# Patient Record
Sex: FEMALE | Race: WHITE | NOT HISPANIC OR LATINO | Employment: OTHER | URBAN - METROPOLITAN AREA
[De-identification: names, ages, dates, MRNs, and addresses within clinical notes are randomized per-mention and may not be internally consistent; named-entity substitution may affect disease eponyms.]

---

## 2017-01-03 ENCOUNTER — TELEPHONE (OUTPATIENT)
Dept: FAMILY MEDICINE | Facility: CLINIC | Age: 62
End: 2017-01-03

## 2017-01-03 DIAGNOSIS — Z00.00 ANNUAL PHYSICAL EXAM: Primary | ICD-10-CM

## 2017-01-03 NOTE — TELEPHONE ENCOUNTER
----- Message from Omayra Zhou sent at 1/3/2017 11:39 AM CST -----  Contact: self  Patient called regarding scheduling lab work before her appt on 2/8/17 for f/u and med refill. No orders. Please contact [phone] ([phone])

## 2017-01-04 NOTE — TELEPHONE ENCOUNTER
Called pt, left message on machine to call back to schedule labs that have been placed upon pts request.

## 2017-01-25 ENCOUNTER — PATIENT OUTREACH (OUTPATIENT)
Dept: ADMINISTRATIVE | Facility: HOSPITAL | Age: 62
End: 2017-01-25

## 2017-01-25 NOTE — PROGRESS NOTES
PRE-VISIT CHART REVIEW    Appointment Scheduled on 2/8/17    Department stratifications & guidelines reviewed:yes    Target Chronic Diagnosis: HTN, obesity    Chronic Diagnosis Intervention Due: no    Goals Updated:No    Health Maintenance Due   Topic Date Due    TETANUS VACCINE  12/29/1972    Zoster Vaccine  12/29/2014    Influenza Vaccine  08/01/2016       Advanced Directives:   65 years of age or older?  No  Directive on file?  N\A                                      Pre-visit patient communication: 01/25/2017 MyChart/Letter    Studies or screenings scheduled pre-visit: no    Educate patient on HTN (127/80), obesity (59.07)

## 2017-01-25 NOTE — LETTER
January 31, 2017    Meka Ann  [address] [address]             [facility]  [address]  [facility] [address]  Phone: [phone] Dear Mrs. Ann:    We have tried to reach you by mychart unsuccessfully.    [facility] is committed to your overall health.  To help you get the most out of each of your visits, we will review your information to make sure you are up to date on all of your recommended tests and/or procedures.       Dr. Corry Katz has found that you may be due for flu, shingles, and tetanus.     If you have had any of the above done at another facility, please bring the records or information with you so that your record at [facility] will be complete.     If you are currently taking medication, please bring it with you to your appointment for review.     If you have any questions or concerns, please don't hesitate to call.    Thank you for letting us care for you,  Mandy Fisher LPN Clinical Care Coordinator  [facility] [place]  [phone]

## 2017-02-03 ENCOUNTER — LAB VISIT (OUTPATIENT)
Dept: LAB | Facility: HOSPITAL | Age: 62
End: 2017-02-03
Attending: FAMILY MEDICINE
Payer: COMMERCIAL

## 2017-02-03 DIAGNOSIS — Z00.00 ANNUAL PHYSICAL EXAM: ICD-10-CM

## 2017-02-03 LAB
ALBUMIN SERPL BCP-MCNC: 3.6 G/DL
ALP SERPL-CCNC: 51 U/L
ALT SERPL W/O P-5'-P-CCNC: 21 U/L
ANION GAP SERPL CALC-SCNC: 5 MMOL/L
AST SERPL-CCNC: 18 U/L
BASOPHILS # BLD AUTO: 0.03 K/UL
BASOPHILS NFR BLD: 0.5 %
BILIRUB SERPL-MCNC: 0.4 MG/DL
BUN SERPL-MCNC: 16 MG/DL
CALCIUM SERPL-MCNC: 9.3 MG/DL
CHLORIDE SERPL-SCNC: 106 MMOL/L
CHOLEST/HDLC SERPL: 3.6 {RATIO}
CO2 SERPL-SCNC: 28 MMOL/L
CREAT SERPL-MCNC: 0.9 MG/DL
DIFFERENTIAL METHOD: ABNORMAL
EOSINOPHIL # BLD AUTO: 0.2 K/UL
EOSINOPHIL NFR BLD: 2.6 %
ERYTHROCYTE [DISTWIDTH] IN BLOOD BY AUTOMATED COUNT: 13.6 %
EST. GFR  (AFRICAN AMERICAN): >60 ML/MIN/1.73 M^2
EST. GFR  (NON AFRICAN AMERICAN): >60 ML/MIN/1.73 M^2
GLUCOSE SERPL-MCNC: 103 MG/DL
HCT VFR BLD AUTO: 40.4 %
HDL/CHOLESTEROL RATIO: 28.1 %
HDLC SERPL-MCNC: 167 MG/DL
HDLC SERPL-MCNC: 47 MG/DL
HGB BLD-MCNC: 13.5 G/DL
LDLC SERPL CALC-MCNC: 102 MG/DL
LYMPHOCYTES # BLD AUTO: 2.5 K/UL
LYMPHOCYTES NFR BLD: 42.2 %
MCH RBC QN AUTO: 31.5 PG
MCHC RBC AUTO-ENTMCNC: 33.4 %
MCV RBC AUTO: 94 FL
MONOCYTES # BLD AUTO: 0.7 K/UL
MONOCYTES NFR BLD: 11.7 %
NEUTROPHILS # BLD AUTO: 2.5 K/UL
NEUTROPHILS NFR BLD: 42.8 %
NONHDLC SERPL-MCNC: 120 MG/DL
PLATELET # BLD AUTO: 284 K/UL
PMV BLD AUTO: 11.4 FL
POTASSIUM SERPL-SCNC: 4.3 MMOL/L
PROT SERPL-MCNC: 6.8 G/DL
RBC # BLD AUTO: 4.29 M/UL
SODIUM SERPL-SCNC: 139 MMOL/L
TRIGL SERPL-MCNC: 90 MG/DL
WBC # BLD AUTO: 5.83 K/UL

## 2017-02-03 PROCEDURE — 83036 HEMOGLOBIN GLYCOSYLATED A1C: CPT

## 2017-02-03 PROCEDURE — 36415 COLL VENOUS BLD VENIPUNCTURE: CPT | Mod: PO

## 2017-02-03 PROCEDURE — 85025 COMPLETE CBC W/AUTO DIFF WBC: CPT

## 2017-02-03 PROCEDURE — 80053 COMPREHEN METABOLIC PANEL: CPT

## 2017-02-03 PROCEDURE — 80061 LIPID PANEL: CPT

## 2017-02-05 ENCOUNTER — PATIENT MESSAGE (OUTPATIENT)
Dept: FAMILY MEDICINE | Facility: CLINIC | Age: 62
End: 2017-02-05

## 2017-02-05 LAB
ESTIMATED AVG GLUCOSE: 117 MG/DL
HBA1C MFR BLD HPLC: 5.7 %

## 2017-02-08 ENCOUNTER — OFFICE VISIT (OUTPATIENT)
Dept: FAMILY MEDICINE | Facility: CLINIC | Age: 62
End: 2017-02-08
Payer: COMMERCIAL

## 2017-02-08 VITALS
HEIGHT: 61 IN | HEART RATE: 70 BPM | DIASTOLIC BLOOD PRESSURE: 90 MMHG | TEMPERATURE: 99 F | SYSTOLIC BLOOD PRESSURE: 128 MMHG | RESPIRATION RATE: 16 BRPM | WEIGHT: 137.81 LBS | BODY MASS INDEX: 26.02 KG/M2

## 2017-02-08 DIAGNOSIS — I10 HYPERTENSION, ESSENTIAL: Primary | ICD-10-CM

## 2017-02-08 PROCEDURE — 3074F SYST BP LT 130 MM HG: CPT | Mod: S$GLB,,, | Performed by: FAMILY MEDICINE

## 2017-02-08 PROCEDURE — 99214 OFFICE O/P EST MOD 30 MIN: CPT | Mod: S$GLB,,, | Performed by: FAMILY MEDICINE

## 2017-02-08 PROCEDURE — 3080F DIAST BP >= 90 MM HG: CPT | Mod: S$GLB,,, | Performed by: FAMILY MEDICINE

## 2017-02-08 RX ORDER — VALSARTAN AND HYDROCHLOROTHIAZIDE 320; 25 MG/1; MG/1
1 TABLET, FILM COATED ORAL DAILY
Qty: 90 TABLET | Refills: 3 | Status: SHIPPED | OUTPATIENT
Start: 2017-02-08 | End: 2018-01-11 | Stop reason: SDUPTHER

## 2017-02-08 RX ORDER — OMEPRAZOLE 20 MG/1
20 CAPSULE, DELAYED RELEASE ORAL DAILY
Qty: 90 CAPSULE | Refills: 1 | Status: SHIPPED | OUTPATIENT
Start: 2017-02-08 | End: 2018-02-08

## 2017-02-08 RX ORDER — NORETHINDRONE ACETATE AND ETHINYL ESTRADIOL 1; 5 MG/1; UG/1
1 TABLET ORAL DAILY
Qty: 90 TABLET | Refills: 1 | Status: SHIPPED | OUTPATIENT
Start: 2017-02-08 | End: 2018-01-11 | Stop reason: SDUPTHER

## 2017-02-08 RX ORDER — VALACYCLOVIR HYDROCHLORIDE 500 MG/1
500 TABLET, FILM COATED ORAL DAILY
Qty: 90 TABLET | Refills: 3 | Status: SHIPPED | OUTPATIENT
Start: 2017-02-08 | End: 2018-02-01 | Stop reason: SDUPTHER

## 2017-02-08 NOTE — MR AVS SNAPSHOT
[facility]  [address]  [facility] [address]  Phone: [phone]  Fax: [phone]                  Meka Ann   2017 8:30 AM   Office Visit    Description:  Female : 1955   Provider:  Corry Katz MD   Department:  [facility]           Reason for Visit     Follow-up           Diagnoses this Visit        Comments    Hypertension, essential    -  Primary            To Do List           Future Appointments        Provider Department Dept Phone    2017 9:00 AM NURSE, [facility] [phone]      Goals (5 Years of Data)     None       These Medications        Disp Refills Start End    valsartan-hydrochlorothiazide (DIOVAN HCT) 320-25 mg per tablet 90 tablet 3 2017    Take 1 tablet by mouth once daily. - Oral    Pharmacy: [facility] - [address] AT [address] [facility] Ph #: [phone]       valacyclovir (VALTREX) 500 MG tablet 90 tablet 3 2017     Take 1 tablet (500 mg total) by mouth once daily. - Oral    Pharmacy: [facility] - [address] AT [address] [facility] Ph #: [phone]       norethindrone-ethinyl estradiol (FEMHRT ) 1-5 mg-mcg Tab 90 tablet 1 2017     Take 1 tablet by mouth once daily. - Oral    Pharmacy: [facility] - [address] AT [address] to [facility] Ph #: [phone]       omeprazole (PRILOSEC) 20 MG capsule 90 capsule 1 2017    Take 1 capsule (20 mg total) by mouth once daily. - Oral    [facility]: [facility] [address] [address] [address] [facility] [facility] [address] AT [facility] [address] Ph #: [phone]         [facility]     [facility] On Call Nurse Care Line -  Assistance  Registered nurses in the [facility] provide clinical advisement, health  "education, appointment booking, and other advisory services.  Call for this free service at [phone].             Medications                Verify that the below list of medications is an accurate representation of the medications you are currently taking.  If none reported, the list may be blank. If incorrect, please contact your healthcare provider. Carry this list with you in case of emergency.           Current Medications     calcium-vitamin D (OSCAL) 250 (625)-125 mg-unit per tablet Take 1 tablet by mouth once daily.    etodolac (LODINE) 500 MG tablet TAKE 1 TABLET BY MOUTH TWICE DAILY AS NEEDED    hydrocodone-acetaminophen (VICODIN) 5-300 mg Tab Take 5-300 mg by mouth 3 (three) times daily as needed.    magnesium oxide-Mg AA chelate (MAGNESIUM, AMINO ACID CHELATE,) 133 mg Tab Take 700 mg by mouth once daily.    multivitamin capsule Take 1 capsule by mouth once daily.    norethindrone-ethinyl estradiol (FEMHRT 1/5) 1-5 mg-mcg Tab Take 1 tablet by mouth once daily.    omeprazole (PRILOSEC) 20 MG capsule Take 1 capsule (20 mg total) by mouth once daily.    valacyclovir (VALTREX) 500 MG tablet Take 1 tablet (500 mg total) by mouth once daily.    valsartan-hydrochlorothiazide (DIOVAN HCT) 320-25 mg per tablet Take 1 tablet by mouth once daily.           Clinical Reference Information           Your Vitals Were     BP Pulse Temp Resp Height Weight    128/90 70 98.5 °F (36.9 °C) (Oral) 16 5' 1" (1.549 m) 62.5 kg (137 lb 12.6 oz)    BMI                26.03 kg/m2          Blood Pressure          Most Recent Value    BP  (!)  128/90      Allergies as of 2/8/2017     Septra [Sulfamethoxazole-trimethoprim]      Immunizations Administered on Date of Encounter - 2/8/2017     None      Language Assistance Services     ATTENTION: Language assistance services are available, free of charge. Please call [phone].      ATENCIÓN: Si habla narcisa, tiene a ramos disposición servicios gratuitos de asistencia lingüística. " Bobbi pelaez [phone].     MACKENZIE Ý: N?u b?n nói Ti?ng Vi?t, có các d?ch v? h? tr? ngôn ng? mi?n phí dành cho b?n. G?i s? [phone].         [facility] complies with applicable Federal civil rights laws and does not discriminate on the basis of race, color, national origin, age, disability, or sex.

## 2017-02-11 NOTE — PROGRESS NOTES
Subjective:       Patient ID: Meka Ann is a 61 y.o. female.    Chief Complaint: Follow-up    HPI   The patient is a 61-year-old who is here today for chronic follow-up.  Today we discussed the followin)  hypertension.  Her blood pressure is elevated today.  Her diastolic blood pressures have been consistently high here in the clinic.  She feels that her blood pressure is high in the clinic because of anxiety and white coat hypertension.  At her last visit, her blood pressure was high and so she came back to have it checked with the nurses and it was normal at the nurse visit.  She is currently taking Diovan /12.5 mg once a day  2)  family history of stomach cancer.  Her father and her paternal uncle both had stomach cancer.  We have discussed genetic testing but she has decided against this.  She does plan to follow up with her GI doctor every 4 years for EGD testing and she is due for her next EGD this summer.  We did talk extensively about this diagnosis and symptoms associated with this diagnosis.  She currently denies any trouble or pain with swallowing, early satiety, acid reflux, nausea, vomiting, diminished appetite or weight loss  3)  history of HSV.  She continues to take her Valtrex regularly.  We did discuss a trial without this medication but previously she had recurrent lesions when she stopped this medication so she is hesitant to do without this medication  4)  HRT.  She is doing well with her femhrt.  She occasionally will space out of her HRT.  We did discuss a trial without this which she will consider but she would like to have a refill on this for now  5)  DDD of the lumbar spine.  She continues to use her Lodine and her Vicodin very sparingly.  She still has Lodine from her last prescription in December and Vicodin from her last prescription in May      Review of Systems   Constitutional: Negative for appetite change, chills, diaphoresis, fatigue, fever and unexpected weight  change.   HENT: Negative for congestion, ear pain, postnasal drip, rhinorrhea, sinus pressure, sneezing, sore throat and trouble swallowing.    Eyes: Negative for pain, discharge and visual disturbance.   Respiratory: Negative for cough, chest tightness, shortness of breath and wheezing.    Cardiovascular: Negative for chest pain, palpitations and leg swelling.   Gastrointestinal: Negative for abdominal distention, abdominal pain, blood in stool, constipation, diarrhea, nausea and vomiting.   Skin: Negative for rash.       Objective:      Physical Exam   Constitutional: She is oriented to person, place, and time. She appears well-developed and well-nourished. No distress.   HENT:   Head: Normocephalic and atraumatic.   Right Ear: Hearing, tympanic membrane, external ear and ear canal normal.   Left Ear: Hearing, tympanic membrane, external ear and ear canal normal.   Nose: Nose normal.   Mouth/Throat: Oropharynx is clear and moist and mucous membranes are normal. No oral lesions. No oropharyngeal exudate, posterior oropharyngeal edema or posterior oropharyngeal erythema.   Eyes: Conjunctivae, EOM and lids are normal. Pupils are equal, round, and reactive to light. No scleral icterus.   Neck: Normal range of motion. Neck supple. Carotid bruit is not present. No thyroid mass and no thyromegaly present.   Cardiovascular: Normal rate, regular rhythm and normal heart sounds.   No extrasystoles are present. PMI is not displaced.  Exam reveals no gallop.    No murmur heard.  Pulmonary/Chest: Effort normal and breath sounds normal. No accessory muscle usage. No respiratory distress.   Clear to auscultation bilaterally.   Abdominal: Soft. Normal appearance and bowel sounds are normal. She exhibits no abdominal bruit. There is no hepatosplenomegaly. There is no tenderness. There is no rebound.   Lymphadenopathy:        Head (right side): No submental and no submandibular adenopathy present.        Head (left side): No  "submental and no submandibular adenopathy present.        Right cervical: No superficial cervical, no deep cervical and no posterior cervical adenopathy present.       Left cervical: No superficial cervical, no deep cervical and no posterior cervical adenopathy present.        Right: No supraclavicular adenopathy present.        Left: No supraclavicular adenopathy present.   Neurological: She is alert and oriented to person, place, and time.   Skin: Skin is warm, dry and intact.   Psychiatric: She has a normal mood and affect.     Blood pressure (!) 128/90, pulse 70, temperature 98.5 °F (36.9 °C), temperature source Oral, resp. rate 16, height 5' 1" (1.549 m), weight 62.5 kg (137 lb 12.6 oz).Body mass index is 26.03 kg/(m^2).        A/P:  1)  hypertension.  Uncertain control.  She will come back in 2 weeks to have the nurses check her blood pressure.  If her blood pressure is higher than 139/89 at that visit, we will add another blood pressure medicine to her Diovan HCT  2)  family history of stomach cancer.  Follow-up with GI for EGD testing later this year.  If she develops any concerning symptoms we discussed above, she will let me know  3)  history of HSV.  Currently asymptomatic.  The did refill her Valtrex.  I did encourage her to consider a trial without this medication  4)  HRT.  I did refill her femhrt.  She will consider trying to wean and stop this medication.  We did discuss the WHO and NAMS recommendations regarding HRT  5)  degenerative disc disease of lumbar spine.  Persistent.  She will continue to use her Lodine and Vicodin sparingly and will let me know when she needs refill of this medication  "

## 2017-02-24 ENCOUNTER — CLINICAL SUPPORT (OUTPATIENT)
Dept: FAMILY MEDICINE | Facility: CLINIC | Age: 62
End: 2017-02-24
Payer: COMMERCIAL

## 2017-02-24 ENCOUNTER — TELEPHONE (OUTPATIENT)
Dept: FAMILY MEDICINE | Facility: CLINIC | Age: 62
End: 2017-02-24

## 2017-02-24 VITALS — SYSTOLIC BLOOD PRESSURE: 124 MMHG | DIASTOLIC BLOOD PRESSURE: 74 MMHG

## 2017-02-24 DIAGNOSIS — I10 HYPERTENSION, ESSENTIAL: Primary | ICD-10-CM

## 2017-02-24 NOTE — PROGRESS NOTES
Pt here for BP check. Checked on left arm and was 124/74. Pt states she took her BP med this morning and thinks her BP was only high on last visit due to her having multiple lean cuisines that weak.

## 2017-06-20 RX ORDER — OMEPRAZOLE 20 MG/1
CAPSULE, DELAYED RELEASE ORAL
Qty: 90 CAPSULE | Refills: 2 | Status: SHIPPED | OUTPATIENT
Start: 2017-06-20 | End: 2017-08-18 | Stop reason: SDUPTHER

## 2017-08-11 ENCOUNTER — PATIENT OUTREACH (OUTPATIENT)
Dept: ADMINISTRATIVE | Facility: HOSPITAL | Age: 62
End: 2017-08-11

## 2017-08-11 NOTE — LETTER
August 15, 2017    Meka Ann  [address] [address]             [facility]  [address]  [facility] [address]  Phone: [phone] Dear Mrs. Ann:    We have tried to reach you by mychart unsuccessfully.    [facility] is committed to your overall health.  To help you get the most out of each of your visits, we will review your information to make sure you are up to date on all of your recommended tests and/or procedures.       Dr. Corry Katz has found that you may be due for osteoporosis screening and possibly tetanus and shingles immunizations.     If you have had any of the above done at another facility, please bring the records or information with you so that your record at [facility] will be complete.  If you would like to schedule any of these, please contact me.     If you are currently taking medication, please bring it with you to your appointment for review.     If you have any questions or concerns, please don't hesitate to call.    Thank you for letting us care for you,  Mandy Fisher LPN Clinical Care Coordinator  [facility] [place]  [phone]

## 2017-08-18 ENCOUNTER — TELEPHONE (OUTPATIENT)
Dept: FAMILY MEDICINE | Facility: CLINIC | Age: 62
End: 2017-08-18

## 2017-08-18 ENCOUNTER — OFFICE VISIT (OUTPATIENT)
Dept: FAMILY MEDICINE | Facility: CLINIC | Age: 62
End: 2017-08-18
Payer: COMMERCIAL

## 2017-08-18 VITALS
HEART RATE: 72 BPM | WEIGHT: 139.13 LBS | RESPIRATION RATE: 18 BRPM | TEMPERATURE: 98 F | HEIGHT: 61 IN | DIASTOLIC BLOOD PRESSURE: 80 MMHG | SYSTOLIC BLOOD PRESSURE: 128 MMHG | BODY MASS INDEX: 26.27 KG/M2

## 2017-08-18 DIAGNOSIS — Z12.31 OTHER SCREENING MAMMOGRAM: ICD-10-CM

## 2017-08-18 DIAGNOSIS — M85.80 OSTEOPENIA, UNSPECIFIED LOCATION: ICD-10-CM

## 2017-08-18 DIAGNOSIS — M79.89 LEG SWELLING: Primary | ICD-10-CM

## 2017-08-18 PROCEDURE — 3074F SYST BP LT 130 MM HG: CPT | Mod: S$GLB,,, | Performed by: FAMILY MEDICINE

## 2017-08-18 PROCEDURE — 99214 OFFICE O/P EST MOD 30 MIN: CPT | Mod: S$GLB,,, | Performed by: FAMILY MEDICINE

## 2017-08-18 PROCEDURE — 3008F BODY MASS INDEX DOCD: CPT | Mod: S$GLB,,, | Performed by: FAMILY MEDICINE

## 2017-08-18 PROCEDURE — 3079F DIAST BP 80-89 MM HG: CPT | Mod: S$GLB,,, | Performed by: FAMILY MEDICINE

## 2017-08-18 RX ORDER — HYDROCODONE BITARTRATE AND ACETAMINOPHEN 5; 300 MG/1; MG/1
1 TABLET ORAL 2 TIMES DAILY PRN
Qty: 30 EACH | Refills: 0 | Status: SHIPPED | OUTPATIENT
Start: 2017-08-18 | End: 2018-08-15 | Stop reason: SDUPTHER

## 2017-08-24 NOTE — PROGRESS NOTES
Subjective:       Patient ID: Meka Ann is a 61 y.o. female.    Chief Complaint: Leg Swelling and Rash    HPI   The patient is a 61-year-old who is here today concerned about leg swelling and a rash she developed recently while on a cruise.  She developed swelling in both of her lower leg accompanied with a red rash while she was on the cruise.  The swelling and rash seemed to develop the day after she had done a lot of walking.  She tried to prop her feet up for the swelling and use lotion for the rash and eventually the swelling and the rash sided.  She's not had problems with this before the cruise or since the cruise.    While she is here, she also wonders if she could do her chronic follow-up.  Overall, she has been doing well and had no issues.  She does request refill of her Vicodin as she only has a few pills left from last year's prescription.  She does occasionally have pain from her neck and back and Vicodin relieves the pain and allows her to function with her daily activities.  She denies any side effects with the Vicodin      Review of Systems   Constitutional: Negative for activity change, appetite change, chills, diaphoresis, fatigue, fever and unexpected weight change.   HENT: Negative for congestion, ear pain, hearing loss, postnasal drip, rhinorrhea, sinus pressure, sneezing, sore throat and trouble swallowing.    Eyes: Negative for pain, discharge and visual disturbance.   Respiratory: Negative for cough, chest tightness, shortness of breath and wheezing.    Cardiovascular: Negative for chest pain, palpitations and leg swelling.   Gastrointestinal: Negative for abdominal distention, abdominal pain, blood in stool, constipation, diarrhea, nausea and vomiting.   Endocrine: Negative for polydipsia and polyuria.   Genitourinary: Negative for difficulty urinating, dysuria, hematuria and menstrual problem.   Musculoskeletal: Positive for back pain and neck pain. Negative for arthralgias and joint  swelling.   Skin: Negative for rash.   Neurological: Negative for weakness and headaches.   Psychiatric/Behavioral: Negative for confusion and dysphoric mood.       Objective:      Physical Exam   Constitutional: She is oriented to person, place, and time. She appears well-developed and well-nourished. No distress.   HENT:   Head: Normocephalic and atraumatic.   Right Ear: Hearing, tympanic membrane, external ear and ear canal normal.   Left Ear: Hearing, tympanic membrane, external ear and ear canal normal.   Nose: Nose normal.   Mouth/Throat: Oropharynx is clear and moist and mucous membranes are normal. No oral lesions. No oropharyngeal exudate, posterior oropharyngeal edema or posterior oropharyngeal erythema.   Eyes: Conjunctivae, EOM and lids are normal. Pupils are equal, round, and reactive to light. No scleral icterus.   Neck: Normal range of motion. Neck supple. Carotid bruit is not present. No thyroid mass and no thyromegaly present.   Cardiovascular: Normal rate, regular rhythm and normal heart sounds.   No extrasystoles are present. PMI is not displaced.  Exam reveals no gallop.    No murmur heard.  Pulmonary/Chest: Effort normal and breath sounds normal. No accessory muscle usage. No respiratory distress.   Clear to auscultation bilaterally.   Abdominal: Soft. Normal appearance and bowel sounds are normal. She exhibits no abdominal bruit. There is no hepatosplenomegaly. There is no tenderness. There is no rebound.   Lymphadenopathy:        Head (right side): No submental and no submandibular adenopathy present.        Head (left side): No submental and no submandibular adenopathy present.        Right cervical: No superficial cervical, no deep cervical and no posterior cervical adenopathy present.       Left cervical: No superficial cervical, no deep cervical and no posterior cervical adenopathy present.        Right: No supraclavicular adenopathy present.        Left: No supraclavicular adenopathy  "present.   Neurological: She is alert and oriented to person, place, and time.   Skin: Skin is warm, dry and intact.   Psychiatric: She has a normal mood and affect.     Blood pressure 128/80, pulse 72, temperature 97.9 °F (36.6 °C), temperature source Oral, resp. rate 18, height 5' 1" (1.549 m), weight 63.1 kg (139 lb 1.8 oz).Body mass index is 26.28 kg/m².        A/P:  1)  bilateral lower extremity edema accompanied with a red rash.  Now resolved.  We will check a venous ultrasound to evaluate this further.  We will also check a CMP and TSH.  If she develops recurrent symptoms, she will let me know.  2)  hypertension.  Well-controlled.  Continue with Diovan  3)  chronic pain due to degenerative disc disease and arthritis.  Well-controlled with rare use of Vicodin which I did refill  4)  history of osteopenia.  Status unknown.  We'll check DEXA scan soon  5)  health maintenance issues.  We will schedule her mammogram.  She understands the WHO and NAMS recommendations regarding HRT and would like to continue with that for now    As long as she does well, I will see her back in 1 year or sooner if needed  "

## 2017-08-29 ENCOUNTER — HOSPITAL ENCOUNTER (OUTPATIENT)
Dept: RADIOLOGY | Facility: HOSPITAL | Age: 62
Discharge: HOME | End: 2017-08-29
Attending: FAMILY MEDICINE
Payer: COMMERCIAL

## 2017-08-29 DIAGNOSIS — M79.89 LEG SWELLING: ICD-10-CM

## 2017-08-29 PROCEDURE — 93970 EXTREMITY STUDY: CPT | Mod: TC,PO

## 2017-08-29 PROCEDURE — 93970 EXTREMITY STUDY: CPT | Mod: 26,,, | Performed by: RADIOLOGY

## 2017-08-31 ENCOUNTER — PATIENT MESSAGE (OUTPATIENT)
Dept: FAMILY MEDICINE | Facility: CLINIC | Age: 62
End: 2017-08-31

## 2017-09-09 ENCOUNTER — PATIENT MESSAGE (OUTPATIENT)
Dept: FAMILY MEDICINE | Facility: CLINIC | Age: 62
End: 2017-09-09

## 2017-09-11 ENCOUNTER — PATIENT MESSAGE (OUTPATIENT)
Dept: FAMILY MEDICINE | Facility: CLINIC | Age: 62
End: 2017-09-11

## 2017-10-18 NOTE — H&P
History & Physical - Short Stay  Gastroenterology      SUBJECTIVE:     Procedure: Gastroscopy    Chief Complaint/Indication for Procedure:  GERD.  FHx of stomach cancer (sister)    History of Present Illness:      PTA Medications   Medication Sig    calcium-vitamin D (OSCAL) 250 (625)-125 mg-unit per tablet Take 1 tablet by mouth once daily.    etodolac (LODINE) 500 MG tablet TAKE 1 TABLET BY MOUTH TWICE DAILY AS NEEDED    hydrocodone-acetaminophen (VICODIN) 5-300 mg Tab Take 1 tablet by mouth 2 (two) times daily as needed.    magnesium oxide-Mg AA chelate (MAGNESIUM, AMINO ACID CHELATE,) 133 mg Tab Take 700 mg by mouth once daily.    multivitamin capsule Take 1 capsule by mouth once daily.    norethindrone-ethinyl estradiol (FEMHRT 1/5) 1-5 mg-mcg Tab Take 1 tablet by mouth once daily.    omeprazole (PRILOSEC) 20 MG capsule Take 1 capsule (20 mg total) by mouth once daily.    valacyclovir (VALTREX) 500 MG tablet Take 1 tablet (500 mg total) by mouth once daily.    valsartan-hydrochlorothiazide (DIOVAN HCT) 320-25 mg per tablet Take 1 tablet by mouth once daily.       Review of patient's allergies indicates:   Allergen Reactions    Septra [sulfamethoxazole-trimethoprim] Hives        Past Medical History:   Diagnosis Date    DDD (degenerative disc disease), lumbar     s/p PT and ANN; last MRI 6/08    GERD (gastroesophageal reflux disease)     H/O esophagogastroduodenoscopy     7/13; next due 7/17    History of esophagitis     History of gastritis     EGD 7/13    History of herpes simplex infection     History of skin cancer     on knee    History of tobacco abuse     Hypertension     Hypertensive retinopathy     Osteopenia     in femur but nl in spine on dexa 7/14, 9/17    Prediabetes     S/P colonoscopy     7/13 next due 7/21     Past Surgical History:   Procedure Laterality Date    COLONOSCOPY      ESOPHAGOGASTRODUODENOSCOPY      TUBAL LIGATION       Family History   Problem Relation Age  "of Onset    Diabetes Mother     Cancer Mother      melanoma    Cancer Father      stomach    Stroke Sister 58    Cancer Sister      stomach cancer    Emphysema Maternal Grandfather     Cancer Maternal Aunt      breast cancer    Breast cancer Maternal Aunt     Cancer Paternal Uncle      stomach     Social History   Substance Use Topics    Smoking status: Former Smoker     Packs/day: 2.00     Years: 35.00     Quit date: 5/16/2007    Smokeless tobacco: Never Used    Alcohol use 3.6 oz/week     6 Glasses of wine per week         OBJECTIVE:     Vital Signs (Most Recent)  Temp: 98.1 °F (36.7 °C) (10/19/17 0756)  Pulse: 64 (10/19/17 0756)  Resp: 18 (10/19/17 0756)  BP: 137/82 (10/19/17 0756)  SpO2: 98 % (10/19/17 0756)    Physical Exam:   :  Ht 5' 1" (1.549 m)    Wt 63.1 kg (139 lb 1.8 oz)    BMI 26.28 kg/m²                       GENERAL:  Comfortable, in no acute distress.                                 HEENT EXAM:  Nonicteric.  No adenopathy.  Oropharynx is clear.               NECK:  Supple.                                                               LUNGS:  Clear.                                                               CARDIAC:  Regular rate and rhythm.  S1, S2.  No murmur.                      ABDOMEN:  Soft, positive bowel sounds, nontender.  No hepatosplenomegaly or masses.  No rebound or guarding.                                             EXTREMITIES:  No edema.     MENTAL STATUS:  Alert and oriented.    ASSESSMENT/PLAN:     Assessment: GERD.  FHx of stomach cancer (sister)    Plan: Gastroscopy    Anesthesia Plan:   MAC / General Anaesthesia    ASA Grade: ASA 2 - Patient with mild systemic disease with no functional limitations    MALLAMPATI SCORE: I (soft palate, uvula, fauces, and tonsillar pillars visible)    "

## 2017-10-19 ENCOUNTER — ANESTHESIA EVENT (OUTPATIENT)
Dept: ENDOSCOPY | Facility: HOSPITAL | Age: 62
End: 2017-10-19
Payer: COMMERCIAL

## 2017-10-19 ENCOUNTER — ANESTHESIA (OUTPATIENT)
Dept: ENDOSCOPY | Facility: HOSPITAL | Age: 62
End: 2017-10-19
Payer: COMMERCIAL

## 2017-10-19 ENCOUNTER — SURGERY (OUTPATIENT)
Age: 62
End: 2017-10-19

## 2017-10-19 ENCOUNTER — HOSPITAL ENCOUNTER (OUTPATIENT)
Facility: HOSPITAL | Age: 62
Discharge: HOME | End: 2017-10-19
Attending: INTERNAL MEDICINE | Admitting: INTERNAL MEDICINE
Payer: COMMERCIAL

## 2017-10-19 VITALS
WEIGHT: 135 LBS | HEIGHT: 61 IN | HEART RATE: 67 BPM | TEMPERATURE: 98 F | OXYGEN SATURATION: 98 % | BODY MASS INDEX: 25.49 KG/M2 | SYSTOLIC BLOOD PRESSURE: 136 MMHG | RESPIRATION RATE: 18 BRPM | DIASTOLIC BLOOD PRESSURE: 75 MMHG

## 2017-10-19 VITALS — RESPIRATION RATE: 15 BRPM

## 2017-10-19 DIAGNOSIS — K21.9 GERD (GASTROESOPHAGEAL REFLUX DISEASE): ICD-10-CM

## 2017-10-19 LAB — H PYLORI INDEX VALUE: NEGATIVE

## 2017-10-19 PROCEDURE — 43239 EGD BIOPSY SINGLE/MULTIPLE: CPT | Mod: PO | Performed by: INTERNAL MEDICINE

## 2017-10-19 PROCEDURE — 87449 NOS EACH ORGANISM AG IA: CPT | Mod: PO | Performed by: INTERNAL MEDICINE

## 2017-10-19 PROCEDURE — 88305 TISSUE EXAM BY PATHOLOGIST: CPT | Performed by: PATHOLOGY

## 2017-10-19 PROCEDURE — D9220A PRA ANESTHESIA: Mod: ANES,,, | Performed by: ANESTHESIOLOGY

## 2017-10-19 PROCEDURE — 88305 TISSUE EXAM BY PATHOLOGIST: CPT | Mod: 26,,, | Performed by: PATHOLOGY

## 2017-10-19 PROCEDURE — 88342 IMHCHEM/IMCYTCHM 1ST ANTB: CPT | Mod: 26,,, | Performed by: PATHOLOGY

## 2017-10-19 PROCEDURE — 27201012 HC FORCEPS, HOT/COLD, DISP: Mod: PO | Performed by: INTERNAL MEDICINE

## 2017-10-19 PROCEDURE — D9220A PRA ANESTHESIA: Mod: CRNA,,, | Performed by: NURSE ANESTHETIST, CERTIFIED REGISTERED

## 2017-10-19 PROCEDURE — 37000009 HC ANESTHESIA EA ADD 15 MINS: Mod: PO | Performed by: INTERNAL MEDICINE

## 2017-10-19 PROCEDURE — 43239 EGD BIOPSY SINGLE/MULTIPLE: CPT | Mod: ,,, | Performed by: INTERNAL MEDICINE

## 2017-10-19 PROCEDURE — 25000003 PHARM REV CODE 250: Mod: PO | Performed by: ANESTHESIOLOGY

## 2017-10-19 PROCEDURE — 25000003 PHARM REV CODE 250: Mod: PO | Performed by: INTERNAL MEDICINE

## 2017-10-19 PROCEDURE — 63600175 PHARM REV CODE 636 W HCPCS: Mod: PO | Performed by: NURSE ANESTHETIST, CERTIFIED REGISTERED

## 2017-10-19 PROCEDURE — 37000008 HC ANESTHESIA 1ST 15 MINUTES: Mod: PO | Performed by: INTERNAL MEDICINE

## 2017-10-19 RX ORDER — PROPOFOL 10 MG/ML
VIAL (ML) INTRAVENOUS
Status: DISCONTINUED | OUTPATIENT
Start: 2017-10-19 | End: 2017-10-19

## 2017-10-19 RX ORDER — SODIUM CHLORIDE, SODIUM LACTATE, POTASSIUM CHLORIDE, CALCIUM CHLORIDE 600; 310; 30; 20 MG/100ML; MG/100ML; MG/100ML; MG/100ML
INJECTION, SOLUTION INTRAVENOUS CONTINUOUS
Status: DISCONTINUED | OUTPATIENT
Start: 2017-10-19 | End: 2017-10-19 | Stop reason: HOSPADM

## 2017-10-19 RX ORDER — OMEPRAZOLE 40 MG/1
40 CAPSULE, DELAYED RELEASE ORAL
Qty: 30 CAPSULE | Refills: 5 | Status: SHIPPED | OUTPATIENT
Start: 2017-10-19 | End: 2018-04-18 | Stop reason: SDUPTHER

## 2017-10-19 RX ORDER — LIDOCAINE HYDROCHLORIDE 10 MG/ML
1 INJECTION, SOLUTION EPIDURAL; INFILTRATION; INTRACAUDAL; PERINEURAL ONCE
Status: COMPLETED | OUTPATIENT
Start: 2017-10-19 | End: 2017-10-19

## 2017-10-19 RX ORDER — LIDOCAINE HCL/PF 100 MG/5ML
SYRINGE (ML) INTRAVENOUS
Status: DISCONTINUED | OUTPATIENT
Start: 2017-10-19 | End: 2017-10-19

## 2017-10-19 RX ADMIN — LIDOCAINE HYDROCHLORIDE: 10 INJECTION, SOLUTION EPIDURAL; INFILTRATION; INTRACAUDAL; PERINEURAL at 08:10

## 2017-10-19 RX ADMIN — SODIUM CHLORIDE, SODIUM LACTATE, POTASSIUM CHLORIDE, AND CALCIUM CHLORIDE: .6; .31; .03; .02 INJECTION, SOLUTION INTRAVENOUS at 08:10

## 2017-10-19 RX ADMIN — PROPOFOL 30 MG: 10 INJECTION, EMULSION INTRAVENOUS at 08:10

## 2017-10-19 RX ADMIN — LIDOCAINE HYDROCHLORIDE 100 MG: 20 INJECTION, SOLUTION INTRAVENOUS at 08:10

## 2017-10-19 NOTE — ANESTHESIA PREPROCEDURE EVALUATION
10/19/2017  Meka Ann is a 61 y.o., female.    Anesthesia Evaluation    I have reviewed the Patient Summary Reports.    I have reviewed the Nursing Notes.   I have reviewed the Medications.     Review of Systems  Anesthesia Hx:  No problems with previous Anesthesia Denies Hx of Anesthetic complications    Social:  Non-Smoker    Cardiovascular:   Hypertension Denies MI.  Denies CAD.    Denies CABG/stent.   Denies Angina.    Pulmonary:   Denies COPD.  Denies Asthma.  Denies Recent URI.    Renal/:   Denies Chronic Renal Disease.     Hepatic/GI:   GERD, poorly controlled Denies Liver Disease.    Musculoskeletal:   Arthritis     Neurological:   Denies TIA. Denies CVA. Denies Seizures.    Endocrine:   Denies Diabetes. Denies Hypothyroidism.    Psych:   Denies Psychiatric History.          Physical Exam  General:  Well nourished    Airway/Jaw/Neck:  Airway Findings: Mouth Opening: Normal Tongue: Normal  General Airway Assessment: Adult, Good  Mallampati: II  Improves to II with phonation.  TM Distance: 4-6 cm      Dental:  Dental Findings: In tact   Chest/Lungs:  Chest/Lungs Findings: Clear to auscultation, Normal Respiratory Rate     Heart/Vascular:  Heart Findings: Rate: Normal  Rhythm: Regular Rhythm  Sounds: Normal  Heart murmur: negative       Mental Status:  Mental Status Findings:  Cooperative, Alert and Oriented         Anesthesia Plan  Type of Anesthesia, risks & benefits discussed:  Anesthesia Type:  general  Patient's Preference:   Intra-op Monitoring Plan:   Intra-op Monitoring Plan Comments:   Post Op Pain Control Plan:   Post Op Pain Control Plan Comments:   Induction:   IV  Beta Blocker:         Informed Consent: Patient understands risks and agrees with Anesthesia plan.  Questions answered. Anesthesia consent signed with patient.  ASA Score: 2     Day of Surgery Review of History & Physical: I  have interviewed and examined the patient. I have reviewed the patient's H&P dated:  There are no significant changes.          Ready For Surgery From Anesthesia Perspective.

## 2017-10-19 NOTE — DISCHARGE INSTRUCTIONS
Recovery After Procedural Sedation (Adult)  You have been given medicine by vein to make you sleep during your surgery. This may have included both a pain medicine and sleeping medicine. Most of the effects have worn off. But you may still have some drowsiness for the next 6 to 8 hours.  Home care  Follow these guidelines when you get home:  · For the next 8 hours, you should be watched by a responsible adult. This person should make sure your condition is not getting worse.  · Don't drink any alcohol for the next 24 hours.  · Don't drive, operate dangerous machinery, or make important business or personal decisions during the next 24 hours.  Note: Your healthcare provider may tell you not to take any medicine by mouth for pain or sleep in the next 4 hours. These medicines may react with the medicines you were given in the hospital. This could cause a much stronger response than usual.  Follow-up care  Follow up with your healthcare provider if you are not alert and back to your usual level of activity within 12 hours.  When to seek medical advice  Call your healthcare provider right away if any of these occur:  · Drowsiness gets worse  · Weakness or dizziness gets worse  · Repeated vomiting  · You can't be awakened   Date Last Reviewed: 10/18/2016  © 5542-9760 [employer]. [address] [place] [address], [place] [address]. All rights reserved. This information is not intended as a substitute for professional medical care. Always follow your healthcare professional's instructions.        Tips to Control Acid Reflux    To control acid reflux, youll need to make some basic diet and lifestyle changes. The simple steps outlined below may be all youll need to ease discomfort.  Watch what you eat  · Avoid fatty foods and spicy foods.  · Eat fewer acidic foods, such as citrus and tomato-based foods. These can increase symptoms.  · Limit drinking alcohol, caffeine, and fizzy beverages. All increase acid  reflux.  · Try limiting chocolate, peppermint, and spearmint. These can worsen acid reflux in some people.  Watch when you eat  · Avoid lying down for 3 hours after eating.  · Do not snack before going to bed.  Raise your head  Raising your head and upper body by 4 to 6 inches helps limit reflux when youre lying down. Put blocks under the head of your bed frame to raise it.  Other changes  · Lose weight, if you need to  · Dont exercise near bedtime  · Avoid tight-fitting clothes  · Limit aspirin and ibuprofen  · Stop smoking   Date Last Reviewed: 7/1/2016  © 5640-9935 [employer]. [address] [place] [address], [place] [address]. All rights reserved. This information is not intended as a substitute for professional medical care. Always follow your healthcare professional's instructions.

## 2017-10-19 NOTE — PLAN OF CARE
Vital signs stable. All questions answered. Denies recent fever or illness. Patient states ready for planned procedure.

## 2017-10-19 NOTE — TRANSFER OF CARE
"Anesthesia Transfer of Care Note    Patient: Meka Ann    Procedure(s) Performed: Procedure(s) (LRB):  ESOPHAGOGASTRODUODENOSCOPY (EGD) (N/A)    Patient location: PACU    Anesthesia Type: general    Transport from OR: Transported from OR on room air with adequate spontaneous ventilation    Post pain: adequate analgesia    Post assessment: no apparent anesthetic complications and tolerated procedure well    Post vital signs: stable    Level of consciousness: awake and alert    Nausea/Vomiting: no nausea/vomiting    Complications: none    Transfer of care protocol was followed      Last vitals:   Visit Vitals  /82 (BP Location: Right arm, Patient Position: Lying)   Pulse 64   Temp 36.7 °C (98.1 °F) (Skin)   Resp 18   Ht 5' 1" (1.549 m)   Wt 61.2 kg (135 lb)   SpO2 98%   Breastfeeding? No   BMI 25.51 kg/m²     "

## 2017-10-19 NOTE — BRIEF OP NOTE
Discharge Note  Short Stay      SUMMARY     Admit Date: 10/19/2017    Attending Physician: Monty Solis Jr., MD     Discharge Physician: Monty Solis Jr., MD    Discharge Date: 10/19/2017 9:05 AM    Final Diagnosis: Gastroesophageal reflux disease, esophagitis presence not specified [K21.9]  Family history of gastric cancer [Z80.0]    Minimal antritis.  Otherwise normal EGD.    Disposition: HOME OR SELF CARE    Patient Instructions:   Current Discharge Medication List      START taking these medications    Details   !! omeprazole (PRILOSEC) 40 MG capsule Take 1 capsule (40 mg total) by mouth before breakfast.  Qty: 30 capsule, Refills: 5       !! - Potential duplicate medications found. Please discuss with provider.      CONTINUE these medications which have NOT CHANGED    Details   calcium-vitamin D (OSCAL) 250 (625)-125 mg-unit per tablet Take 1 tablet by mouth once daily.      etodolac (LODINE) 500 MG tablet TAKE 1 TABLET BY MOUTH TWICE DAILY AS NEEDED  Qty: 60 tablet, Refills: 0      hydrocodone-acetaminophen (VICODIN) 5-300 mg Tab Take 1 tablet by mouth 2 (two) times daily as needed.  Qty: 30 each, Refills: 0      magnesium oxide-Mg AA chelate (MAGNESIUM, AMINO ACID CHELATE,) 133 mg Tab Take 700 mg by mouth once daily.      multivitamin capsule Take 1 capsule by mouth once daily.      norethindrone-ethinyl estradiol (FEMHRT 1/5) 1-5 mg-mcg Tab Take 1 tablet by mouth once daily.  Qty: 90 tablet, Refills: 1      !! omeprazole (PRILOSEC) 20 MG capsule Take 1 capsule (20 mg total) by mouth once daily.  Qty: 90 capsule, Refills: 1      valacyclovir (VALTREX) 500 MG tablet Take 1 tablet (500 mg total) by mouth once daily.  Qty: 90 tablet, Refills: 3      valsartan-hydrochlorothiazide (DIOVAN HCT) 320-25 mg per tablet Take 1 tablet by mouth once daily.  Qty: 90 tablet, Refills: 3       !! - Potential duplicate medications found. Please discuss with provider.          Discharge Procedure Orders (must include  Diet, Follow-up, Activity)    Follow Up:  Follow up with PCP as per your routine.  Please follow an anti reflux diet.  Activity as tolerated.    No driving day of procedure.

## 2017-10-19 NOTE — ANESTHESIA POSTPROCEDURE EVALUATION
"Anesthesia Post Evaluation    Patient: Meka Ann    Procedure(s) Performed: Procedure(s) (LRB):  ESOPHAGOGASTRODUODENOSCOPY (EGD) (N/A)    Final Anesthesia Type: general  Patient location during evaluation: PACU  Patient participation: Yes- Able to Participate  Level of consciousness: awake and alert and oriented  Post-procedure vital signs: reviewed and stable  Pain management: adequate  Airway patency: patent  PONV status at discharge: No PONV  Anesthetic complications: no      Cardiovascular status: blood pressure returned to baseline  Respiratory status: unassisted, spontaneous ventilation and room air  Hydration status: euvolemic  Follow-up not needed.        Visit Vitals  /67 (BP Location: Left arm, Patient Position: Lying)   Pulse 80   Temp 36.5 °C (97.7 °F) (Skin)   Resp 20   Ht 5' 1" (1.549 m)   Wt 61.2 kg (135 lb)   SpO2 96%   Breastfeeding? No   BMI 25.51 kg/m²       Pain/Linda Score: Pain Assessment Performed: Yes (10/19/2017  8:46 AM)  Presence of Pain: non-verbal indicators absent (10/19/2017  8:46 AM)  Linda Score: 7 (10/19/2017  8:46 AM)      "

## 2018-01-11 RX ORDER — VALSARTAN AND HYDROCHLOROTHIAZIDE 320; 25 MG/1; MG/1
TABLET, FILM COATED ORAL
Qty: 90 TABLET | Refills: 1 | Status: SHIPPED | OUTPATIENT
Start: 2018-01-11 | End: 2018-07-03 | Stop reason: SDUPTHER

## 2018-01-11 RX ORDER — NORETHINDRONE ACETATE AND ETHINYL ESTRADIOL .005; 1 MG/1; MG/1
TABLET, FILM COATED ORAL
Qty: 90 TABLET | Refills: 1 | Status: SHIPPED | OUTPATIENT
Start: 2018-01-11 | End: 2018-08-15 | Stop reason: SDUPTHER

## 2018-02-01 RX ORDER — VALACYCLOVIR HYDROCHLORIDE 500 MG/1
TABLET, FILM COATED ORAL
Qty: 90 TABLET | Refills: 3 | Status: SHIPPED | OUTPATIENT
Start: 2018-02-01 | End: 2018-08-15 | Stop reason: SDUPTHER

## 2018-04-18 RX ORDER — OMEPRAZOLE 40 MG/1
CAPSULE, DELAYED RELEASE ORAL
Qty: 90 CAPSULE | Refills: 3 | Status: SHIPPED | OUTPATIENT
Start: 2018-04-18 | End: 2018-08-15 | Stop reason: SDUPTHER

## 2018-06-29 ENCOUNTER — TELEPHONE (OUTPATIENT)
Dept: FAMILY MEDICINE | Facility: CLINIC | Age: 63
End: 2018-06-29

## 2018-06-29 NOTE — TELEPHONE ENCOUNTER
----- Message from Maribel Felder sent at 6/29/2018  8:34 AM CDT -----  Contact: Pt  Name of Who is Calling: Meka      What is the request in detail: Pt is calling requesting to have orders put in the system      Can the clinic reply by [facility]: no      What Number to Call Back if not in [facility]: .[phone] (home)

## 2018-07-02 ENCOUNTER — TELEPHONE (OUTPATIENT)
Dept: FAMILY MEDICINE | Facility: CLINIC | Age: 63
End: 2018-07-02

## 2018-07-02 DIAGNOSIS — R73.03 PREDIABETES: Primary | ICD-10-CM

## 2018-07-03 RX ORDER — VALSARTAN AND HYDROCHLOROTHIAZIDE 320; 25 MG/1; MG/1
TABLET, FILM COATED ORAL
Qty: 90 TABLET | Refills: 0 | Status: SHIPPED | OUTPATIENT
Start: 2018-07-03 | End: 2018-08-15

## 2018-07-20 ENCOUNTER — LAB VISIT (OUTPATIENT)
Dept: LAB | Facility: HOSPITAL | Age: 63
End: 2018-07-20
Attending: FAMILY MEDICINE
Payer: COMMERCIAL

## 2018-07-20 DIAGNOSIS — E11.9 DIABETES MELLITUS WITHOUT COMPLICATION: Primary | ICD-10-CM

## 2018-07-20 DIAGNOSIS — E11.9 DIABETES MELLITUS WITHOUT COMPLICATION: ICD-10-CM

## 2018-07-20 DIAGNOSIS — R73.03 PREDIABETES: ICD-10-CM

## 2018-07-20 LAB
ALBUMIN SERPL BCP-MCNC: 3.8 G/DL
ALP SERPL-CCNC: 49 U/L
ALT SERPL W/O P-5'-P-CCNC: 19 U/L
ANION GAP SERPL CALC-SCNC: 10 MMOL/L
AST SERPL-CCNC: 18 U/L
BASOPHILS # BLD AUTO: 0.07 K/UL
BASOPHILS NFR BLD: 1.3 %
BILIRUB SERPL-MCNC: 0.3 MG/DL
BUN SERPL-MCNC: 17 MG/DL
CALCIUM SERPL-MCNC: 9.4 MG/DL
CHLORIDE SERPL-SCNC: 106 MMOL/L
CHOLEST SERPL-MCNC: 168 MG/DL
CHOLEST/HDLC SERPL: 3.6 {RATIO}
CO2 SERPL-SCNC: 25 MMOL/L
CREAT SERPL-MCNC: 0.8 MG/DL
DIFFERENTIAL METHOD: ABNORMAL
EOSINOPHIL # BLD AUTO: 0.1 K/UL
EOSINOPHIL NFR BLD: 2.6 %
ERYTHROCYTE [DISTWIDTH] IN BLOOD BY AUTOMATED COUNT: 13 %
EST. GFR  (AFRICAN AMERICAN): >60 ML/MIN/1.73 M^2
EST. GFR  (NON AFRICAN AMERICAN): >60 ML/MIN/1.73 M^2
ESTIMATED AVG GLUCOSE: 105 MG/DL
GLUCOSE SERPL-MCNC: 104 MG/DL
HBA1C MFR BLD HPLC: 5.3 %
HCT VFR BLD AUTO: 40.3 %
HDLC SERPL-MCNC: 47 MG/DL
HDLC SERPL: 28 %
HGB BLD-MCNC: 13.2 G/DL
IMM GRANULOCYTES # BLD AUTO: 0.01 K/UL
IMM GRANULOCYTES NFR BLD AUTO: 0.2 %
LDLC SERPL CALC-MCNC: 106 MG/DL
LYMPHOCYTES # BLD AUTO: 2.5 K/UL
LYMPHOCYTES NFR BLD: 45 %
MCH RBC QN AUTO: 31.3 PG
MCHC RBC AUTO-ENTMCNC: 32.8 G/DL
MCV RBC AUTO: 96 FL
MONOCYTES # BLD AUTO: 0.7 K/UL
MONOCYTES NFR BLD: 12 %
NEUTROPHILS # BLD AUTO: 2.1 K/UL
NEUTROPHILS NFR BLD: 38.9 %
NONHDLC SERPL-MCNC: 121 MG/DL
NRBC BLD-RTO: 0 /100 WBC
PLATELET # BLD AUTO: 266 K/UL
PMV BLD AUTO: 11.6 FL
POTASSIUM SERPL-SCNC: 4.4 MMOL/L
PROT SERPL-MCNC: 6.9 G/DL
RBC # BLD AUTO: 4.22 M/UL
SODIUM SERPL-SCNC: 141 MMOL/L
TRIGL SERPL-MCNC: 75 MG/DL
TSH SERPL DL<=0.005 MIU/L-ACNC: 0.73 UIU/ML
WBC # BLD AUTO: 5.49 K/UL

## 2018-07-20 PROCEDURE — 85025 COMPLETE CBC W/AUTO DIFF WBC: CPT

## 2018-07-20 PROCEDURE — 80061 LIPID PANEL: CPT

## 2018-07-20 PROCEDURE — 36415 COLL VENOUS BLD VENIPUNCTURE: CPT | Mod: PO

## 2018-07-20 PROCEDURE — 80053 COMPREHEN METABOLIC PANEL: CPT

## 2018-07-20 PROCEDURE — 84443 ASSAY THYROID STIM HORMONE: CPT

## 2018-07-20 PROCEDURE — 83036 HEMOGLOBIN GLYCOSYLATED A1C: CPT

## 2018-07-23 ENCOUNTER — PATIENT MESSAGE (OUTPATIENT)
Dept: FAMILY MEDICINE | Facility: CLINIC | Age: 63
End: 2018-07-23

## 2018-08-15 ENCOUNTER — OFFICE VISIT (OUTPATIENT)
Dept: FAMILY MEDICINE | Facility: CLINIC | Age: 63
End: 2018-08-15
Payer: COMMERCIAL

## 2018-08-15 ENCOUNTER — TELEPHONE (OUTPATIENT)
Dept: FAMILY MEDICINE | Facility: CLINIC | Age: 63
End: 2018-08-15

## 2018-08-15 VITALS
SYSTOLIC BLOOD PRESSURE: 124 MMHG | OXYGEN SATURATION: 98 % | HEIGHT: 61 IN | WEIGHT: 136 LBS | DIASTOLIC BLOOD PRESSURE: 84 MMHG | HEART RATE: 71 BPM | BODY MASS INDEX: 25.68 KG/M2 | TEMPERATURE: 98 F | RESPIRATION RATE: 16 BRPM

## 2018-08-15 DIAGNOSIS — Z00.00 ANNUAL PHYSICAL EXAM: Primary | ICD-10-CM

## 2018-08-15 PROCEDURE — 99396 PREV VISIT EST AGE 40-64: CPT | Mod: S$GLB,,, | Performed by: FAMILY MEDICINE

## 2018-08-15 PROCEDURE — 3074F SYST BP LT 130 MM HG: CPT | Mod: CPTII,S$GLB,, | Performed by: FAMILY MEDICINE

## 2018-08-15 PROCEDURE — 3079F DIAST BP 80-89 MM HG: CPT | Mod: CPTII,S$GLB,, | Performed by: FAMILY MEDICINE

## 2018-08-15 RX ORDER — LOSARTAN POTASSIUM AND HYDROCHLOROTHIAZIDE 25; 100 MG/1; MG/1
1 TABLET ORAL DAILY
Qty: 90 TABLET | Refills: 1 | Status: SHIPPED | OUTPATIENT
Start: 2018-08-15 | End: 2018-10-18 | Stop reason: SDUPTHER

## 2018-08-15 RX ORDER — NORETHINDRONE ACETATE AND ETHINYL ESTRADIOL 1; 5 MG/1; UG/1
1 TABLET ORAL DAILY
Qty: 90 TABLET | Refills: 0 | Status: SHIPPED | OUTPATIENT
Start: 2018-08-15 | End: 2018-10-18 | Stop reason: SDUPTHER

## 2018-08-15 RX ORDER — VALACYCLOVIR HYDROCHLORIDE 500 MG/1
500 TABLET, FILM COATED ORAL DAILY
Qty: 90 TABLET | Refills: 3 | Status: SHIPPED | OUTPATIENT
Start: 2018-08-15 | End: 2019-10-09 | Stop reason: SDUPTHER

## 2018-08-15 RX ORDER — HYDROCODONE BITARTRATE AND ACETAMINOPHEN 5; 300 MG/1; MG/1
1 TABLET ORAL DAILY PRN
Qty: 30 EACH | Refills: 0 | Status: SHIPPED | OUTPATIENT
Start: 2018-08-15 | End: 2018-08-20 | Stop reason: SDUPTHER

## 2018-08-15 RX ORDER — OMEPRAZOLE 40 MG/1
CAPSULE, DELAYED RELEASE ORAL
Qty: 90 CAPSULE | Refills: 3 | Status: SHIPPED | OUTPATIENT
Start: 2018-08-15 | End: 2019-10-09 | Stop reason: SDUPTHER

## 2018-08-15 RX ORDER — ETODOLAC 500 MG/1
500 TABLET, FILM COATED ORAL 2 TIMES DAILY PRN
Qty: 60 TABLET | Refills: 2 | Status: SHIPPED | OUTPATIENT
Start: 2018-08-15 | End: 2019-03-12 | Stop reason: SDUPTHER

## 2018-08-15 NOTE — TELEPHONE ENCOUNTER
Received fax from [facility]:      A PA is needed for the Hydrocodone/Acetaminophen 5-300. Take 1 tablet by mouth every day as needed..    Insurance phone# [phone]  Pt ID # [id number]    Please advise

## 2018-08-17 NOTE — PROGRESS NOTES
Subjective:       Patient ID: Meka Ann is a 62 y.o. female.    Chief Complaint: Annual Exam    HPI   The patient is a 62-year-old who is here today for her annual visit.  Overall, she is doing well.  Today we discussed the followin) health maintenance issues.  She recently had labs which we reviewed.  She is up-to-date with her Pap smear, mammogram, bone scan and colonoscopy.  She is due for immunizations but declined those today.  She is doing well with her HRT and she would like to continue with this  2) hypertension.  She is currently taking Diovan HCT and knows there is a recall on this medicine.   3) COLE with family history of stomach cancer.  She does need a refill on her Prilosec.  We did discuss the risks of long-term PPI use.  Given her family history, she would like to continue with Prilosec despite the risk associated with this medicine  4) chronic pain. She is bothered episodically by arthritis pain in her neck and low back.  Currently, she is having a flare in her neck pain.  She would like to have a refill on her Vicodin and her Lodine.  Once a year, she gets a refill on her Vicodin and 30 tablets usually last her 1 year.  She uses the lodine a little bit more frequently than she uses the Vicodin but does not use this for more than a week at a time  5) HSV.  She does request a refill of her Valtrex.  She never misses this pill and would like to continue with it to prevent recurrent outbreaks       Review of Systems   Constitutional: Negative for activity change, appetite change, chills, diaphoresis, fatigue, fever and unexpected weight change.   HENT: Negative for congestion, dental problem, ear pain, hearing loss, postnasal drip, rhinorrhea, sneezing, sore throat and trouble swallowing.    Eyes: Negative for photophobia, pain, discharge and visual disturbance.   Respiratory: Negative for cough, chest tightness, shortness of breath and wheezing.    Cardiovascular: Negative for chest pain,  palpitations and leg swelling.   Gastrointestinal: Negative for abdominal distention, abdominal pain, blood in stool, constipation, diarrhea, nausea and vomiting.   Endocrine: Negative for cold intolerance, heat intolerance, polydipsia and polyuria.   Genitourinary: Negative for difficulty urinating, dysuria, flank pain, frequency, genital sores, hematuria, menstrual problem and vaginal discharge.   Musculoskeletal: Positive for back pain and neck pain. Negative for arthralgias, joint swelling and myalgias.   Skin: Negative for rash.   Neurological: Negative for dizziness, syncope, weakness, light-headedness and headaches.   Hematological: Negative for adenopathy. Does not bruise/bleed easily.   Psychiatric/Behavioral: Negative for confusion, dysphoric mood, self-injury, sleep disturbance and suicidal ideas. The patient is not nervous/anxious.        Objective:      Physical Exam   Constitutional: She is oriented to person, place, and time. She appears well-developed and well-nourished. No distress.   HENT:   Head: Normocephalic and atraumatic.   Right Ear: Hearing, tympanic membrane, external ear and ear canal normal.   Left Ear: Hearing, tympanic membrane, external ear and ear canal normal.   Nose: Nose normal.   Mouth/Throat: Oropharynx is clear and moist and mucous membranes are normal. No oral lesions. No oropharyngeal exudate, posterior oropharyngeal edema or posterior oropharyngeal erythema.   Eyes: Conjunctivae, EOM and lids are normal. Pupils are equal, round, and reactive to light. No scleral icterus.   Neck: Normal range of motion. Neck supple. Carotid bruit is not present. No thyroid mass and no thyromegaly present.   Cardiovascular: Normal rate, regular rhythm and normal heart sounds.  No extrasystoles are present. PMI is not displaced. Exam reveals no gallop.   No murmur heard.  Pulmonary/Chest: Effort normal and breath sounds normal. No accessory muscle usage. No respiratory distress.   Clear to  "auscultation bilaterally.   Abdominal: Soft. Normal appearance and bowel sounds are normal. She exhibits no abdominal bruit. There is no hepatosplenomegaly. There is no tenderness. There is no rebound.   Lymphadenopathy:        Head (right side): No submental and no submandibular adenopathy present.        Head (left side): No submental and no submandibular adenopathy present.        Right cervical: No superficial cervical, no deep cervical and no posterior cervical adenopathy present.       Left cervical: No superficial cervical, no deep cervical and no posterior cervical adenopathy present.        Right: No supraclavicular adenopathy present.        Left: No supraclavicular adenopathy present.   Neurological: She is alert and oriented to person, place, and time. She has normal strength. No cranial nerve deficit or sensory deficit.   Skin: Skin is warm, dry and intact.   Psychiatric: She has a normal mood and affect. Her speech is normal and behavior is normal. Thought content normal. Cognition and memory are normal.     Blood pressure 124/84, pulse 71, temperature 98.1 °F (36.7 °C), resp. rate 16, height 5' 1" (1.549 m), weight 61.7 kg (136 lb), SpO2 98 %.Body mass index is 25.7 kg/m².          A/P:  1) annual exam.  Health maintenance issues and anticipatory guidance issues were discussed.  If she changes her mind regarding her immunizations, she will let me know.  We will schedule her mammogram next month.  For now we will continue with HRT and she is aware of the WHO and NAMS recommendations regarding HRT  2) hypertension.  Well controlled.  Given the recall the Diovan HCT, we will stop this and move to Hyzaar 100/25 mg once a day.  She will return in 2 weeks to have the nurses check her blood pressure to make sure she is doing  well with this dose of medicine   3) GERD with strong family history of stomach cancer.  Asymptomatic.  Continue with Prilosec.  Follow up for repeat EGD as planned in October 2020  4) " chronic pain. I did refill her Vicodin and her lodeine.  If she wishes to consider physical therapy when she has flares, she will let me know  5) HSV.  Asymptomatic.  Continue with Valtrex which was refilled

## 2018-08-20 ENCOUNTER — TELEPHONE (OUTPATIENT)
Dept: FAMILY MEDICINE | Facility: CLINIC | Age: 63
End: 2018-08-20

## 2018-08-20 RX ORDER — HYDROCODONE BITARTRATE AND ACETAMINOPHEN 5; 300 MG/1; MG/1
1 TABLET ORAL 4 TIMES DAILY PRN
Qty: 28 EACH | Refills: 0 | Status: SHIPPED | OUTPATIENT
Start: 2018-08-20 | End: 2019-10-09 | Stop reason: SDUPTHER

## 2018-08-20 NOTE — TELEPHONE ENCOUNTER
Pls notify pt:    Our PA for vicodin was denied - as a result of new restrictions regarding narcotics.  Ins only cover 7 days for pain meds so I'll rewrite the norco for 7 days to give us 28 pills which will be close to your yearly supply

## 2018-09-02 ENCOUNTER — PATIENT MESSAGE (OUTPATIENT)
Dept: ADMINISTRATIVE | Facility: OTHER | Age: 63
End: 2018-09-02

## 2018-09-05 ENCOUNTER — CLINICAL SUPPORT (OUTPATIENT)
Dept: FAMILY MEDICINE | Facility: CLINIC | Age: 63
End: 2018-09-05
Payer: COMMERCIAL

## 2018-09-05 VITALS — HEART RATE: 79 BPM | SYSTOLIC BLOOD PRESSURE: 134 MMHG | OXYGEN SATURATION: 97 % | DIASTOLIC BLOOD PRESSURE: 86 MMHG

## 2018-09-05 NOTE — PROGRESS NOTES
Pt here for BP check.   Meka Ann 62 y.o. female is here today for Blood Pressure check.   History of HTN yes.    Review of patient's allergies indicates:   Allergen Reactions    Septra [sulfamethoxazole-trimethoprim] Hives     Creatinine   Date Value Ref Range Status   07/20/2018 0.8 0.5 - 1.4 mg/dL Final     Sodium   Date Value Ref Range Status   07/20/2018 141 136 - 145 mmol/L Final     Potassium   Date Value Ref Range Status   07/20/2018 4.4 3.5 - 5.1 mmol/L Final   ]  Patient verifies taking blood pressure medications on a regular basis at the same time of the day.     Current Outpatient Medications:     calcium-vitamin D (OSCAL) 250 (625)-125 mg-unit per tablet, Take 1 tablet by mouth once daily., Disp: , Rfl:     etodolac (LODINE) 500 MG tablet, Take 1 tablet (500 mg total) by mouth 2 (two) times daily as needed., Disp: 60 tablet, Rfl: 2    HYDROcodone-acetaminophen (VICODIN) 5-300 mg Tab, Take 1 tablet by mouth 4 (four) times daily as needed., Disp: 28 each, Rfl: 0    losartan-hydrochlorothiazide 100-25 mg (HYZAAR) 100-25 mg per tablet, Take 1 tablet by mouth once daily., Disp: 90 tablet, Rfl: 1    magnesium oxide-Mg AA chelate (MAGNESIUM, AMINO ACID CHELATE,) 133 mg Tab, Take 700 mg by mouth once daily., Disp: , Rfl:     multivitamin capsule, Take 1 capsule by mouth once daily., Disp: , Rfl:     norethindrone-ethinyl estradiol (FYAVOLV) 1-5 mg-mcg Tab, Take 1 tablet by mouth once daily., Disp: 90 tablet, Rfl: 0    omeprazole (PRILOSEC) 40 MG capsule, TAKE 1 CAPSULE(40 MG) BY MOUTH BEFORE BREAKFAST, Disp: 90 capsule, Rfl: 3    valACYclovir (VALTREX) 500 MG tablet, Take 1 tablet (500 mg total) by mouth once daily., Disp: 90 tablet, Rfl: 3  Does patient have record of home blood pressure readings no. .   Last dose of blood pressure medication was taken at 7:30 am today   Patient is asymptomatic. .      ,   .    Blood pressure reading after 15 minutes was 132 86  , Pulse 79.  Repeat BP after 5  min 134/ 86  Dr. Katz  notified.

## 2018-09-10 ENCOUNTER — TELEPHONE (OUTPATIENT)
Dept: FAMILY MEDICINE | Facility: CLINIC | Age: 63
End: 2018-09-10

## 2018-09-10 NOTE — TELEPHONE ENCOUNTER
----- Message from Bettina Prince sent at 9/10/2018  1:00 PM CDT -----  Contact: [phone]  Patient is requesting a call back from the nurse concerning blood pressure medication.  Please call the patient upon request at phone number [phone].

## 2018-10-03 ENCOUNTER — TELEPHONE (OUTPATIENT)
Dept: FAMILY MEDICINE | Facility: CLINIC | Age: 63
End: 2018-10-03

## 2018-10-03 NOTE — TELEPHONE ENCOUNTER
----- Message from Shena Aguilar sent at 10/3/2018 10:34 AM CDT -----  Contact: Patient  Patient needs to schedule a nurse visit to have her blood pressure checked.  Call Back#[phone]  Thanks

## 2018-10-16 ENCOUNTER — PATIENT MESSAGE (OUTPATIENT)
Dept: FAMILY MEDICINE | Facility: CLINIC | Age: 63
End: 2018-10-16

## 2018-10-18 ENCOUNTER — CLINICAL SUPPORT (OUTPATIENT)
Dept: FAMILY MEDICINE | Facility: CLINIC | Age: 63
End: 2018-10-18
Payer: COMMERCIAL

## 2018-10-18 VITALS — SYSTOLIC BLOOD PRESSURE: 116 MMHG | DIASTOLIC BLOOD PRESSURE: 84 MMHG | HEART RATE: 78 BPM

## 2018-10-18 NOTE — PROGRESS NOTES
Pt here for BP check.    Meka Ann 62 y.o. female is here today for Blood Pressure check.   History of HTN yes.    Review of patient's allergies indicates:   Allergen Reactions    Septra [sulfamethoxazole-trimethoprim] Hives     Creatinine   Date Value Ref Range Status   07/20/2018 0.8 0.5 - 1.4 mg/dL Final     Sodium   Date Value Ref Range Status   07/20/2018 141 136 - 145 mmol/L Final     Potassium   Date Value Ref Range Status   07/20/2018 4.4 3.5 - 5.1 mmol/L Final   ]  Patient verifies taking blood pressure medications on a regular basis at the same time of the day.     Current Outpatient Medications:     calcium-vitamin D (OSCAL) 250 (625)-125 mg-unit per tablet, Take 1 tablet by mouth once daily., Disp: , Rfl:     etodolac (LODINE) 500 MG tablet, Take 1 tablet (500 mg total) by mouth 2 (two) times daily as needed., Disp: 60 tablet, Rfl: 2    HYDROcodone-acetaminophen (VICODIN) 5-300 mg Tab, Take 1 tablet by mouth 4 (four) times daily as needed., Disp: 28 each, Rfl: 0    losartan-hydrochlorothiazide 100-25 mg (HYZAAR) 100-25 mg per tablet, Take 1 tablet by mouth once daily., Disp: 90 tablet, Rfl: 1    magnesium oxide-Mg AA chelate (MAGNESIUM, AMINO ACID CHELATE,) 133 mg Tab, Take 700 mg by mouth once daily., Disp: , Rfl:     multivitamin capsule, Take 1 capsule by mouth once daily., Disp: , Rfl:     norethindrone-ethinyl estradiol (FYAVOLV) 1-5 mg-mcg Tab, Take 1 tablet by mouth once daily., Disp: 90 tablet, Rfl: 0    omeprazole (PRILOSEC) 40 MG capsule, TAKE 1 CAPSULE(40 MG) BY MOUTH BEFORE BREAKFAST, Disp: 90 capsule, Rfl: 3    valACYclovir (VALTREX) 500 MG tablet, Take 1 tablet (500 mg total) by mouth once daily., Disp: 90 tablet, Rfl: 3  Does patient have record of home blood pressure readings no.  Last dose of blood pressure medication was taken at 7:30 am today   Patient is asymptomatic. .      ,   .    Blood pressure reading after 15 minutes was 116/84 , Pulse 78    Dr. Sterling bernardo

## 2018-10-20 RX ORDER — NORETHINDRONE ACETATE AND ETHINYL ESTRADIOL 1; 5 MG/1; UG/1
1 TABLET ORAL DAILY
Qty: 90 TABLET | Refills: 3 | Status: SHIPPED | OUTPATIENT
Start: 2018-10-20 | End: 2019-10-09

## 2018-10-20 RX ORDER — LOSARTAN POTASSIUM AND HYDROCHLOROTHIAZIDE 25; 100 MG/1; MG/1
1 TABLET ORAL DAILY
Qty: 90 TABLET | Refills: 1 | Status: SHIPPED | OUTPATIENT
Start: 2018-10-20 | End: 2019-04-19 | Stop reason: SDUPTHER

## 2019-01-10 RX ORDER — VALSARTAN AND HYDROCHLOROTHIAZIDE 320; 25 MG/1; MG/1
TABLET, FILM COATED ORAL
Qty: 90 TABLET | Refills: 0 | OUTPATIENT
Start: 2019-01-10

## 2019-01-10 NOTE — PROGRESS NOTES
Refill Authorization Note     is requesting a refill authorization.    Brief assessment and rationale for refill: DENY: alternate therapy given due to recall  Amount/Quantity of medication ordered: 90d        Refills Authorized: No              Medication Therapy Plan: Previously switched to losartan-hctz 2/2 to recall of valsartan, pt was given 6 month supply 10/20/2019; DENY  Name and strength of medication: valsartan-hydrochlorothiazide (DIOVAN-HCT) 320-25 mg per tablet  How patient will take medication: t1t po qd  Medication reconciliation completed: No        Comments:

## 2019-03-14 RX ORDER — ETODOLAC 500 MG/1
TABLET, FILM COATED ORAL
Qty: 60 TABLET | Refills: 0 | Status: SHIPPED | OUTPATIENT
Start: 2019-03-14 | End: 2019-10-09 | Stop reason: SDUPTHER

## 2019-03-14 NOTE — PROGRESS NOTES
Refill Authorization Note     is requesting a refill authorization.    Brief assessment and rationale for refill: ROUTE: op  Name and strength of medication: etodolac (LODINE) 500 MG tablet            Medication reconciliation completed: No                         Comments:   APPOINTMENTS (past 12 m or future 3m authorizing provider)  LAST VISIT DATE  Corry Katz MD 8/15/2018         NEXT VISIT DATE  Corry Katz MD Visit date not found

## 2019-04-20 RX ORDER — LOSARTAN POTASSIUM AND HYDROCHLOROTHIAZIDE 25; 100 MG/1; MG/1
TABLET ORAL
Qty: 90 TABLET | Refills: 0 | Status: SHIPPED | OUTPATIENT
Start: 2019-04-20 | End: 2019-10-09 | Stop reason: SDUPTHER

## 2019-08-28 ENCOUNTER — TELEPHONE (OUTPATIENT)
Dept: FAMILY MEDICINE | Facility: CLINIC | Age: 64
End: 2019-08-28

## 2019-08-28 NOTE — TELEPHONE ENCOUNTER
----- Message from Rosalinda Ozuna sent at 8/28/2019 10:35 AM CDT -----  Contact: patient  Type:  Sooner Apoointment Request    Caller is requesting a sooner appointment.  Caller declined first available appointment listed below.  Caller will not accept being placed on the waitlist and is requesting a message be sent to doctor.    Name of Caller:  patient  When is the first available appointment?  10/24  Symptoms:  annual  Best Call Back Number:  [phone]  Additional Information:  Requesting  Call back to confirm appointment

## 2019-08-29 ENCOUNTER — PATIENT OUTREACH (OUTPATIENT)
Dept: ADMINISTRATIVE | Facility: HOSPITAL | Age: 64
End: 2019-08-29

## 2019-10-09 ENCOUNTER — TELEPHONE (OUTPATIENT)
Dept: FAMILY MEDICINE | Facility: CLINIC | Age: 64
End: 2019-10-09

## 2019-10-09 ENCOUNTER — OFFICE VISIT (OUTPATIENT)
Dept: FAMILY MEDICINE | Facility: CLINIC | Age: 64
End: 2019-10-09
Payer: COMMERCIAL

## 2019-10-09 VITALS
TEMPERATURE: 99 F | SYSTOLIC BLOOD PRESSURE: 128 MMHG | OXYGEN SATURATION: 98 % | BODY MASS INDEX: 26.28 KG/M2 | RESPIRATION RATE: 18 BRPM | HEART RATE: 78 BPM | DIASTOLIC BLOOD PRESSURE: 76 MMHG | HEIGHT: 61 IN | WEIGHT: 139.19 LBS

## 2019-10-09 DIAGNOSIS — I10 HYPERTENSION, ESSENTIAL: ICD-10-CM

## 2019-10-09 DIAGNOSIS — Z00.00 ANNUAL PHYSICAL EXAM: Primary | ICD-10-CM

## 2019-10-09 DIAGNOSIS — Z12.39 BREAST CANCER SCREENING: ICD-10-CM

## 2019-10-09 LAB
BASOPHILS # BLD AUTO: 0.05 K/UL (ref 0–0.2)
BASOPHILS NFR BLD: 0.7 % (ref 0–1.9)
DIFFERENTIAL METHOD: ABNORMAL
EOSINOPHIL # BLD AUTO: 0.1 K/UL (ref 0–0.5)
EOSINOPHIL NFR BLD: 1.7 % (ref 0–8)
ERYTHROCYTE [DISTWIDTH] IN BLOOD BY AUTOMATED COUNT: 13.9 % (ref 11.5–14.5)
ESTIMATED AVG GLUCOSE: 103 MG/DL (ref 68–131)
HBA1C MFR BLD HPLC: 5.2 % (ref 4–5.6)
HCT VFR BLD AUTO: 45.1 % (ref 37–48.5)
HGB BLD-MCNC: 14.1 G/DL (ref 12–16)
IMM GRANULOCYTES # BLD AUTO: 0.02 K/UL (ref 0–0.04)
IMM GRANULOCYTES NFR BLD AUTO: 0.3 % (ref 0–0.5)
LYMPHOCYTES # BLD AUTO: 2.6 K/UL (ref 1–4.8)
LYMPHOCYTES NFR BLD: 36.8 % (ref 18–48)
MCH RBC QN AUTO: 30.5 PG (ref 27–31)
MCHC RBC AUTO-ENTMCNC: 31.3 G/DL (ref 32–36)
MCV RBC AUTO: 98 FL (ref 82–98)
MONOCYTES # BLD AUTO: 0.6 K/UL (ref 0.3–1)
MONOCYTES NFR BLD: 8.8 % (ref 4–15)
NEUTROPHILS # BLD AUTO: 3.6 K/UL (ref 1.8–7.7)
NEUTROPHILS NFR BLD: 51.7 % (ref 38–73)
NRBC BLD-RTO: 0 /100 WBC
PLATELET # BLD AUTO: 279 K/UL (ref 150–350)
PMV BLD AUTO: 12.2 FL (ref 9.2–12.9)
RBC # BLD AUTO: 4.62 M/UL (ref 4–5.4)
WBC # BLD AUTO: 7.03 K/UL (ref 3.9–12.7)

## 2019-10-09 PROCEDURE — 87624 HPV HI-RISK TYP POOLED RSLT: CPT

## 2019-10-09 PROCEDURE — 99396 PREV VISIT EST AGE 40-64: CPT | Mod: S$GLB,,, | Performed by: FAMILY MEDICINE

## 2019-10-09 PROCEDURE — 84443 ASSAY THYROID STIM HORMONE: CPT

## 2019-10-09 PROCEDURE — 3074F PR MOST RECENT SYSTOLIC BLOOD PRESSURE < 130 MM HG: ICD-10-PCS | Mod: CPTII,S$GLB,, | Performed by: FAMILY MEDICINE

## 2019-10-09 PROCEDURE — 88175 CYTOPATH C/V AUTO FLUID REDO: CPT

## 2019-10-09 PROCEDURE — 3078F PR MOST RECENT DIASTOLIC BLOOD PRESSURE < 80 MM HG: ICD-10-PCS | Mod: CPTII,S$GLB,, | Performed by: FAMILY MEDICINE

## 2019-10-09 PROCEDURE — 83036 HEMOGLOBIN GLYCOSYLATED A1C: CPT

## 2019-10-09 PROCEDURE — 3074F SYST BP LT 130 MM HG: CPT | Mod: CPTII,S$GLB,, | Performed by: FAMILY MEDICINE

## 2019-10-09 PROCEDURE — 36415 COLL VENOUS BLD VENIPUNCTURE: CPT | Mod: S$GLB,,, | Performed by: FAMILY MEDICINE

## 2019-10-09 PROCEDURE — 3078F DIAST BP <80 MM HG: CPT | Mod: CPTII,S$GLB,, | Performed by: FAMILY MEDICINE

## 2019-10-09 PROCEDURE — 85025 COMPLETE CBC W/AUTO DIFF WBC: CPT

## 2019-10-09 PROCEDURE — 80053 COMPREHEN METABOLIC PANEL: CPT

## 2019-10-09 PROCEDURE — 80061 LIPID PANEL: CPT

## 2019-10-09 PROCEDURE — 36415 PR COLLECTION VENOUS BLOOD,VENIPUNCTURE: ICD-10-PCS | Mod: S$GLB,,, | Performed by: FAMILY MEDICINE

## 2019-10-09 PROCEDURE — 99396 PR PREVENTIVE VISIT,EST,40-64: ICD-10-PCS | Mod: S$GLB,,, | Performed by: FAMILY MEDICINE

## 2019-10-09 RX ORDER — HYDROCODONE BITARTRATE AND ACETAMINOPHEN 5; 300 MG/1; MG/1
1 TABLET ORAL 4 TIMES DAILY PRN
Qty: 28 EACH | Refills: 0 | Status: SHIPPED | OUTPATIENT
Start: 2019-10-09 | End: 2020-10-16 | Stop reason: SDUPTHER

## 2019-10-09 RX ORDER — HYDROCODONE BITARTRATE AND ACETAMINOPHEN 5; 300 MG/1; MG/1
1 TABLET ORAL 4 TIMES DAILY PRN
Qty: 28 EACH | Refills: 0 | Status: CANCELLED | OUTPATIENT
Start: 2019-10-09

## 2019-10-09 RX ORDER — VALACYCLOVIR HYDROCHLORIDE 500 MG/1
500 TABLET, FILM COATED ORAL DAILY
Qty: 90 TABLET | Refills: 3 | Status: SHIPPED | OUTPATIENT
Start: 2019-10-09 | End: 2020-10-16 | Stop reason: SDUPTHER

## 2019-10-09 RX ORDER — NORETHINDRONE ACETATE AND ETHINYL ESTRADIOL 1; 5 MG/1; UG/1
TABLET ORAL
COMMUNITY
End: 2019-10-09 | Stop reason: SDUPTHER

## 2019-10-09 RX ORDER — LOSARTAN POTASSIUM AND HYDROCHLOROTHIAZIDE 25; 100 MG/1; MG/1
1 TABLET ORAL DAILY
Qty: 90 TABLET | Refills: 1 | Status: SHIPPED | OUTPATIENT
Start: 2019-10-09 | End: 2020-01-06

## 2019-10-09 RX ORDER — NORETHINDRONE ACETATE AND ETHINYL ESTRADIOL 1; 5 MG/1; UG/1
1 TABLET ORAL DAILY
Qty: 90 TABLET | Refills: 0 | Status: SHIPPED | OUTPATIENT
Start: 2019-10-09 | End: 2020-05-12

## 2019-10-09 RX ORDER — OMEPRAZOLE 40 MG/1
CAPSULE, DELAYED RELEASE ORAL
Qty: 90 CAPSULE | Refills: 3 | Status: SHIPPED | OUTPATIENT
Start: 2019-10-09 | End: 2020-10-16 | Stop reason: SDUPTHER

## 2019-10-09 RX ORDER — ETODOLAC 500 MG/1
TABLET, FILM COATED ORAL
Qty: 60 TABLET | Refills: 0 | Status: SHIPPED | OUTPATIENT
Start: 2019-10-09 | End: 2019-10-10 | Stop reason: SDUPTHER

## 2019-10-09 NOTE — TELEPHONE ENCOUNTER
Received fax from pharmacy stating that generic losartan/hct tab 100-25 is on backorder.  Please advise.

## 2019-10-10 LAB
ALBUMIN SERPL BCP-MCNC: 4.2 G/DL (ref 3.5–5.2)
ALP SERPL-CCNC: 52 U/L (ref 55–135)
ALT SERPL W/O P-5'-P-CCNC: 24 U/L (ref 10–44)
ANION GAP SERPL CALC-SCNC: 11 MMOL/L (ref 8–16)
AST SERPL-CCNC: 20 U/L (ref 10–40)
BILIRUB SERPL-MCNC: 0.5 MG/DL (ref 0.1–1)
BUN SERPL-MCNC: 13 MG/DL (ref 8–23)
CALCIUM SERPL-MCNC: 9.8 MG/DL (ref 8.7–10.5)
CHLORIDE SERPL-SCNC: 105 MMOL/L (ref 95–110)
CHOLEST SERPL-MCNC: 190 MG/DL (ref 120–199)
CHOLEST/HDLC SERPL: 3.4 {RATIO} (ref 2–5)
CO2 SERPL-SCNC: 23 MMOL/L (ref 23–29)
CREAT SERPL-MCNC: 0.8 MG/DL (ref 0.5–1.4)
EST. GFR  (AFRICAN AMERICAN): >60 ML/MIN/1.73 M^2
EST. GFR  (NON AFRICAN AMERICAN): >60 ML/MIN/1.73 M^2
GLUCOSE SERPL-MCNC: 98 MG/DL (ref 70–110)
HDLC SERPL-MCNC: 56 MG/DL (ref 40–75)
HDLC SERPL: 29.5 % (ref 20–50)
LDLC SERPL CALC-MCNC: 109.6 MG/DL (ref 63–159)
NONHDLC SERPL-MCNC: 134 MG/DL
POTASSIUM SERPL-SCNC: 4.1 MMOL/L (ref 3.5–5.1)
PROT SERPL-MCNC: 7.3 G/DL (ref 6–8.4)
SODIUM SERPL-SCNC: 139 MMOL/L (ref 136–145)
TRIGL SERPL-MCNC: 122 MG/DL (ref 30–150)
TSH SERPL DL<=0.005 MIU/L-ACNC: 0.67 UIU/ML (ref 0.4–4)

## 2019-10-10 RX ORDER — LOSARTAN POTASSIUM 100 MG/1
100 TABLET ORAL DAILY
Qty: 90 TABLET | Refills: 1 | Status: SHIPPED | OUTPATIENT
Start: 2019-10-10 | End: 2020-01-21 | Stop reason: SDUPTHER

## 2019-10-10 RX ORDER — ETODOLAC 500 MG/1
TABLET, FILM COATED ORAL
Qty: 180 TABLET | Refills: 0 | Status: SHIPPED | OUTPATIENT
Start: 2019-10-10 | End: 2020-10-16 | Stop reason: SDUPTHER

## 2019-10-10 RX ORDER — HYDROCHLOROTHIAZIDE 25 MG/1
25 TABLET ORAL DAILY
Qty: 90 TABLET | Refills: 1 | Status: SHIPPED | OUTPATIENT
Start: 2019-10-10 | End: 2020-01-21 | Stop reason: SDUPTHER

## 2019-10-10 NOTE — TELEPHONE ENCOUNTER
Per Lynette with [facility], one bottle of 30 tablets is available and they are unable to order more at this time.

## 2019-10-11 ENCOUNTER — PATIENT MESSAGE (OUTPATIENT)
Dept: FAMILY MEDICINE | Facility: CLINIC | Age: 64
End: 2019-10-11

## 2019-10-11 LAB
HPV HR 12 DNA CVX QL NAA+PROBE: NEGATIVE
HPV16 AG SPEC QL: NEGATIVE
HPV18 DNA SPEC QL NAA+PROBE: NEGATIVE

## 2019-10-17 NOTE — PROGRESS NOTES
Subjective:       Patient ID: Meka Ann is a 63 y.o. female.    Chief Complaint: Annual Exam    HPI   The patient is a 63-year-old who is here today for her annual exam.  Overall, she is doing well.  She is doing cardio and weights 3 to 4 times a week.  She is trying to eat healthy diet.  She does continue with HRT which is helpful for her menopausal symptoms.  She is due for Pap smear and would like to get that done today.  She has no history of abnormal Pap smears and denies any postmenopausal bleeding.  She is due for a mammogram and would like to get that scheduled.    Today we discussed the followin)  Hypertension.  Today blood pressure is good 128/76.  She is currently taking Hyzaar 100 mg once a day which she is tolerating well  2) history of HSV.  She has not had any recent outbreaks.  She does continue with the Valtrex for preventative measures  3) arthritis.  She is bothered by arthritis pain.  Most of her symptoms of the arthritis are in her shoulders and sometimes in her back.  She does use lodine and Vicodin very sparingly with 1 prescription lasting a year     Review of Systems   Constitutional: Negative for activity change, appetite change, chills, diaphoresis, fatigue, fever and unexpected weight change.   HENT: Negative for congestion, dental problem, ear pain, hearing loss, postnasal drip, rhinorrhea, sneezing, sore throat and trouble swallowing.    Eyes: Negative for photophobia, pain, discharge and visual disturbance.   Respiratory: Negative for cough, chest tightness, shortness of breath and wheezing.    Cardiovascular: Negative for chest pain, palpitations and leg swelling.   Gastrointestinal: Negative for abdominal distention, abdominal pain, blood in stool, constipation, diarrhea, nausea and vomiting.   Endocrine: Negative for cold intolerance, heat intolerance, polydipsia and polyuria.   Genitourinary: Negative for difficulty urinating, dysuria, flank pain, frequency, genital  sores, hematuria, menstrual problem and vaginal discharge.   Musculoskeletal: Positive for arthralgias and neck pain. Negative for joint swelling and myalgias.   Skin: Negative for rash.   Neurological: Negative for dizziness, syncope, weakness, light-headedness and headaches.   Hematological: Negative for adenopathy. Does not bruise/bleed easily.   Psychiatric/Behavioral: Negative for confusion, dysphoric mood, self-injury, sleep disturbance and suicidal ideas. The patient is not nervous/anxious.        Objective:      Physical Exam   Constitutional: She is oriented to person, place, and time. She appears well-developed and well-nourished.   HENT:   Head: Normocephalic and atraumatic.   Right Ear: Hearing, tympanic membrane, external ear and ear canal normal.   Left Ear: Hearing, tympanic membrane, external ear and ear canal normal.   Nose: Nose normal.   Mouth/Throat: Oropharynx is clear and moist and mucous membranes are normal.   Eyes: Pupils are equal, round, and reactive to light. Conjunctivae, EOM and lids are normal.   Neck: Normal range of motion. Neck supple. Carotid bruit is not present. No thyroid mass and no thyromegaly present.   Cardiovascular: Normal rate, regular rhythm and normal heart sounds.   No murmur heard.  Pulses:       Dorsalis pedis pulses are 2+ on the right side, and 2+ on the left side.        Posterior tibial pulses are 2+ on the right side, and 2+ on the left side.   Pulmonary/Chest: Effort normal and breath sounds normal.   Clear to auscultation bilaterally.     Abdominal: Soft. Normal appearance and bowel sounds are normal. She exhibits no abdominal bruit. There is no hepatosplenomegaly. There is no tenderness.   Genitourinary: Pelvic exam was performed with patient supine. There is no rash or lesion on the right labia. There is no rash or lesion on the left labia.   Genitourinary Comments: External genitalia appear normal.  Speculum is inserted.  Vaginal mucosa is normal.  Cervix  "is visualized and appears normal.  Pap is taken.  Bimanual exam reveals a normal sized uterus and ovaries which are non tender.   Feet:   Right Foot:   Skin Integrity: Positive for warmth and dry skin.   Lymphadenopathy:        Head (right side): No submental, no submandibular, no preauricular, no posterior auricular and no occipital adenopathy present.        Head (left side): No submental, no submandibular, no preauricular and no occipital adenopathy present.     She has no cervical adenopathy.     She has no axillary adenopathy.        Right: No supraclavicular adenopathy present.        Left: No supraclavicular adenopathy present.   Neurological: She is alert and oriented to person, place, and time. She has normal strength. No cranial nerve deficit or sensory deficit.   Skin: Skin is warm, dry and intact. No cyanosis. Nails show no clubbing.   Psychiatric: She has a normal mood and affect. Her speech is normal and behavior is normal. Thought content normal. Cognition and memory are normal.   Breast:  The breasts appear symmetric.  There is no nipple discharge or nipple inversion noted.  There are no masses palpable throughout either breast.  There is no supraclavicular or axillary lymphadenopathy.      Blood pressure 128/76, pulse 78, temperature 99 °F (37.2 °C), temperature source Oral, resp. rate 18, height 5' 1" (1.549 m), weight 63.1 kg (139 lb 3.2 oz), SpO2 98 %.Body mass index is 26.3 kg/m².          A/P:  1) annual exam.  Health maintenance issues and anticipatory guidance issues were discussed.  Immunizations are up-to-date.  We will check fasting labs.  We will schedule her mammogram.  We will contact her with results of her Pap smear and HPV and if these are normal she will be done with cervical cancer screening  given her age  2) hypertension.  Well controlled.  Continue with Hyzaar  3)   History of HSV .  Asymptomatic.  Continue with Valtrex  4) arthritis.  Intermittent.  I did refill her Lodine and " Vicodin for p.r.n. use  5) GERD with strong family history of stomach cancer.  Continue with Prilosec.  Her next EGD is due in October of 2020        As long as she does well, I will see her back in 1 year sooner if needed

## 2019-10-18 ENCOUNTER — PATIENT MESSAGE (OUTPATIENT)
Dept: FAMILY MEDICINE | Facility: CLINIC | Age: 64
End: 2019-10-18

## 2019-11-27 ENCOUNTER — PATIENT MESSAGE (OUTPATIENT)
Dept: FAMILY MEDICINE | Facility: CLINIC | Age: 64
End: 2019-11-27

## 2020-01-06 RX ORDER — ETODOLAC 500 MG/1
TABLET, FILM COATED ORAL
Qty: 180 TABLET | Refills: 0 | OUTPATIENT
Start: 2020-01-06

## 2020-01-06 RX ORDER — LOSARTAN POTASSIUM AND HYDROCHLOROTHIAZIDE 25; 100 MG/1; MG/1
TABLET ORAL
Qty: 90 TABLET | Refills: 1 | Status: SHIPPED | OUTPATIENT
Start: 2020-01-06 | End: 2020-10-16 | Stop reason: SDUPTHER

## 2020-01-21 ENCOUNTER — OFFICE VISIT (OUTPATIENT)
Dept: DERMATOLOGY | Facility: CLINIC | Age: 65
End: 2020-01-21
Payer: COMMERCIAL

## 2020-01-21 VITALS — WEIGHT: 139.13 LBS | HEIGHT: 61 IN | BODY MASS INDEX: 26.27 KG/M2

## 2020-01-21 DIAGNOSIS — Z80.8 FAMILY HISTORY OF MALIGNANT MELANOMA: ICD-10-CM

## 2020-01-21 DIAGNOSIS — L57.0 ACTINIC KERATOSES: Primary | ICD-10-CM

## 2020-01-21 DIAGNOSIS — L81.4 SOLAR LENTIGO: ICD-10-CM

## 2020-01-21 DIAGNOSIS — L82.1 SEBORRHEIC KERATOSES: ICD-10-CM

## 2020-01-21 DIAGNOSIS — D22.9 MULTIPLE BENIGN NEVI: ICD-10-CM

## 2020-01-21 PROCEDURE — 1101F PT FALLS ASSESS-DOCD LE1/YR: CPT | Mod: CPTII,S$GLB,, | Performed by: DERMATOLOGY

## 2020-01-21 PROCEDURE — 1101F PR PT FALLS ASSESS DOC 0-1 FALLS W/OUT INJ PAST YR: ICD-10-PCS | Mod: CPTII,S$GLB,, | Performed by: DERMATOLOGY

## 2020-01-21 PROCEDURE — 99203 PR OFFICE/OUTPT VISIT, NEW, LEVL III, 30-44 MIN: ICD-10-PCS | Mod: 25,S$GLB,, | Performed by: DERMATOLOGY

## 2020-01-21 PROCEDURE — 99999 PR PBB SHADOW E&M-EST. PATIENT-LVL III: ICD-10-PCS | Mod: PBBFAC,,, | Performed by: DERMATOLOGY

## 2020-01-21 PROCEDURE — 3008F BODY MASS INDEX DOCD: CPT | Mod: CPTII,S$GLB,, | Performed by: DERMATOLOGY

## 2020-01-21 PROCEDURE — 17003 DESTRUCT PREMALG LES 2-14: CPT | Mod: S$GLB,,, | Performed by: DERMATOLOGY

## 2020-01-21 PROCEDURE — 99203 OFFICE O/P NEW LOW 30 MIN: CPT | Mod: 25,S$GLB,, | Performed by: DERMATOLOGY

## 2020-01-21 PROCEDURE — 99999 PR PBB SHADOW E&M-EST. PATIENT-LVL III: CPT | Mod: PBBFAC,,, | Performed by: DERMATOLOGY

## 2020-01-21 PROCEDURE — 17000 DESTRUCT PREMALG LESION: CPT | Mod: S$GLB,,, | Performed by: DERMATOLOGY

## 2020-01-21 PROCEDURE — 17000 PR DESTRUCTION(LASER SURGERY,CRYOSURGERY,CHEMOSURGERY),PREMALIGNANT LESIONS,FIRST LESION: ICD-10-PCS | Mod: S$GLB,,, | Performed by: DERMATOLOGY

## 2020-01-21 PROCEDURE — 3008F PR BODY MASS INDEX (BMI) DOCUMENTED: ICD-10-PCS | Mod: CPTII,S$GLB,, | Performed by: DERMATOLOGY

## 2020-01-21 PROCEDURE — 17003 DESTRUCTION, PREMALIGNANT LESIONS; SECOND THROUGH 14 LESIONS: ICD-10-PCS | Mod: S$GLB,,, | Performed by: DERMATOLOGY

## 2020-01-21 NOTE — PATIENT INSTRUCTIONS

## 2020-01-21 NOTE — PROGRESS NOTES
Subjective:       Patient ID:  Meka Ann is a 64 y.o. female who presents for   Chief Complaint   Patient presents with    Spot     R shoulder, x 2 m, no tx, not smooth     New Patient    Phx of SCC on R knee about 15 yrs ago  Previously under the care of Dr Jacey Yañez  Intense sun exposure in youth    +Fhx of MM; mother,  corona    Patient is here today for a spot on her R shoulder that she has had for a couple of months.  She states that it is just there and is not smooth.  Patient is not treating the area.    Patient also desires a TBSE.      Review of Systems   Constitutional: Negative for fever, chills and fatigue.   Skin: Positive for daily sunscreen use and activity-related sunscreen use. Negative for wears hat.   Hematologic/Lymphatic: Does not bruise/bleed easily.        Objective:    Physical Exam   Constitutional: She appears well-developed and well-nourished. No distress.   Neurological: She is alert and oriented to person, place, and time. She is not disoriented.   Psychiatric: She has a normal mood and affect.   Skin:   Areas Examined (abnormalities noted in diagram):   Scalp / Hair Palpated and Inspected  Head / Face Inspection Performed  Neck Inspection Performed  Chest / Axilla Inspection Performed  Abdomen Inspection Performed  Genitals / Buttocks / Groin Inspection Performed  Back Inspection Performed  RUE Inspected  LUE Inspection Performed  RLE Inspected  LLE Inspection Performed  Nails and Digits Inspection Performed                           Diagram Legend     Erythematous scaling macule/papule c/w actinic keratosis       Vascular papule c/w angioma      Pigmented verrucoid papule/plaque c/w seborrheic keratosis      Yellow umbilicated papule c/w sebaceous hyperplasia      Irregularly shaped tan macule c/w lentigo     1-2 mm smooth white papules consistent with Milia      Movable subcutaneous cyst with punctum c/w epidermal inclusion cyst      Subcutaneous movable cyst c/w pilar  cyst      Firm pink to brown papule c/w dermatofibroma      Pedunculated fleshy papule(s) c/w skin tag(s)      Evenly pigmented macule c/w junctional nevus     Mildly variegated pigmented, slightly irregular-bordered macule c/w mildly atypical nevus      Flesh colored to evenly pigmented papule c/w intradermal nevus       Pink pearly papule/plaque c/w basal cell carcinoma      Erythematous hyperkeratotic cursted plaque c/w SCC      Surgical scar with no sign of skin cancer recurrence      Open and closed comedones      Inflammatory papules and pustules      Verrucoid papule consistent consistent with wart     Erythematous eczematous patches and plaques     Dystrophic onycholytic nail with subungual debris c/w onychomycosis     Umbilicated papule    Erythematous-base heme-crusted tan verrucoid plaque consistent with inflamed seborrheic keratosis     Erythematous Silvery Scaling Plaque c/w Psoriasis     See annotation      Assessment / Plan:        Actinic keratoses  Cryosurgery Procedure Note    Verbal consent from the patient is obtained and the patient is aware of the precancerous quality and need for treatment of these lesions. Liquid nitrogen cryosurgery is applied to the 2 actinic keratoses, as detailed in the physical exam, to produce a freeze injury. The patient is aware that blisters may form and is instructed on wound care with gentle cleansing and use of vaseline ointment to keep moist until healed. The patient is supplied a handout on cryosurgery and is instructed to call if lesions do not completely resolve.    Multiple benign nevi   Monitor for new mole or moles that are becoming bigger, darker, irritated, or developing irregular borders. Brochure provided.    Solar lentigo  This is a benign hyperpigmented sun induced lesion. Daily sun protection will reduce the number of new lesions. Treatment of these benign lesions are considered cosmetic.    Seborrheic keratoses  These are benign inherited growths  without a malignant potential. Reassurance given to patient. No treatment is necessary.     Family history of malignant melanoma  Mother,  metas  Total body skin examination performed today including at least 12 points as noted in physical examination. No lesions suspicious for malignancy noted.    Patient instructed in importance in daily sun protection of at least spf 30. Mineral sunscreen ingredients preferred (Zinc +/- Titanium).   Recommend Elta MD for daily use on face and neck.  Patient encouraged to wear hat for all outdoor exposure.   Also discussed sun avoidance and use of protective clothing.           Follow up in about 1 year (around 2021).

## 2020-05-05 ENCOUNTER — PATIENT MESSAGE (OUTPATIENT)
Dept: ADMINISTRATIVE | Facility: HOSPITAL | Age: 65
End: 2020-05-05

## 2020-05-12 RX ORDER — NORETHINDRONE ACETATE AND ETHINYL ESTRADIOL .005; 1 MG/1; MG/1
TABLET, FILM COATED ORAL
Qty: 84 TABLET | Refills: 1 | Status: SHIPPED | OUTPATIENT
Start: 2020-05-12 | End: 2020-10-16 | Stop reason: SDUPTHER

## 2020-09-09 ENCOUNTER — TELEPHONE (OUTPATIENT)
Dept: FAMILY MEDICINE | Facility: CLINIC | Age: 65
End: 2020-09-09

## 2020-09-09 DIAGNOSIS — E78.5 HYPERLIPIDEMIA, UNSPECIFIED HYPERLIPIDEMIA TYPE: Primary | ICD-10-CM

## 2020-09-09 NOTE — TELEPHONE ENCOUNTER
----- Message from Zeynep Lama MA sent at 9/9/2020 11:18 AM CDT -----  Regarding: lab orders  Requesting lab orders before appt  Appt 10/9  Best Call Back # [phone]

## 2020-10-02 ENCOUNTER — LAB VISIT (OUTPATIENT)
Dept: LAB | Facility: HOSPITAL | Age: 65
End: 2020-10-02
Attending: FAMILY MEDICINE
Payer: COMMERCIAL

## 2020-10-02 DIAGNOSIS — E78.5 HYPERLIPIDEMIA, UNSPECIFIED HYPERLIPIDEMIA TYPE: ICD-10-CM

## 2020-10-02 LAB
ALBUMIN SERPL BCP-MCNC: 4 G/DL (ref 3.5–5.2)
ALP SERPL-CCNC: 57 U/L (ref 55–135)
ALT SERPL W/O P-5'-P-CCNC: 17 U/L (ref 10–44)
ANION GAP SERPL CALC-SCNC: 11 MMOL/L (ref 8–16)
AST SERPL-CCNC: 17 U/L (ref 10–40)
BASOPHILS # BLD AUTO: 0.05 K/UL (ref 0–0.2)
BASOPHILS NFR BLD: 0.8 % (ref 0–1.9)
BILIRUB SERPL-MCNC: 0.5 MG/DL (ref 0.1–1)
BUN SERPL-MCNC: 18 MG/DL (ref 8–23)
CALCIUM SERPL-MCNC: 9.1 MG/DL (ref 8.7–10.5)
CHLORIDE SERPL-SCNC: 107 MMOL/L (ref 95–110)
CHOLEST SERPL-MCNC: 171 MG/DL (ref 120–199)
CHOLEST/HDLC SERPL: 3.4 {RATIO} (ref 2–5)
CO2 SERPL-SCNC: 24 MMOL/L (ref 23–29)
CREAT SERPL-MCNC: 0.8 MG/DL (ref 0.5–1.4)
DIFFERENTIAL METHOD: NORMAL
EOSINOPHIL # BLD AUTO: 0.2 K/UL (ref 0–0.5)
EOSINOPHIL NFR BLD: 2.9 % (ref 0–8)
ERYTHROCYTE [DISTWIDTH] IN BLOOD BY AUTOMATED COUNT: 13.2 % (ref 11.5–14.5)
EST. GFR  (AFRICAN AMERICAN): >60 ML/MIN/1.73 M^2
EST. GFR  (NON AFRICAN AMERICAN): >60 ML/MIN/1.73 M^2
ESTIMATED AVG GLUCOSE: 111 MG/DL (ref 68–131)
GLUCOSE SERPL-MCNC: 83 MG/DL (ref 70–110)
HBA1C MFR BLD HPLC: 5.5 % (ref 4–5.6)
HCT VFR BLD AUTO: 41.5 % (ref 37–48.5)
HDLC SERPL-MCNC: 50 MG/DL (ref 40–75)
HDLC SERPL: 29.2 % (ref 20–50)
HGB BLD-MCNC: 13.5 G/DL (ref 12–16)
IMM GRANULOCYTES # BLD AUTO: 0.01 K/UL (ref 0–0.04)
IMM GRANULOCYTES NFR BLD AUTO: 0.2 % (ref 0–0.5)
LDLC SERPL CALC-MCNC: 105.2 MG/DL (ref 63–159)
LYMPHOCYTES # BLD AUTO: 2.4 K/UL (ref 1–4.8)
LYMPHOCYTES NFR BLD: 38.2 % (ref 18–48)
MCH RBC QN AUTO: 30.8 PG (ref 27–31)
MCHC RBC AUTO-ENTMCNC: 32.5 G/DL (ref 32–36)
MCV RBC AUTO: 95 FL (ref 82–98)
MONOCYTES # BLD AUTO: 0.7 K/UL (ref 0.3–1)
MONOCYTES NFR BLD: 10.4 % (ref 4–15)
NEUTROPHILS # BLD AUTO: 3 K/UL (ref 1.8–7.7)
NEUTROPHILS NFR BLD: 47.5 % (ref 38–73)
NONHDLC SERPL-MCNC: 121 MG/DL
NRBC BLD-RTO: 0 /100 WBC
PLATELET # BLD AUTO: 275 K/UL (ref 150–350)
PMV BLD AUTO: 10.8 FL (ref 9.2–12.9)
POTASSIUM SERPL-SCNC: 4.1 MMOL/L (ref 3.5–5.1)
PROT SERPL-MCNC: 7.1 G/DL (ref 6–8.4)
RBC # BLD AUTO: 4.39 M/UL (ref 4–5.4)
SODIUM SERPL-SCNC: 142 MMOL/L (ref 136–145)
TRIGL SERPL-MCNC: 79 MG/DL (ref 30–150)
TSH SERPL DL<=0.005 MIU/L-ACNC: 0.74 UIU/ML (ref 0.4–4)
WBC # BLD AUTO: 6.25 K/UL (ref 3.9–12.7)

## 2020-10-02 PROCEDURE — 80053 COMPREHEN METABOLIC PANEL: CPT

## 2020-10-02 PROCEDURE — 83036 HEMOGLOBIN GLYCOSYLATED A1C: CPT

## 2020-10-02 PROCEDURE — 80061 LIPID PANEL: CPT

## 2020-10-02 PROCEDURE — 85025 COMPLETE CBC W/AUTO DIFF WBC: CPT

## 2020-10-02 PROCEDURE — 84443 ASSAY THYROID STIM HORMONE: CPT

## 2020-10-02 PROCEDURE — 36415 COLL VENOUS BLD VENIPUNCTURE: CPT | Mod: PO

## 2020-10-04 ENCOUNTER — PATIENT MESSAGE (OUTPATIENT)
Dept: FAMILY MEDICINE | Facility: CLINIC | Age: 65
End: 2020-10-04

## 2020-10-16 ENCOUNTER — OFFICE VISIT (OUTPATIENT)
Dept: FAMILY MEDICINE | Facility: CLINIC | Age: 65
End: 2020-10-16
Payer: COMMERCIAL

## 2020-10-16 VITALS
WEIGHT: 141.19 LBS | HEIGHT: 61 IN | SYSTOLIC BLOOD PRESSURE: 136 MMHG | RESPIRATION RATE: 18 BRPM | TEMPERATURE: 98 F | DIASTOLIC BLOOD PRESSURE: 88 MMHG | BODY MASS INDEX: 26.66 KG/M2 | HEART RATE: 82 BPM

## 2020-10-16 DIAGNOSIS — I10 HYPERTENSION, ESSENTIAL: ICD-10-CM

## 2020-10-16 DIAGNOSIS — Z00.00 ANNUAL PHYSICAL EXAM: Primary | ICD-10-CM

## 2020-10-16 DIAGNOSIS — Z12.2 ENCOUNTER FOR SCREENING FOR MALIGNANT NEOPLASM OF RESPIRATORY ORGANS: ICD-10-CM

## 2020-10-16 PROCEDURE — 3075F SYST BP GE 130 - 139MM HG: CPT | Mod: CPTII,S$GLB,, | Performed by: FAMILY MEDICINE

## 2020-10-16 PROCEDURE — 99397 PR PREVENTIVE VISIT,EST,65 & OVER: ICD-10-PCS | Mod: S$GLB,25,, | Performed by: FAMILY MEDICINE

## 2020-10-16 PROCEDURE — 99397 PER PM REEVAL EST PAT 65+ YR: CPT | Mod: S$GLB,25,, | Performed by: FAMILY MEDICINE

## 2020-10-16 PROCEDURE — 90471 IMMUNIZATION ADMIN: CPT | Mod: S$GLB,,, | Performed by: FAMILY MEDICINE

## 2020-10-16 PROCEDURE — 90471 PNEUMOCOCCAL POLYSACCHARIDE VACCINE 23-VALENT =>2YO SQ IM: ICD-10-PCS | Mod: S$GLB,,, | Performed by: FAMILY MEDICINE

## 2020-10-16 PROCEDURE — 3079F PR MOST RECENT DIASTOLIC BLOOD PRESSURE 80-89 MM HG: ICD-10-PCS | Mod: CPTII,S$GLB,, | Performed by: FAMILY MEDICINE

## 2020-10-16 PROCEDURE — 3075F PR MOST RECENT SYSTOLIC BLOOD PRESS GE 130-139MM HG: ICD-10-PCS | Mod: CPTII,S$GLB,, | Performed by: FAMILY MEDICINE

## 2020-10-16 PROCEDURE — 3079F DIAST BP 80-89 MM HG: CPT | Mod: CPTII,S$GLB,, | Performed by: FAMILY MEDICINE

## 2020-10-16 PROCEDURE — 90732 PNEUMOCOCCAL POLYSACCHARIDE VACCINE 23-VALENT =>2YO SQ IM: ICD-10-PCS | Mod: S$GLB,,, | Performed by: FAMILY MEDICINE

## 2020-10-16 PROCEDURE — 90732 PPSV23 VACC 2 YRS+ SUBQ/IM: CPT | Mod: S$GLB,,, | Performed by: FAMILY MEDICINE

## 2020-10-16 RX ORDER — OMEPRAZOLE 40 MG/1
CAPSULE, DELAYED RELEASE ORAL
Qty: 90 CAPSULE | Refills: 3 | Status: SHIPPED | OUTPATIENT
Start: 2020-10-16 | End: 2021-08-27 | Stop reason: SDUPTHER

## 2020-10-16 RX ORDER — CLOTRIMAZOLE AND BETAMETHASONE DIPROPIONATE 10; .64 MG/G; MG/G
CREAM TOPICAL 2 TIMES DAILY
Qty: 45 G | Refills: 1 | Status: SHIPPED | OUTPATIENT
Start: 2020-10-16 | End: 2021-01-07

## 2020-10-16 RX ORDER — HYDROCODONE BITARTRATE AND ACETAMINOPHEN 5; 300 MG/1; MG/1
1 TABLET ORAL EVERY 6 HOURS PRN
Qty: 28 EACH | Refills: 0 | Status: SHIPPED | OUTPATIENT
Start: 2020-10-16 | End: 2020-10-22

## 2020-10-16 RX ORDER — NORETHINDRONE ACETATE AND ETHINYL ESTRADIOL 1; 5 MG/1; UG/1
1 TABLET ORAL DAILY
Qty: 84 TABLET | Refills: 1 | Status: SHIPPED | OUTPATIENT
Start: 2020-10-16 | End: 2021-09-12

## 2020-10-16 RX ORDER — ETODOLAC 500 MG/1
TABLET, FILM COATED ORAL
Qty: 180 TABLET | Refills: 0 | Status: SHIPPED | OUTPATIENT
Start: 2020-10-16 | End: 2022-12-09 | Stop reason: SDUPTHER

## 2020-10-16 RX ORDER — LOSARTAN POTASSIUM AND HYDROCHLOROTHIAZIDE 25; 100 MG/1; MG/1
1 TABLET ORAL DAILY
Qty: 90 TABLET | Refills: 2 | Status: SHIPPED | OUTPATIENT
Start: 2020-10-16 | End: 2021-07-29

## 2020-10-16 RX ORDER — VALACYCLOVIR HYDROCHLORIDE 500 MG/1
500 TABLET, FILM COATED ORAL DAILY
Qty: 90 TABLET | Refills: 3 | Status: SHIPPED | OUTPATIENT
Start: 2020-10-16 | End: 2022-01-04 | Stop reason: SDUPTHER

## 2020-10-18 ENCOUNTER — PATIENT MESSAGE (OUTPATIENT)
Dept: FAMILY MEDICINE | Facility: CLINIC | Age: 65
End: 2020-10-18

## 2020-10-18 NOTE — PROGRESS NOTES
Subjective:       Patient ID: Meka Ann is a 64 y.o. female.    Chief Complaint: Annual Exam (Physical with breast exam, medication follow up )    HPI   The patient is a 64-year-old who is here today for her annual exam.  Overall, she is doing well although her  recently had a bout of cardiomyopathy this summer and is recovering from that.  She is trying to consume healthy diet.  She recently had labs which reviewed today and which all looked good.  She is coming due for her mammogram and DEXA scan and would be willing to do these.  Given her smoking history, she does qualify for low-dose lung CT which she would be willing to do.  She is hesitant to consider vaccinations but ultimately did agree to have her Pneumovax today and will come back later for her flu vaccine.    Today we discussed the followin)  Hypertension.  She is taking her Hyzaar.  Today, her blood pressure was initially 140/80 and then on recheck was 136/88.  She does have a lot of stress at home as she has a lot of visitors that have been here on an extended trip.  She believes her blood pressure is usually better than it is today usually in the 130s  2)  low back pain with history of degenerative disc disease.  She does need a refill of her hydrocodone and lodine.  She uses these very sparingly.  Usually 30 hydrocodone well last an entire year  3) history of HSV.  She does need refill of her Valtrex.  4) family history of stomach cancer with personal history of GERD.    She is taking her Prilosec consistently.  She is due for an EGD.  She did receive a letter asking her to call to schedule this and she does plan to do so.  5) HRT.  She does need a refill of her hormones.  This does work well for her and she feels that she needs to continue with it.      Review of Systems   Constitutional: Negative for appetite change, chills, diaphoresis, fatigue, fever and unexpected weight change.   HENT: Negative for congestion, dental problem,  ear pain, hearing loss, postnasal drip, rhinorrhea, sneezing, sore throat and trouble swallowing.    Eyes: Negative for photophobia, pain, discharge and visual disturbance.   Respiratory: Negative for cough, chest tightness, shortness of breath and wheezing.    Cardiovascular: Negative for chest pain, palpitations and leg swelling.   Gastrointestinal: Negative for abdominal distention, abdominal pain, blood in stool, constipation, diarrhea, nausea and vomiting.   Endocrine: Negative for cold intolerance, heat intolerance, polydipsia and polyuria.   Genitourinary: Negative for dysuria, flank pain, frequency, genital sores, hematuria, menstrual problem and vaginal discharge.   Musculoskeletal: Positive for back pain. Negative for arthralgias, joint swelling and myalgias.   Skin: Negative for rash.   Neurological: Negative for dizziness, syncope, light-headedness and headaches.   Hematological: Negative for adenopathy. Does not bruise/bleed easily.   Psychiatric/Behavioral: Negative for dysphoric mood, self-injury, sleep disturbance and suicidal ideas. The patient is not nervous/anxious.        Objective:      Physical Exam  Constitutional:       General: She is not in acute distress.     Appearance: Normal appearance. She is well-developed.   HENT:      Head: Normocephalic and atraumatic.      Right Ear: Hearing, tympanic membrane, ear canal and external ear normal.      Left Ear: Hearing, tympanic membrane, ear canal and external ear normal.      Nose: Nose normal.      Mouth/Throat:      Mouth: No oral lesions.      Pharynx: No oropharyngeal exudate or posterior oropharyngeal erythema.   Eyes:      General: Lids are normal. No scleral icterus.     Extraocular Movements: Extraocular movements intact.      Conjunctiva/sclera: Conjunctivae normal.      Pupils: Pupils are equal, round, and reactive to light.   Neck:      Musculoskeletal: Normal range of motion and neck supple.      Thyroid: No thyroid mass or  "thyromegaly.      Vascular: No carotid bruit.   Cardiovascular:      Rate and Rhythm: Normal rate and regular rhythm.  No extrasystoles are present.     Chest Wall: PMI is not displaced.      Heart sounds: Normal heart sounds. No murmur. No gallop.    Pulmonary:      Effort: Pulmonary effort is normal. No accessory muscle usage or respiratory distress.      Breath sounds: Normal breath sounds.   Chest:      Comments: Breast:  The breasts appear symmetric.  There is no nipple discharge or nipple inversion noted.  There are no masses palpable throughout either breast.  There is no supraclavicular or axillary lymphadenopathy.    Abdominal:      General: Bowel sounds are normal. There is no abdominal bruit.      Palpations: Abdomen is soft.      Tenderness: There is no abdominal tenderness. There is no rebound.   Lymphadenopathy:      Head:      Right side of head: No submental or submandibular adenopathy.      Left side of head: No submental or submandibular adenopathy.      Cervical:      Right cervical: No superficial, deep or posterior cervical adenopathy.     Left cervical: No superficial, deep or posterior cervical adenopathy.      Upper Body:      Right upper body: No supraclavicular adenopathy.      Left upper body: No supraclavicular adenopathy.   Skin:     General: Skin is warm and dry.   Neurological:      Mental Status: She is alert and oriented to person, place, and time.      Cranial Nerves: No cranial nerve deficit.      Sensory: No sensory deficit.   Psychiatric:         Speech: Speech normal.         Behavior: Behavior normal.         Thought Content: Thought content normal.       Blood pressure 136/88, pulse 82, temperature 98.2 °F (36.8 °C), temperature source Temporal, resp. rate 18, height 5' 1" (1.549 m), weight 64 kg (141 lb 3.3 oz).Body mass index is 26.68 kg/m².          A/P:  1) annual exam.  Health maintenance issues and anticipatory guidance issues were discussed.  She was given her Pneumovax " today.  She will return for her flu shot soon.  We will schedule her mammogram, DEXA scan at low-dose lung CT  2) osteopenia.  Status unknown.  We are repeating a DEXA scan as above  3) hypertension.  Fairly well controlled.  Given her history of hypertensive retinopathy, we may decide to be more aggressive with her blood pressure control.  For now she will continue with her Hyzaar but we may consider additional agents in the future   4)  Chronic low back pain with history of degenerative disc disease.  Intermittent.  I did refill her Norco and lodeine  5) history of HSV.  Well controlled.  Continue with Valtrex   6)  Family history of stomach cancer and personal history of GERD.  Continue with Prilosec.  She will schedule her EGD   7)  HRT.  She understands the risks and benefits of HRT.  She will continue with yearly mammograms.  I did refill her HRT today

## 2020-10-19 NOTE — TELEPHONE ENCOUNTER
Fax received from [employer].  Patient's plan does not cover the Hydrocodone 5-300.  Plan does cover Norco 5/325.  Please advise

## 2020-10-21 ENCOUNTER — TELEPHONE (OUTPATIENT)
Dept: FAMILY MEDICINE | Facility: CLINIC | Age: 65
End: 2020-10-21

## 2020-10-21 RX ORDER — HYDROCODONE BITARTRATE AND ACETAMINOPHEN 5; 325 MG/1; MG/1
1 TABLET ORAL EVERY 6 HOURS PRN
Qty: 28 TABLET | Refills: 0 | Status: SHIPPED | OUTPATIENT
Start: 2020-10-21 | End: 2021-04-06

## 2020-10-21 RX ORDER — AMLODIPINE BESYLATE 5 MG/1
5 TABLET ORAL DAILY
Qty: 30 TABLET | Refills: 2 | Status: SHIPPED | OUTPATIENT
Start: 2020-10-21 | End: 2020-10-28 | Stop reason: SDUPTHER

## 2020-10-21 NOTE — TELEPHONE ENCOUNTER
----- Message from Hayley Aguirre sent at 10/21/2020 12:22 PM CDT -----  Contact: self  Type:  Patient Returning Call    Who Called:  Patient   Who Left Message for Patient:  Xenia  Does the patient know what this is regarding?:    Best Call Back Number:  [phone]  Additional Information:  contact regarding blood pressure check visit please contact to advise if this is a nurse visit. Patient requesting a morning visit

## 2020-10-22 DIAGNOSIS — M51.36 DDD (DEGENERATIVE DISC DISEASE), LUMBAR: Primary | ICD-10-CM

## 2020-10-22 RX ORDER — HYDROCODONE BITARTRATE AND ACETAMINOPHEN 5; 325 MG/1; MG/1
1 TABLET ORAL EVERY 6 HOURS PRN
Qty: 28 TABLET | Refills: 0 | Status: SHIPPED | OUTPATIENT
Start: 2020-10-22 | End: 2022-12-09 | Stop reason: SDUPTHER

## 2020-10-22 NOTE — TELEPHONE ENCOUNTER
Fax received from pharmacy advising that an initial 7 day supply of the Hydrocodone before it can be filled by mail order.  Instructions request that a 7 day supply be sent to local pharmacy.  Advises that the mail order prescription will be on hold for 24 days.  Please advise. Pharmacy was changed to her local pharmacy.

## 2020-10-23 ENCOUNTER — HOSPITAL ENCOUNTER (OUTPATIENT)
Dept: RADIOLOGY | Facility: HOSPITAL | Age: 65
Discharge: HOME | End: 2020-10-23
Attending: FAMILY MEDICINE
Payer: COMMERCIAL

## 2020-10-23 DIAGNOSIS — Z00.00 ANNUAL PHYSICAL EXAM: ICD-10-CM

## 2020-10-23 DIAGNOSIS — Z12.2 ENCOUNTER FOR SCREENING FOR MALIGNANT NEOPLASM OF RESPIRATORY ORGANS: ICD-10-CM

## 2020-10-23 PROCEDURE — 77080 DXA BONE DENSITY AXIAL: CPT | Mod: TC,PO

## 2020-10-23 PROCEDURE — G0297 CT CHEST LUNG SCREENING LOW DOSE: ICD-10-PCS | Mod: 26,,, | Performed by: RADIOLOGY

## 2020-10-23 PROCEDURE — 77080 DXA BONE DENSITY AXIAL: CPT | Mod: 26,,, | Performed by: RADIOLOGY

## 2020-10-23 PROCEDURE — G0297 LDCT FOR LUNG CA SCREEN: HCPCS | Mod: TC,PO

## 2020-10-23 PROCEDURE — G0297 LDCT FOR LUNG CA SCREEN: HCPCS | Mod: 26,,, | Performed by: RADIOLOGY

## 2020-10-23 PROCEDURE — 77080 DEXA BONE DENSITY SPINE HIP: ICD-10-PCS | Mod: 26,,, | Performed by: RADIOLOGY

## 2020-10-24 ENCOUNTER — PATIENT MESSAGE (OUTPATIENT)
Dept: FAMILY MEDICINE | Facility: CLINIC | Age: 65
End: 2020-10-24

## 2020-10-24 DIAGNOSIS — R91.8 LUNG NODULES: Primary | ICD-10-CM

## 2020-10-28 RX ORDER — AMLODIPINE BESYLATE 5 MG/1
5 TABLET ORAL DAILY
Qty: 30 TABLET | Refills: 1 | Status: SHIPPED | OUTPATIENT
Start: 2020-10-28 | End: 2020-11-11 | Stop reason: SDUPTHER

## 2020-10-28 NOTE — TELEPHONE ENCOUNTER
----- Message from Hayley Pradhan sent at 10/28/2020 10:11 AM CDT -----  Type: Needs Medication reordered    Who Called:  Patient  Best Call Back Number: [phone]  Additional Information:  Patient needs new blood pressure medication reordered/stated [facility] will not order/requesting it be ordered at [facility] at [address] instead/please reorder and call patient back to advise.

## 2020-11-11 ENCOUNTER — TELEPHONE (OUTPATIENT)
Dept: FAMILY MEDICINE | Facility: CLINIC | Age: 65
End: 2020-11-11

## 2020-11-11 ENCOUNTER — CLINICAL SUPPORT (OUTPATIENT)
Dept: FAMILY MEDICINE | Facility: CLINIC | Age: 65
End: 2020-11-11
Payer: COMMERCIAL

## 2020-11-11 VITALS
SYSTOLIC BLOOD PRESSURE: 124 MMHG | HEART RATE: 84 BPM | DIASTOLIC BLOOD PRESSURE: 78 MMHG | RESPIRATION RATE: 16 BRPM | OXYGEN SATURATION: 99 %

## 2020-11-11 RX ORDER — AMLODIPINE BESYLATE 5 MG/1
5 TABLET ORAL DAILY
Qty: 90 TABLET | Refills: 2 | Status: SHIPPED | OUTPATIENT
Start: 2020-11-11 | End: 2020-12-22

## 2020-11-11 NOTE — TELEPHONE ENCOUNTER
----- Message from Marci Menezes sent at 11/11/2020  1:19 PM CST -----  Contact: SELF [phone]  Patient would like to speak with you about a personal matter. Please advise

## 2020-11-11 NOTE — PROGRESS NOTES
Pt here for BP check.   Meka Ann 64 y.o. female is here today for Blood Pressure check.   History of HTN yes.    Review of patient's allergies indicates:   Allergen Reactions    Septra [sulfamethoxazole-trimethoprim] Hives     Creatinine   Date Value Ref Range Status   10/02/2020 0.8 0.5 - 1.4 mg/dL Final     Sodium   Date Value Ref Range Status   10/02/2020 142 136 - 145 mmol/L Final     Potassium   Date Value Ref Range Status   10/02/2020 4.1 3.5 - 5.1 mmol/L Final   ]  Patient verifies taking blood pressure medications on a regular basis at the same time of the day.     Current Outpatient Medications:     amLODIPine (NORVASC) 5 MG tablet, Take 1 tablet (5 mg total) by mouth once daily., Disp: 30 tablet, Rfl: 1    calcium-vitamin D (OSCAL) 250 (625)-125 mg-unit per tablet, Take 1 tablet by mouth once daily., Disp: , Rfl:     clotrimazole-betamethasone 1-0.05% (LOTRISONE) cream, Apply topically 2 (two) times daily., Disp: 45 g, Rfl: 1    etodolac (LODINE) 500 MG tablet, TAKE 1 TABLET(500 MG) BY MOUTH TWICE DAILY AS NEEDED, Disp: 180 tablet, Rfl: 0    HYDROcodone-acetaminophen (NORCO) 5-325 mg per tablet, Take 1 tablet by mouth every 6 (six) hours as needed for Pain., Disp: 28 tablet, Rfl: 0    HYDROcodone-acetaminophen (NORCO) 5-325 mg per tablet, Take 1 tablet by mouth every 6 (six) hours as needed for Pain., Disp: 28 tablet, Rfl: 0    losartan-hydrochlorothiazide 100-25 mg (HYZAAR) 100-25 mg per tablet, Take 1 tablet by mouth once daily., Disp: 90 tablet, Rfl: 2    magnesium oxide-Mg AA chelate (MAGNESIUM, AMINO ACID CHELATE,) 133 mg Tab, Take 700 mg by mouth once daily., Disp: , Rfl:     multivitamin capsule, Take 1 capsule by mouth once daily., Disp: , Rfl:     norethindrone-ethinyl estradiol (FYAVOLV) 1-5 mg-mcg Tab, Take 1 tablet by mouth once daily., Disp: 84 tablet, Rfl: 1    omeprazole (PRILOSEC) 40 MG capsule, TAKE 1 CAPSULE(40 MG) BY MOUTH BEFORE BREAKFAST, Disp: 90 capsule, Rfl: 3     valACYclovir (VALTREX) 500 MG tablet, Take 1 tablet (500 mg total) by mouth once daily., Disp: 90 tablet, Rfl: 3  Does patient have record of home blood pressure readings no.   Last dose of blood pressure medication was taken at 7:00 am today   Patient is asymptomatic.         ,   .    Blood pressure reading after 15 minutes was 124/ 78 , Pulse 84  Dr. Katz  notified.

## 2020-11-11 NOTE — TELEPHONE ENCOUNTER
Pt here for BP check today . /78, Pulse 84  Pt taking Losartan / /25 mg 1 tablet in the am . Pt taking Norvasc 5 mg  at night. Can pt take both meds in the morning ? Pls advise . Pt would like 90 day supply of Norvasc sent to [facility] p[facility].--lp

## 2020-11-17 ENCOUNTER — TELEPHONE (OUTPATIENT)
Dept: GASTROENTEROLOGY | Facility: CLINIC | Age: 65
End: 2020-11-17

## 2020-11-17 NOTE — TELEPHONE ENCOUNTER
----- Message from Agnieszka Campbell sent at 11/17/2020  2:44 PM CST -----  Regarding: Procedure scheduling  Contact: Patient  Type: Needs Medical Advice    Who Called:  Patient    Best Call Back Number: [phone]    Additional Information:   Patient calling to schedule EGD . States she does this preventatively due to stomach cancer in her family. Would like to schedule procedure, not an in office appt.  Last done October 2017

## 2020-12-13 ENCOUNTER — PATIENT MESSAGE (OUTPATIENT)
Dept: FAMILY MEDICINE | Facility: CLINIC | Age: 65
End: 2020-12-13

## 2021-01-19 DIAGNOSIS — M51.36 DEGENERATIVE DISC DISEASE, LUMBAR: Primary | ICD-10-CM

## 2021-01-23 RX ORDER — ETODOLAC 500 MG/1
TABLET, FILM COATED ORAL
Qty: 180 TABLET | Refills: 0 | OUTPATIENT
Start: 2021-01-23

## 2021-01-25 ENCOUNTER — PATIENT MESSAGE (OUTPATIENT)
Dept: FAMILY MEDICINE | Facility: CLINIC | Age: 66
End: 2021-01-25

## 2021-03-07 ENCOUNTER — PATIENT OUTREACH (OUTPATIENT)
Dept: ADMINISTRATIVE | Facility: OTHER | Age: 66
End: 2021-03-07

## 2021-03-09 ENCOUNTER — OFFICE VISIT (OUTPATIENT)
Dept: DERMATOLOGY | Facility: CLINIC | Age: 66
End: 2021-03-09
Payer: COMMERCIAL

## 2021-03-09 DIAGNOSIS — Z85.828 HISTORY OF NONMELANOMA SKIN CANCER: ICD-10-CM

## 2021-03-09 DIAGNOSIS — L90.5 SCAR: ICD-10-CM

## 2021-03-09 DIAGNOSIS — D22.9 MULTIPLE BENIGN NEVI: ICD-10-CM

## 2021-03-09 DIAGNOSIS — D48.5 NEOPLASM OF UNCERTAIN BEHAVIOR OF SKIN: Primary | ICD-10-CM

## 2021-03-09 DIAGNOSIS — L82.1 SEBORRHEIC KERATOSES: ICD-10-CM

## 2021-03-09 DIAGNOSIS — Q82.8 POROKERATOSIS: ICD-10-CM

## 2021-03-09 DIAGNOSIS — L81.4 SOLAR LENTIGO: ICD-10-CM

## 2021-03-09 PROCEDURE — 11103 TANGNTL BX SKIN EA SEP/ADDL: CPT | Mod: S$GLB,,, | Performed by: DERMATOLOGY

## 2021-03-09 PROCEDURE — 99213 PR OFFICE/OUTPT VISIT, EST, LEVL III, 20-29 MIN: ICD-10-PCS | Mod: 25,S$GLB,, | Performed by: DERMATOLOGY

## 2021-03-09 PROCEDURE — 99999 PR PBB SHADOW E&M-EST. PATIENT-LVL III: CPT | Mod: PBBFAC,,, | Performed by: DERMATOLOGY

## 2021-03-09 PROCEDURE — 1159F MED LIST DOCD IN RCRD: CPT | Mod: S$GLB,,, | Performed by: DERMATOLOGY

## 2021-03-09 PROCEDURE — 99213 OFFICE O/P EST LOW 20 MIN: CPT | Mod: 25,S$GLB,, | Performed by: DERMATOLOGY

## 2021-03-09 PROCEDURE — 99999 PR PBB SHADOW E&M-EST. PATIENT-LVL III: ICD-10-PCS | Mod: PBBFAC,,, | Performed by: DERMATOLOGY

## 2021-03-09 PROCEDURE — 11103 PR TANGENTIAL BIOPSY, SKIN, EA ADDTL LESION: ICD-10-PCS | Mod: S$GLB,,, | Performed by: DERMATOLOGY

## 2021-03-09 PROCEDURE — 88305 TISSUE EXAM BY PATHOLOGIST: ICD-10-PCS | Mod: 26,,, | Performed by: DERMATOLOGY

## 2021-03-09 PROCEDURE — 1159F PR MEDICATION LIST DOCUMENTED IN MEDICAL RECORD: ICD-10-PCS | Mod: S$GLB,,, | Performed by: DERMATOLOGY

## 2021-03-09 PROCEDURE — 88342 IMHCHEM/IMCYTCHM 1ST ANTB: CPT | Mod: 59 | Performed by: DERMATOLOGY

## 2021-03-09 PROCEDURE — 11102 TANGNTL BX SKIN SINGLE LES: CPT | Mod: S$GLB,,, | Performed by: DERMATOLOGY

## 2021-03-09 PROCEDURE — 1126F AMNT PAIN NOTED NONE PRSNT: CPT | Mod: S$GLB,,, | Performed by: DERMATOLOGY

## 2021-03-09 PROCEDURE — 88342 IMHCHEM/IMCYTCHM 1ST ANTB: CPT | Mod: 26,,, | Performed by: DERMATOLOGY

## 2021-03-09 PROCEDURE — 1126F PR PAIN SEVERITY QUANTIFIED, NO PAIN PRESENT: ICD-10-PCS | Mod: S$GLB,,, | Performed by: DERMATOLOGY

## 2021-03-09 PROCEDURE — 88305 TISSUE EXAM BY PATHOLOGIST: CPT | Performed by: DERMATOLOGY

## 2021-03-09 PROCEDURE — 88305 TISSUE EXAM BY PATHOLOGIST: CPT | Mod: 26,,, | Performed by: DERMATOLOGY

## 2021-03-09 PROCEDURE — 88342 CHG IMMUNOCYTOCHEMISTRY: ICD-10-PCS | Mod: 26,,, | Performed by: DERMATOLOGY

## 2021-03-09 PROCEDURE — 11102 PR TANGENTIAL BIOPSY, SKIN, SINGLE LESION: ICD-10-PCS | Mod: S$GLB,,, | Performed by: DERMATOLOGY

## 2021-03-18 LAB
FINAL PATHOLOGIC DIAGNOSIS: NORMAL
GROSS: NORMAL
MICROSCOPIC EXAM: NORMAL

## 2021-03-22 ENCOUNTER — TELEPHONE (OUTPATIENT)
Dept: DERMATOLOGY | Facility: CLINIC | Age: 66
End: 2021-03-22

## 2021-04-06 ENCOUNTER — PROCEDURE VISIT (OUTPATIENT)
Dept: DERMATOLOGY | Facility: CLINIC | Age: 66
End: 2021-04-06
Payer: COMMERCIAL

## 2021-04-06 ENCOUNTER — TELEPHONE (OUTPATIENT)
Dept: DERMATOLOGY | Facility: CLINIC | Age: 66
End: 2021-04-06

## 2021-04-06 VITALS — WEIGHT: 141 LBS | BODY MASS INDEX: 26.62 KG/M2 | HEIGHT: 61 IN

## 2021-04-06 DIAGNOSIS — C44.529 SQUAMOUS CELL CARCINOMA OF SKIN OF CHEST: Primary | ICD-10-CM

## 2021-04-06 PROCEDURE — 99213 PR OFFICE/OUTPT VISIT, EST, LEVL III, 20-29 MIN: ICD-10-PCS | Mod: S$GLB,,, | Performed by: DERMATOLOGY

## 2021-04-06 PROCEDURE — 99213 OFFICE O/P EST LOW 20 MIN: CPT | Mod: S$GLB,,, | Performed by: DERMATOLOGY

## 2021-04-06 RX ORDER — FLUOROURACIL 50 MG/G
CREAM TOPICAL
Qty: 40 G | Refills: 0 | Status: SHIPPED | OUTPATIENT
Start: 2021-04-06 | End: 2021-12-27

## 2021-04-16 ENCOUNTER — TELEPHONE (OUTPATIENT)
Dept: DERMATOLOGY | Facility: CLINIC | Age: 66
End: 2021-04-16

## 2021-06-25 ENCOUNTER — TELEPHONE (OUTPATIENT)
Dept: FAMILY MEDICINE | Facility: CLINIC | Age: 66
End: 2021-06-25

## 2021-06-28 ENCOUNTER — PATIENT MESSAGE (OUTPATIENT)
Dept: FAMILY MEDICINE | Facility: CLINIC | Age: 66
End: 2021-06-28

## 2021-08-27 ENCOUNTER — OFFICE VISIT (OUTPATIENT)
Dept: FAMILY MEDICINE | Facility: CLINIC | Age: 66
End: 2021-08-27
Payer: COMMERCIAL

## 2021-08-27 VITALS
OXYGEN SATURATION: 99 % | HEART RATE: 82 BPM | DIASTOLIC BLOOD PRESSURE: 90 MMHG | SYSTOLIC BLOOD PRESSURE: 130 MMHG | BODY MASS INDEX: 26.3 KG/M2 | RESPIRATION RATE: 16 BRPM | WEIGHT: 139.31 LBS | HEIGHT: 61 IN | TEMPERATURE: 98 F

## 2021-08-27 DIAGNOSIS — Z12.39 ENCOUNTER FOR SCREENING FOR MALIGNANT NEOPLASM OF BREAST, UNSPECIFIED SCREENING MODALITY: ICD-10-CM

## 2021-08-27 DIAGNOSIS — I10 HYPERTENSION, UNSPECIFIED TYPE: ICD-10-CM

## 2021-08-27 DIAGNOSIS — R91.1 SOLITARY PULMONARY NODULE: ICD-10-CM

## 2021-08-27 DIAGNOSIS — Z00.00 ANNUAL PHYSICAL EXAM: Primary | ICD-10-CM

## 2021-08-27 PROCEDURE — 1159F MED LIST DOCD IN RCRD: CPT | Mod: CPTII,S$GLB,, | Performed by: FAMILY MEDICINE

## 2021-08-27 PROCEDURE — 1101F PR PT FALLS ASSESS DOC 0-1 FALLS W/OUT INJ PAST YR: ICD-10-PCS | Mod: CPTII,S$GLB,, | Performed by: FAMILY MEDICINE

## 2021-08-27 PROCEDURE — 3080F DIAST BP >= 90 MM HG: CPT | Mod: CPTII,S$GLB,, | Performed by: FAMILY MEDICINE

## 2021-08-27 PROCEDURE — 1101F PT FALLS ASSESS-DOCD LE1/YR: CPT | Mod: CPTII,S$GLB,, | Performed by: FAMILY MEDICINE

## 2021-08-27 PROCEDURE — 99397 PR PREVENTIVE VISIT,EST,65 & OVER: ICD-10-PCS | Mod: 25,S$GLB,, | Performed by: FAMILY MEDICINE

## 2021-08-27 PROCEDURE — 90715 TDAP VACCINE 7 YRS/> IM: CPT | Mod: S$GLB,,, | Performed by: FAMILY MEDICINE

## 2021-08-27 PROCEDURE — 1159F PR MEDICATION LIST DOCUMENTED IN MEDICAL RECORD: ICD-10-PCS | Mod: CPTII,S$GLB,, | Performed by: FAMILY MEDICINE

## 2021-08-27 PROCEDURE — 3008F BODY MASS INDEX DOCD: CPT | Mod: CPTII,S$GLB,, | Performed by: FAMILY MEDICINE

## 2021-08-27 PROCEDURE — 3008F PR BODY MASS INDEX (BMI) DOCUMENTED: ICD-10-PCS | Mod: CPTII,S$GLB,, | Performed by: FAMILY MEDICINE

## 2021-08-27 PROCEDURE — 3075F PR MOST RECENT SYSTOLIC BLOOD PRESS GE 130-139MM HG: ICD-10-PCS | Mod: CPTII,S$GLB,, | Performed by: FAMILY MEDICINE

## 2021-08-27 PROCEDURE — 1160F RVW MEDS BY RX/DR IN RCRD: CPT | Mod: CPTII,S$GLB,, | Performed by: FAMILY MEDICINE

## 2021-08-27 PROCEDURE — 1160F PR REVIEW ALL MEDS BY PRESCRIBER/CLIN PHARMACIST DOCUMENTED: ICD-10-PCS | Mod: CPTII,S$GLB,, | Performed by: FAMILY MEDICINE

## 2021-08-27 PROCEDURE — 3288F FALL RISK ASSESSMENT DOCD: CPT | Mod: CPTII,S$GLB,, | Performed by: FAMILY MEDICINE

## 2021-08-27 PROCEDURE — 1126F PR PAIN SEVERITY QUANTIFIED, NO PAIN PRESENT: ICD-10-PCS | Mod: CPTII,S$GLB,, | Performed by: FAMILY MEDICINE

## 2021-08-27 PROCEDURE — 3080F PR MOST RECENT DIASTOLIC BLOOD PRESSURE >= 90 MM HG: ICD-10-PCS | Mod: CPTII,S$GLB,, | Performed by: FAMILY MEDICINE

## 2021-08-27 PROCEDURE — 90471 IMMUNIZATION ADMIN: CPT | Mod: S$GLB,,, | Performed by: FAMILY MEDICINE

## 2021-08-27 PROCEDURE — 90471 TDAP VACCINE GREATER THAN OR EQUAL TO 7YO IM: ICD-10-PCS | Mod: S$GLB,,, | Performed by: FAMILY MEDICINE

## 2021-08-27 PROCEDURE — 3288F PR FALLS RISK ASSESSMENT DOCUMENTED: ICD-10-PCS | Mod: CPTII,S$GLB,, | Performed by: FAMILY MEDICINE

## 2021-08-27 PROCEDURE — 90715 TDAP VACCINE GREATER THAN OR EQUAL TO 7YO IM: ICD-10-PCS | Mod: S$GLB,,, | Performed by: FAMILY MEDICINE

## 2021-08-27 PROCEDURE — 3075F SYST BP GE 130 - 139MM HG: CPT | Mod: CPTII,S$GLB,, | Performed by: FAMILY MEDICINE

## 2021-08-27 PROCEDURE — 1126F AMNT PAIN NOTED NONE PRSNT: CPT | Mod: CPTII,S$GLB,, | Performed by: FAMILY MEDICINE

## 2021-08-27 PROCEDURE — 99397 PER PM REEVAL EST PAT 65+ YR: CPT | Mod: 25,S$GLB,, | Performed by: FAMILY MEDICINE

## 2021-08-27 RX ORDER — AMLODIPINE BESYLATE 5 MG/1
5 TABLET ORAL DAILY
Qty: 90 TABLET | Refills: 1 | Status: CANCELLED | OUTPATIENT
Start: 2021-08-27

## 2021-08-27 RX ORDER — OMEPRAZOLE 40 MG/1
CAPSULE, DELAYED RELEASE ORAL
Qty: 90 CAPSULE | Refills: 3 | Status: SHIPPED | OUTPATIENT
Start: 2021-08-27 | End: 2022-10-03 | Stop reason: SDUPTHER

## 2021-08-27 RX ORDER — AMLODIPINE BESYLATE 10 MG/1
10 TABLET ORAL DAILY
Qty: 90 TABLET | Refills: 1 | Status: SHIPPED | OUTPATIENT
Start: 2021-08-27 | End: 2021-12-27 | Stop reason: SDUPTHER

## 2021-09-02 ENCOUNTER — TELEPHONE (OUTPATIENT)
Dept: GASTROENTEROLOGY | Facility: CLINIC | Age: 66
End: 2021-09-02

## 2021-09-03 ENCOUNTER — HOSPITAL ENCOUNTER (OUTPATIENT)
Dept: RADIOLOGY | Facility: HOSPITAL | Age: 66
Discharge: HOME | End: 2021-09-03
Attending: FAMILY MEDICINE
Payer: COMMERCIAL

## 2021-09-03 DIAGNOSIS — R91.1 SOLITARY PULMONARY NODULE: ICD-10-CM

## 2021-09-03 PROCEDURE — 71250 CT THORAX DX C-: CPT | Mod: 26,,, | Performed by: RADIOLOGY

## 2021-09-03 PROCEDURE — 71250 CT CHEST WITHOUT CONTRAST: ICD-10-PCS | Mod: 26,,, | Performed by: RADIOLOGY

## 2021-09-03 PROCEDURE — 71250 CT THORAX DX C-: CPT | Mod: TC,PO

## 2021-09-07 ENCOUNTER — TELEPHONE (OUTPATIENT)
Dept: FAMILY MEDICINE | Facility: CLINIC | Age: 66
End: 2021-09-07

## 2021-09-08 ENCOUNTER — TELEPHONE (OUTPATIENT)
Dept: GASTROENTEROLOGY | Facility: CLINIC | Age: 66
End: 2021-09-08

## 2021-09-08 DIAGNOSIS — Z01.818 PRE-OP TESTING: Primary | ICD-10-CM

## 2021-09-11 ENCOUNTER — PATIENT MESSAGE (OUTPATIENT)
Dept: FAMILY MEDICINE | Facility: CLINIC | Age: 66
End: 2021-09-11

## 2021-09-15 ENCOUNTER — CLINICAL SUPPORT (OUTPATIENT)
Dept: FAMILY MEDICINE | Facility: CLINIC | Age: 66
End: 2021-09-15

## 2021-09-15 ENCOUNTER — TELEPHONE (OUTPATIENT)
Dept: FAMILY MEDICINE | Facility: CLINIC | Age: 66
End: 2021-09-15

## 2021-09-15 VITALS — HEART RATE: 86 BPM | OXYGEN SATURATION: 99 % | SYSTOLIC BLOOD PRESSURE: 122 MMHG | DIASTOLIC BLOOD PRESSURE: 82 MMHG

## 2021-09-15 DIAGNOSIS — I10 HYPERTENSION, ESSENTIAL: Primary | ICD-10-CM

## 2021-09-16 ENCOUNTER — TELEPHONE (OUTPATIENT)
Dept: GASTROENTEROLOGY | Facility: CLINIC | Age: 66
End: 2021-09-16

## 2021-09-16 ENCOUNTER — PATIENT MESSAGE (OUTPATIENT)
Dept: GASTROENTEROLOGY | Facility: CLINIC | Age: 66
End: 2021-09-16

## 2021-09-17 RX ORDER — ROSUVASTATIN CALCIUM 10 MG/1
10 TABLET, COATED ORAL DAILY
Qty: 90 TABLET | Refills: 0 | Status: SHIPPED | OUTPATIENT
Start: 2021-09-17 | End: 2021-11-29

## 2021-09-23 ENCOUNTER — PATIENT MESSAGE (OUTPATIENT)
Dept: FAMILY MEDICINE | Facility: CLINIC | Age: 66
End: 2021-09-23

## 2021-11-03 ENCOUNTER — PATIENT MESSAGE (OUTPATIENT)
Dept: GASTROENTEROLOGY | Facility: CLINIC | Age: 66
End: 2021-11-03
Payer: COMMERCIAL

## 2021-11-03 ENCOUNTER — TELEPHONE (OUTPATIENT)
Dept: GASTROENTEROLOGY | Facility: CLINIC | Age: 66
End: 2021-11-03
Payer: COMMERCIAL

## 2021-11-04 ENCOUNTER — TELEPHONE (OUTPATIENT)
Dept: GASTROENTEROLOGY | Facility: CLINIC | Age: 66
End: 2021-11-04
Payer: COMMERCIAL

## 2021-12-10 ENCOUNTER — LAB VISIT (OUTPATIENT)
Dept: LAB | Facility: HOSPITAL | Age: 66
End: 2021-12-10
Attending: FAMILY MEDICINE
Payer: COMMERCIAL

## 2021-12-10 DIAGNOSIS — I10 HYPERTENSION, ESSENTIAL: ICD-10-CM

## 2021-12-10 LAB
ALBUMIN SERPL BCP-MCNC: 4 G/DL (ref 3.5–5.2)
ALP SERPL-CCNC: 59 U/L (ref 55–135)
ALT SERPL W/O P-5'-P-CCNC: 17 U/L (ref 10–44)
ANION GAP SERPL CALC-SCNC: 12 MMOL/L (ref 8–16)
AST SERPL-CCNC: 18 U/L (ref 10–40)
BASOPHILS # BLD AUTO: 0.05 K/UL (ref 0–0.2)
BASOPHILS NFR BLD: 1 % (ref 0–1.9)
BILIRUB SERPL-MCNC: 0.6 MG/DL (ref 0.1–1)
BUN SERPL-MCNC: 11 MG/DL (ref 8–23)
CALCIUM SERPL-MCNC: 9.4 MG/DL (ref 8.7–10.5)
CHLORIDE SERPL-SCNC: 106 MMOL/L (ref 95–110)
CHOLEST SERPL-MCNC: 129 MG/DL (ref 120–199)
CHOLEST/HDLC SERPL: 2.4 {RATIO} (ref 2–5)
CO2 SERPL-SCNC: 22 MMOL/L (ref 23–29)
CREAT SERPL-MCNC: 0.8 MG/DL (ref 0.5–1.4)
DIFFERENTIAL METHOD: ABNORMAL
EOSINOPHIL # BLD AUTO: 0.1 K/UL (ref 0–0.5)
EOSINOPHIL NFR BLD: 2 % (ref 0–8)
ERYTHROCYTE [DISTWIDTH] IN BLOOD BY AUTOMATED COUNT: 13.9 % (ref 11.5–14.5)
EST. GFR  (AFRICAN AMERICAN): >60 ML/MIN/1.73 M^2
EST. GFR  (NON AFRICAN AMERICAN): >60 ML/MIN/1.73 M^2
ESTIMATED AVG GLUCOSE: 105 MG/DL (ref 68–131)
GLUCOSE SERPL-MCNC: 108 MG/DL (ref 70–110)
HBA1C MFR BLD: 5.3 % (ref 4–5.6)
HCT VFR BLD AUTO: 40.9 % (ref 37–48.5)
HDLC SERPL-MCNC: 53 MG/DL (ref 40–75)
HDLC SERPL: 41.1 % (ref 20–50)
HGB BLD-MCNC: 13.4 G/DL (ref 12–16)
IMM GRANULOCYTES # BLD AUTO: 0.01 K/UL (ref 0–0.04)
IMM GRANULOCYTES NFR BLD AUTO: 0.2 % (ref 0–0.5)
LDLC SERPL CALC-MCNC: 60.8 MG/DL (ref 63–159)
LYMPHOCYTES # BLD AUTO: 2.1 K/UL (ref 1–4.8)
LYMPHOCYTES NFR BLD: 41 % (ref 18–48)
MCH RBC QN AUTO: 31.2 PG (ref 27–31)
MCHC RBC AUTO-ENTMCNC: 32.8 G/DL (ref 32–36)
MCV RBC AUTO: 95 FL (ref 82–98)
MONOCYTES # BLD AUTO: 0.5 K/UL (ref 0.3–1)
MONOCYTES NFR BLD: 10.2 % (ref 4–15)
NEUTROPHILS # BLD AUTO: 2.3 K/UL (ref 1.8–7.7)
NEUTROPHILS NFR BLD: 45.6 % (ref 38–73)
NONHDLC SERPL-MCNC: 76 MG/DL
NRBC BLD-RTO: 0 /100 WBC
PLATELET # BLD AUTO: 279 K/UL (ref 150–450)
PMV BLD AUTO: 11.4 FL (ref 9.2–12.9)
POTASSIUM SERPL-SCNC: 3.9 MMOL/L (ref 3.5–5.1)
PROT SERPL-MCNC: 7 G/DL (ref 6–8.4)
RBC # BLD AUTO: 4.3 M/UL (ref 4–5.4)
SODIUM SERPL-SCNC: 140 MMOL/L (ref 136–145)
TRIGL SERPL-MCNC: 76 MG/DL (ref 30–150)
TSH SERPL DL<=0.005 MIU/L-ACNC: 0.83 UIU/ML (ref 0.4–4)
WBC # BLD AUTO: 5.02 K/UL (ref 3.9–12.7)

## 2021-12-10 PROCEDURE — 80053 COMPREHEN METABOLIC PANEL: CPT | Performed by: FAMILY MEDICINE

## 2021-12-10 PROCEDURE — 85025 COMPLETE CBC W/AUTO DIFF WBC: CPT | Performed by: FAMILY MEDICINE

## 2021-12-10 PROCEDURE — 36415 COLL VENOUS BLD VENIPUNCTURE: CPT | Mod: PO | Performed by: FAMILY MEDICINE

## 2021-12-10 PROCEDURE — 83036 HEMOGLOBIN GLYCOSYLATED A1C: CPT | Performed by: FAMILY MEDICINE

## 2021-12-10 PROCEDURE — 84443 ASSAY THYROID STIM HORMONE: CPT | Performed by: FAMILY MEDICINE

## 2021-12-10 PROCEDURE — 80061 LIPID PANEL: CPT | Performed by: FAMILY MEDICINE

## 2021-12-11 ENCOUNTER — PATIENT MESSAGE (OUTPATIENT)
Dept: FAMILY MEDICINE | Facility: CLINIC | Age: 66
End: 2021-12-11
Payer: COMMERCIAL

## 2021-12-27 ENCOUNTER — OFFICE VISIT (OUTPATIENT)
Dept: FAMILY MEDICINE | Facility: CLINIC | Age: 66
End: 2021-12-27
Payer: COMMERCIAL

## 2021-12-27 VITALS
HEART RATE: 81 BPM | DIASTOLIC BLOOD PRESSURE: 86 MMHG | OXYGEN SATURATION: 100 % | TEMPERATURE: 98 F | RESPIRATION RATE: 20 BRPM | SYSTOLIC BLOOD PRESSURE: 134 MMHG | WEIGHT: 143.5 LBS | HEIGHT: 61 IN | BODY MASS INDEX: 27.09 KG/M2

## 2021-12-27 DIAGNOSIS — E78.5 HYPERLIPIDEMIA, UNSPECIFIED HYPERLIPIDEMIA TYPE: ICD-10-CM

## 2021-12-27 DIAGNOSIS — I10 HYPERTENSION, ESSENTIAL: Primary | ICD-10-CM

## 2021-12-27 PROCEDURE — 90471 IMMUNIZATION ADMIN: CPT | Mod: S$GLB,,, | Performed by: FAMILY MEDICINE

## 2021-12-27 PROCEDURE — 3288F PR FALLS RISK ASSESSMENT DOCUMENTED: ICD-10-PCS | Mod: CPTII,S$GLB,, | Performed by: FAMILY MEDICINE

## 2021-12-27 PROCEDURE — 3075F PR MOST RECENT SYSTOLIC BLOOD PRESS GE 130-139MM HG: ICD-10-PCS | Mod: CPTII,S$GLB,, | Performed by: FAMILY MEDICINE

## 2021-12-27 PROCEDURE — 1126F AMNT PAIN NOTED NONE PRSNT: CPT | Mod: CPTII,S$GLB,, | Performed by: FAMILY MEDICINE

## 2021-12-27 PROCEDURE — 1160F PR REVIEW ALL MEDS BY PRESCRIBER/CLIN PHARMACIST DOCUMENTED: ICD-10-PCS | Mod: CPTII,S$GLB,, | Performed by: FAMILY MEDICINE

## 2021-12-27 PROCEDURE — 99213 OFFICE O/P EST LOW 20 MIN: CPT | Mod: 25,S$GLB,, | Performed by: FAMILY MEDICINE

## 2021-12-27 PROCEDURE — 1160F RVW MEDS BY RX/DR IN RCRD: CPT | Mod: CPTII,S$GLB,, | Performed by: FAMILY MEDICINE

## 2021-12-27 PROCEDURE — 90694 VACC AIIV4 NO PRSRV 0.5ML IM: CPT | Mod: S$GLB,,, | Performed by: FAMILY MEDICINE

## 2021-12-27 PROCEDURE — 3075F SYST BP GE 130 - 139MM HG: CPT | Mod: CPTII,S$GLB,, | Performed by: FAMILY MEDICINE

## 2021-12-27 PROCEDURE — 3008F PR BODY MASS INDEX (BMI) DOCUMENTED: ICD-10-PCS | Mod: CPTII,S$GLB,, | Performed by: FAMILY MEDICINE

## 2021-12-27 PROCEDURE — 3044F PR MOST RECENT HEMOGLOBIN A1C LEVEL <7.0%: ICD-10-PCS | Mod: CPTII,S$GLB,, | Performed by: FAMILY MEDICINE

## 2021-12-27 PROCEDURE — 3079F DIAST BP 80-89 MM HG: CPT | Mod: CPTII,S$GLB,, | Performed by: FAMILY MEDICINE

## 2021-12-27 PROCEDURE — 1101F PT FALLS ASSESS-DOCD LE1/YR: CPT | Mod: CPTII,S$GLB,, | Performed by: FAMILY MEDICINE

## 2021-12-27 PROCEDURE — 90471 FLU VACCINE - QUADRIVALENT - ADJUVANTED: ICD-10-PCS | Mod: S$GLB,,, | Performed by: FAMILY MEDICINE

## 2021-12-27 PROCEDURE — 99213 PR OFFICE/OUTPT VISIT, EST, LEVL III, 20-29 MIN: ICD-10-PCS | Mod: 25,S$GLB,, | Performed by: FAMILY MEDICINE

## 2021-12-27 PROCEDURE — 1159F MED LIST DOCD IN RCRD: CPT | Mod: CPTII,S$GLB,, | Performed by: FAMILY MEDICINE

## 2021-12-27 PROCEDURE — 1159F PR MEDICATION LIST DOCUMENTED IN MEDICAL RECORD: ICD-10-PCS | Mod: CPTII,S$GLB,, | Performed by: FAMILY MEDICINE

## 2021-12-27 PROCEDURE — 1101F PR PT FALLS ASSESS DOC 0-1 FALLS W/OUT INJ PAST YR: ICD-10-PCS | Mod: CPTII,S$GLB,, | Performed by: FAMILY MEDICINE

## 2021-12-27 PROCEDURE — 90694 FLU VACCINE - QUADRIVALENT - ADJUVANTED: ICD-10-PCS | Mod: S$GLB,,, | Performed by: FAMILY MEDICINE

## 2021-12-27 PROCEDURE — 3008F BODY MASS INDEX DOCD: CPT | Mod: CPTII,S$GLB,, | Performed by: FAMILY MEDICINE

## 2021-12-27 PROCEDURE — 3044F HG A1C LEVEL LT 7.0%: CPT | Mod: CPTII,S$GLB,, | Performed by: FAMILY MEDICINE

## 2021-12-27 PROCEDURE — 3079F PR MOST RECENT DIASTOLIC BLOOD PRESSURE 80-89 MM HG: ICD-10-PCS | Mod: CPTII,S$GLB,, | Performed by: FAMILY MEDICINE

## 2021-12-27 PROCEDURE — 1126F PR PAIN SEVERITY QUANTIFIED, NO PAIN PRESENT: ICD-10-PCS | Mod: CPTII,S$GLB,, | Performed by: FAMILY MEDICINE

## 2021-12-27 PROCEDURE — 3288F FALL RISK ASSESSMENT DOCD: CPT | Mod: CPTII,S$GLB,, | Performed by: FAMILY MEDICINE

## 2021-12-27 RX ORDER — LOSARTAN POTASSIUM AND HYDROCHLOROTHIAZIDE 25; 100 MG/1; MG/1
1 TABLET ORAL DAILY
Qty: 90 TABLET | Refills: 3 | Status: SHIPPED | OUTPATIENT
Start: 2021-12-27 | End: 2022-12-09 | Stop reason: SDUPTHER

## 2021-12-27 RX ORDER — AMLODIPINE BESYLATE 10 MG/1
10 TABLET ORAL DAILY
Qty: 90 TABLET | Refills: 1 | Status: SHIPPED | OUTPATIENT
Start: 2021-12-27 | End: 2022-10-03 | Stop reason: SDUPTHER

## 2021-12-27 RX ORDER — ROSUVASTATIN CALCIUM 10 MG/1
10 TABLET, COATED ORAL DAILY
Qty: 90 TABLET | Refills: 3 | Status: SHIPPED | OUTPATIENT
Start: 2021-12-27 | End: 2023-01-09

## 2022-01-04 RX ORDER — VALACYCLOVIR HYDROCHLORIDE 500 MG/1
500 TABLET, FILM COATED ORAL DAILY
Qty: 90 TABLET | Refills: 3 | Status: SHIPPED | OUTPATIENT
Start: 2022-01-04 | End: 2022-01-04 | Stop reason: SDUPTHER

## 2022-01-04 RX ORDER — VALACYCLOVIR HYDROCHLORIDE 500 MG/1
500 TABLET, FILM COATED ORAL DAILY
Qty: 14 TABLET | Refills: 0 | Status: SHIPPED | OUTPATIENT
Start: 2022-01-04 | End: 2022-02-21 | Stop reason: SDUPTHER

## 2022-01-04 NOTE — TELEPHONE ENCOUNTER
----- Message from Hayley Pradhan sent at 1/4/2022  7:38 AM CST -----  Type:  RX Refill Request    Who Called:  Patient  RX Name and Strength:  all medications  Preferred Pharmacy with phone number:  requesting a two week supply of Valtrex be ordered locally at [facility] on [address] and the rest of medications ordered through [facility]  Best Call Back Number:  [phone] or [phone]  Additional Information:

## 2022-01-04 NOTE — TELEPHONE ENCOUNTER
No new care gaps identified.  Powered by [employer]. Reference number: [id number].   1/04/2022 9:34:41 AM CST

## 2022-01-04 NOTE — TELEPHONE ENCOUNTER
Pt is requesting a 2 week supply of Valtrex to be sent to her local pharmacy and a 90 day supply of Valtrex to the mail order pharmacy. Is this something we can do?

## 2022-01-04 NOTE — TELEPHONE ENCOUNTER
Can you please send in a 2 week supply to [facility] for the Valtrex. Pt is almost completely out of medication and will not get mail order rx for atleast a week.

## 2022-01-04 NOTE — TELEPHONE ENCOUNTER
----- Message from Jcarlos Fowler sent at 1/4/2022 10:44 AM CST -----  Contact: pt at  [phone]  Type: Needs Medical Advice  Who Called: pt  Pharmacy name and phone #:    [id number] - [place], [place] - [address] [address] AT [facility] [address]  [address] [address] [address]  Phone: [phone] Fax: [phone]  [facility] Call Back Number: [phone]  Additional Information: pt is calling the office to have a 2 week supply of this medication, valACYclovir (VALTREX) 500 MG tablet sent to her local pharmacy.  The Rx was sent to her mail order pharmacy and will not arrive for awhile and the pt is completely out and needs to continue taking the medication. Please call back and advise.

## 2022-01-04 NOTE — TELEPHONE ENCOUNTER
No new care gaps identified.  Powered by [employer]. Reference number: [id number].   1/04/2022 1:49:39 PM CST

## 2022-01-12 ENCOUNTER — TELEPHONE (OUTPATIENT)
Dept: FAMILY MEDICINE | Facility: CLINIC | Age: 67
End: 2022-01-12
Payer: COMMERCIAL

## 2022-01-12 DIAGNOSIS — R93.89 ABNORMAL MRI: Primary | ICD-10-CM

## 2022-01-12 DIAGNOSIS — R92.8 ABNORMAL MAMMOGRAM: ICD-10-CM

## 2022-01-12 NOTE — TELEPHONE ENCOUNTER
----- Message from Nafisa Martinez sent at 1/12/2022  2:12 PM CST -----  Contact: pt  Type: Needs Medical Advice    Who: Called: pt     Best Call Back Number: [phone]    Inquiry/Question: Pt received a letter stating she needs additional imaging from her mammo and they told her that the Dr needs to put in the orders please call to advise  Thank you~

## 2022-01-14 NOTE — TELEPHONE ENCOUNTER
According to the patient's schedule the screening mammogram was done at [facility] on 1/7/22. The diagnostic mammogram and ultrasound have also been scheduled at [facility] on 1/28/22.    Ana

## 2022-01-18 ENCOUNTER — LAB VISIT (OUTPATIENT)
Dept: FAMILY MEDICINE | Facility: CLINIC | Age: 67
End: 2022-01-18
Payer: COMMERCIAL

## 2022-01-18 DIAGNOSIS — Z01.818 PRE-OP TESTING: ICD-10-CM

## 2022-01-18 PROCEDURE — U0005 INFEC AGEN DETEC AMPLI PROBE: HCPCS | Performed by: INTERNAL MEDICINE

## 2022-01-18 PROCEDURE — U0003 INFECTIOUS AGENT DETECTION BY NUCLEIC ACID (DNA OR RNA); SEVERE ACUTE RESPIRATORY SYNDROME CORONAVIRUS 2 (SARS-COV-2) (CORONAVIRUS DISEASE [COVID-19]), AMPLIFIED PROBE TECHNIQUE, MAKING USE OF HIGH THROUGHPUT TECHNOLOGIES AS DESCRIBED BY CMS-2020-01-R: HCPCS | Performed by: INTERNAL MEDICINE

## 2022-01-19 ENCOUNTER — TELEPHONE (OUTPATIENT)
Dept: GASTROENTEROLOGY | Facility: CLINIC | Age: 67
End: 2022-01-19
Payer: COMMERCIAL

## 2022-01-19 ENCOUNTER — TELEPHONE (OUTPATIENT)
Dept: SURGERY | Facility: HOSPITAL | Age: 67
End: 2022-01-19
Payer: COMMERCIAL

## 2022-01-19 DIAGNOSIS — U07.1 COVID-19 VIRUS DETECTED: ICD-10-CM

## 2022-01-19 DIAGNOSIS — Z01.818 PRE-OP TESTING: Primary | ICD-10-CM

## 2022-01-19 LAB
SARS-COV-2 RNA RESP QL NAA+PROBE: DETECTED
SARS-COV-2- CYCLE NUMBER: 28

## 2022-01-19 NOTE — TELEPHONE ENCOUNTER
Returned call to the patient, patient states that she has tested positive for covid and needs to reschedule her procedure, patient requests to have her procedure in June, procedure and covid test rescheduled for the patient, patient verbalized understanding of this.

## 2022-01-19 NOTE — TELEPHONE ENCOUNTER
This patient tested positive for covid on 1/18 for pre-procedure testing. Please reach out to this patient and notify of test results and reschedule procedure for greater than 10 days after symptom free or from positive test if asymptomatic.

## 2022-01-19 NOTE — TELEPHONE ENCOUNTER
----- Message from Jules Bauman sent at 1/19/2022  9:12 AM CST -----  Type: Needs Medical Advice  Who Called:  Patient    Best Call Back Number: [phone]  Additional Information:Patient states that she would like a callback regarding her procedure on 01/20/22 since she has tested positive for Covid.

## 2022-01-28 ENCOUNTER — PATIENT MESSAGE (OUTPATIENT)
Dept: FAMILY MEDICINE | Facility: CLINIC | Age: 67
End: 2022-01-28
Payer: COMMERCIAL

## 2022-02-17 ENCOUNTER — PATIENT OUTREACH (OUTPATIENT)
Dept: ADMINISTRATIVE | Facility: OTHER | Age: 67
End: 2022-02-17
Payer: COMMERCIAL

## 2022-02-17 DIAGNOSIS — Z12.11 ENCOUNTER FOR FIT (FECAL IMMUNOCHEMICAL TEST) SCREENING: Primary | ICD-10-CM

## 2022-02-17 NOTE — PROGRESS NOTES
Chart was reviewed for overdue Proactive [facility] Encounters (GENARO)  topics  Updates were requested from care everywhere  Health Maintenance has been updated  LINKS immunization registry triggered  Fit kit ordered

## 2022-02-21 NOTE — TELEPHONE ENCOUNTER
----- Message from Cayden Chaudhari sent at 2/21/2022 12:12 PM CST -----  Regarding: Call Back  Who Called: pt         What is the reason for the call: went to put in refill for medication valACYclovir (VALTREX) 500 MG tablet and says it has been discontinued. Needs prescription for something else. Please contact to further discuss.          Can patient be contacted on [employer]: yes         Call back number: [phone]

## 2022-02-22 ENCOUNTER — OFFICE VISIT (OUTPATIENT)
Dept: DERMATOLOGY | Facility: CLINIC | Age: 67
End: 2022-02-22
Payer: COMMERCIAL

## 2022-02-22 VITALS — BODY MASS INDEX: 27 KG/M2 | HEIGHT: 61 IN | WEIGHT: 143 LBS

## 2022-02-22 DIAGNOSIS — D18.01 CHERRY ANGIOMA: ICD-10-CM

## 2022-02-22 DIAGNOSIS — L90.5 SCAR: ICD-10-CM

## 2022-02-22 DIAGNOSIS — Z85.828 HISTORY OF NONMELANOMA SKIN CANCER: ICD-10-CM

## 2022-02-22 DIAGNOSIS — D22.9 MULTIPLE BENIGN NEVI: ICD-10-CM

## 2022-02-22 DIAGNOSIS — L82.1 SEBORRHEIC KERATOSES: ICD-10-CM

## 2022-02-22 DIAGNOSIS — L57.0 ACTINIC KERATOSES: Primary | ICD-10-CM

## 2022-02-22 DIAGNOSIS — L81.4 SOLAR LENTIGO: ICD-10-CM

## 2022-02-22 PROCEDURE — 1160F RVW MEDS BY RX/DR IN RCRD: CPT | Mod: CPTII,S$GLB,, | Performed by: DERMATOLOGY

## 2022-02-22 PROCEDURE — 99213 PR OFFICE/OUTPT VISIT, EST, LEVL III, 20-29 MIN: ICD-10-PCS | Mod: 25,S$GLB,, | Performed by: DERMATOLOGY

## 2022-02-22 PROCEDURE — 3008F PR BODY MASS INDEX (BMI) DOCUMENTED: ICD-10-PCS | Mod: CPTII,S$GLB,, | Performed by: DERMATOLOGY

## 2022-02-22 PROCEDURE — 1126F PR PAIN SEVERITY QUANTIFIED, NO PAIN PRESENT: ICD-10-PCS | Mod: CPTII,S$GLB,, | Performed by: DERMATOLOGY

## 2022-02-22 PROCEDURE — 1126F AMNT PAIN NOTED NONE PRSNT: CPT | Mod: CPTII,S$GLB,, | Performed by: DERMATOLOGY

## 2022-02-22 PROCEDURE — 3288F FALL RISK ASSESSMENT DOCD: CPT | Mod: CPTII,S$GLB,, | Performed by: DERMATOLOGY

## 2022-02-22 PROCEDURE — 1160F PR REVIEW ALL MEDS BY PRESCRIBER/CLIN PHARMACIST DOCUMENTED: ICD-10-PCS | Mod: CPTII,S$GLB,, | Performed by: DERMATOLOGY

## 2022-02-22 PROCEDURE — 1159F PR MEDICATION LIST DOCUMENTED IN MEDICAL RECORD: ICD-10-PCS | Mod: CPTII,S$GLB,, | Performed by: DERMATOLOGY

## 2022-02-22 PROCEDURE — 1101F PR PT FALLS ASSESS DOC 0-1 FALLS W/OUT INJ PAST YR: ICD-10-PCS | Mod: CPTII,S$GLB,, | Performed by: DERMATOLOGY

## 2022-02-22 PROCEDURE — 3288F PR FALLS RISK ASSESSMENT DOCUMENTED: ICD-10-PCS | Mod: CPTII,S$GLB,, | Performed by: DERMATOLOGY

## 2022-02-22 PROCEDURE — 17000 PR DESTRUCTION(LASER SURGERY,CRYOSURGERY,CHEMOSURGERY),PREMALIGNANT LESIONS,FIRST LESION: ICD-10-PCS | Mod: S$GLB,,, | Performed by: DERMATOLOGY

## 2022-02-22 PROCEDURE — 99213 OFFICE O/P EST LOW 20 MIN: CPT | Mod: 25,S$GLB,, | Performed by: DERMATOLOGY

## 2022-02-22 PROCEDURE — 17000 DESTRUCT PREMALG LESION: CPT | Mod: S$GLB,,, | Performed by: DERMATOLOGY

## 2022-02-22 PROCEDURE — 99999 PR PBB SHADOW E&M-EST. PATIENT-LVL III: ICD-10-PCS | Mod: PBBFAC,,, | Performed by: DERMATOLOGY

## 2022-02-22 PROCEDURE — 3008F BODY MASS INDEX DOCD: CPT | Mod: CPTII,S$GLB,, | Performed by: DERMATOLOGY

## 2022-02-22 PROCEDURE — 99999 PR PBB SHADOW E&M-EST. PATIENT-LVL III: CPT | Mod: PBBFAC,,, | Performed by: DERMATOLOGY

## 2022-02-22 PROCEDURE — 1159F MED LIST DOCD IN RCRD: CPT | Mod: CPTII,S$GLB,, | Performed by: DERMATOLOGY

## 2022-02-22 PROCEDURE — 1101F PT FALLS ASSESS-DOCD LE1/YR: CPT | Mod: CPTII,S$GLB,, | Performed by: DERMATOLOGY

## 2022-02-22 RX ORDER — VALACYCLOVIR HYDROCHLORIDE 500 MG/1
500 TABLET, FILM COATED ORAL DAILY
Qty: 14 TABLET | Refills: 11 | Status: SHIPPED | OUTPATIENT
Start: 2022-02-22 | End: 2022-02-28 | Stop reason: SDUPTHER

## 2022-02-22 NOTE — PATIENT INSTRUCTIONS

## 2022-02-22 NOTE — PROGRESS NOTES
Subjective:       Patient ID:  Meka Ann is a 66 y.o. female who presents for   Chief Complaint   Patient presents with    Skin Check     TBSE     LOV: 3/19/21 SK, h/o NMSC, scar, nevi, lentigo, porokeratosis    Skin Check -TBSE  No area of concern    Phx of SCC on R knee about 20 yrs ago  SCC - central chest E&S   Previously under the care of Dr Jacey Yañez  Intense sun exposure in youth     +Fhx of MM; mother, [place],      Current Outpatient Medications:     amLODIPine (NORVASC) 10 MG tablet, Take 1 tablet (10 mg total) by mouth once daily., Disp: 90 tablet, Rfl: 1    calcium-vitamin D (OSCAL) 250 (625)-125 mg-unit per tablet, Take 1 tablet by mouth once daily., Disp: , Rfl:     etodolac (LODINE) 500 MG tablet, TAKE 1 TABLET(500 MG) BY MOUTH TWICE DAILY AS NEEDED, Disp: 180 tablet, Rfl: 0    FYAVOLV 1-5 mg-mcg Tab, TAKE 1 TABLET ONCE DAILY, Disp: 84 tablet, Rfl: 3    HYDROcodone-acetaminophen (NORCO) 5-325 mg per tablet, Take 1 tablet by mouth every 6 (six) hours as needed for Pain., Disp: 28 tablet, Rfl: 0    losartan-hydrochlorothiazide 100-25 mg (HYZAAR) 100-25 mg per tablet, Take 1 tablet by mouth once daily., Disp: 90 tablet, Rfl: 3    magnesium oxide-Mg AA chelate (MG-PLUS-PROTEIN) 133 mg Tab, Take 700 mg by mouth once daily., Disp: , Rfl:     multivitamin capsule, Take 1 capsule by mouth once daily., Disp: , Rfl:     omeprazole (PRILOSEC) 40 MG capsule, TAKE 1 CAPSULE(40 MG) BY MOUTH BEFORE BREAKFAST, Disp: 90 capsule, Rfl: 3    rosuvastatin (CRESTOR) 10 MG tablet, Take 1 tablet (10 mg total) by mouth once daily., Disp: 90 tablet, Rfl: 3    valACYclovir (VALTREX) 500 MG tablet, Take 1 tablet (500 mg total) by mouth once daily., Disp: 14 tablet, Rfl: 0           Review of Systems   Constitutional: Negative for fever, chills and fatigue.   Skin: Positive for daily sunscreen use and activity-related sunscreen use. Negative for itching, rash and wears hat.    Hematologic/Lymphatic: Does not bruise/bleed easily.        Objective:    Physical Exam   Constitutional: She appears well-developed and well-nourished. No distress.   Neurological: She is alert and oriented to person, place, and time. She is not disoriented.   Psychiatric: She has a normal mood and affect.   Skin:   Areas Examined (abnormalities noted in diagram):   Scalp / Hair Palpated and Inspected  Head / Face Inspection Performed  Neck Inspection Performed  Chest / Axilla Inspection Performed  Abdomen Inspection Performed  Genitals / Buttocks / Groin Inspection Performed  Back Inspection Performed  RUE Inspected  LUE Inspection Performed  RLE Inspected  LLE Inspection Performed  Nails and Digits Inspection Performed                           Diagram Legend     Erythematous scaling macule/papule c/w actinic keratosis       Vascular papule c/w angioma      Pigmented verrucoid papule/plaque c/w seborrheic keratosis      Yellow umbilicated papule c/w sebaceous hyperplasia      Irregularly shaped tan macule c/w lentigo     1-2 mm smooth white papules consistent with Milia      Movable subcutaneous cyst with punctum c/w epidermal inclusion cyst      Subcutaneous movable cyst c/w pilar cyst      Firm pink to brown papule c/w dermatofibroma      Pedunculated fleshy papule(s) c/w skin tag(s)      Evenly pigmented macule c/w junctional nevus     Mildly variegated pigmented, slightly irregular-bordered macule c/w mildly atypical nevus      Flesh colored to evenly pigmented papule c/w intradermal nevus       Pink pearly papule/plaque c/w basal cell carcinoma      Erythematous hyperkeratotic cursted plaque c/w SCC      Surgical scar with no sign of skin cancer recurrence      Open and closed comedones      Inflammatory papules and pustules      Verrucoid papule consistent consistent with wart     Erythematous eczematous patches and plaques     Dystrophic onycholytic nail with subungual debris c/w onychomycosis      Umbilicated papule    Erythematous-base heme-crusted tan verrucoid plaque consistent with inflamed seborrheic keratosis     Erythematous Silvery Scaling Plaque c/w Psoriasis     See annotation      Assessment / Plan:        Actinic keratoses  Cryosurgery Procedure Note    Verbal consent from the patient is obtained and the patient is aware of the precancerous quality and need for treatment of these lesions. Liquid nitrogen cryosurgery is applied to the 1 actinic keratoses, as detailed in the physical exam, to produce a freeze injury. The patient is aware that blisters may form and is instructed on wound care with gentle cleansing and use of vaseline ointment to keep moist until healed. The patient is supplied a handout on cryosurgery and is instructed to call if lesions do not completely resolve.    History of nonmelanoma skin cancer  Scar  Area of previous SCCs x 2 examined. Sites well healed with no signs of recurrence.    Total body skin examination performed today including at least 12 points as noted in physical examination. No lesions suspicious for malignancy noted.    Seborrheic keratoses  These are benign inherited growths without a malignant potential. Reassurance given to patient. No treatment is necessary.     Solar lentigo  This is a benign hyperpigmented sun induced lesion. Daily sun protection will reduce the number of new lesions. Treatment of these benign lesions are considered cosmetic.    Cherry angioma  This is a benign vascular lesion. Reassurance given. No treatment required.     Multiple benign nevi  Careful dermoscopy evaluation of nevi performed with none identified as needing biopsy today  Monitor for new mole or moles that are becoming bigger, darker, irritated, or developing irregular borders.     Patient instructed in importance in daily broad spectrum sun protection of at least spf 30. Mineral sunscreen ingredients preferred (Zinc +/- Titanium) and can be found OTC.   Recommend Elta MD  for daily use on face and neck.  Patient encouraged to wear hat for all outdoor exposure.   Also discussed sun avoidance and use of protective clothing.             Follow up in about 1 year (around 2/22/2023), or if symptoms worsen or fail to improve.

## 2022-02-23 RX ORDER — NORETHINDRONE ACETATE AND ETHINYL ESTRADIOL .005; 1 MG/1; MG/1
TABLET, FILM COATED ORAL
Qty: 84 TABLET | Refills: 3 | Status: SHIPPED | OUTPATIENT
Start: 2022-02-23 | End: 2023-06-13 | Stop reason: SDUPTHER

## 2022-02-23 NOTE — TELEPHONE ENCOUNTER
Refill Routing Note   Medication(s) are not appropriate for processing by [facility] for the following reason(s):      - Drug-Disease Interaction (FYAVOLV and Hypertension)    [facility] action(s):  Defer  Approve Medication-related problems identified: Drug-disease interaction        --->Care Gap information included in message below if applicable.   Medication reconciliation completed: No   Automatic Epic Generated Protocol Data:    Orders Placed This Encounter    valACYclovir (VALTREX) 500 MG tablet      Requested Prescriptions   Pending Prescriptions Disp Refills    FYAVOLV 1-5 mg-mcg Tab [Pharmacy Med Name: FYAVOLV(28)  TAB 1-5] 84 tablet 3     Sig: TAKE 1 TABLET ONCE DAILY       OB/GYN:  Contraceptives Passed - 2/21/2022  3:45 PM        Passed - Patient is at least 18 years old        Passed - No positive pregnancy tests documented in last 360 days        Passed - Patient is not an active smoker        Passed - No contraindicated diagnoses on problem list        Passed - Mammography is up-to-date per Health Maintenance        Passed - Last BP in normal range within 360 days     BP Readings from Last 1 Encounters:   12/27/21 134/86               Passed - Valid encounter within last 15 months     Recent Visits  Date Type Provider Dept   12/27/21 Office Visit Corry Katz MD [facility]   08/27/21 Office Visit Corry Katz MD [facility]   10/16/20 Office Visit Corry Katz MD [facility]   Showing recent visits within past 720 days and meeting all other requirements  Future Appointments  No visits were found meeting these conditions.  Showing future appointments within next 150 days and meeting all other requirements      Future Appointments              In 3 months PROCEDURAL COVID TESTING, [facility] [place]                 Signed Prescriptions Disp Refills    valACYclovir (VALTREX) 500 MG tablet 14 tablet 11     Sig: Take 1 tablet  (500 mg total) by mouth once daily.       Antimicrobials:  Oral Antiviral Agents - Anti-Herpetic Passed - 2/21/2022  3:45 PM        Passed - Patient is at least 18 years old        Passed - Valid encounter within last 15 months     Recent Visits  Date Type Provider Dept   12/27/21 Office Visit Corry Katz MD [facility]   08/27/21 Office Visit Corry Katz MD [facility]   10/16/20 Office Visit Corry Katz MD [facility]   Showing recent visits within past 720 days and meeting all other requirements  Future Appointments  No visits were found meeting these conditions.  Showing future appointments within next 150 days and meeting all other requirements      Future Appointments              In 3 months PROCEDURAL COVID TESTING, [facility] [place]                Passed - Cr is 1.39 or below and within 360 days     Lab Results   Component Value Date    CREATININE 0.8 12/10/2021    CREATININE 0.8 10/02/2020    CREATININE 0.8 10/09/2019              Passed - WBC in normal range and within 360 days     WBC   Date Value Ref Range Status   12/10/2021 5.02 3.90 - 12.70 K/uL Final   10/02/2020 6.25 3.90 - 12.70 K/uL Final   10/09/2019 7.03 3.90 - 12.70 K/uL Final              Passed - eGFR within 360 days     Lab Results   Component Value Date    EGFRNONAA >60.0 12/10/2021    EGFRNONAA >60.0 10/02/2020    EGFRNONAA >60.0 10/09/2019                      Appointments  past 12m or future 3m with PCP    Date Provider   Last Visit   12/27/2021 Corry Katz MD   Next Visit   Visit date not found Corry Katz MD   ED visits in past 90 days: 0        Note composed:8:06 PM 02/22/2022

## 2022-02-26 ENCOUNTER — PATIENT MESSAGE (OUTPATIENT)
Dept: FAMILY MEDICINE | Facility: CLINIC | Age: 67
End: 2022-02-26
Payer: COMMERCIAL

## 2022-02-28 RX ORDER — VALACYCLOVIR HYDROCHLORIDE 500 MG/1
500 TABLET, FILM COATED ORAL DAILY
Qty: 90 TABLET | Refills: 3 | Status: SHIPPED | OUTPATIENT
Start: 2022-02-28 | End: 2023-01-30 | Stop reason: SDUPTHER

## 2022-02-28 NOTE — TELEPHONE ENCOUNTER
----- Message from Faby Arrieta MA sent at 2/28/2022 10:23 AM CST -----  Type: Needs Medical Advice  Who Called:  Meka Morgan Call Back Number: [phone]  Additional Information: patient is concerned about on of her prescriptions. She states she has been sending messages regarding this and has not been able to get someone to call her to speak with her to go over the issue    She would like a new Rx for 90 day with 3 refills sent in for a same day refill.  She will have to  the 14 day supply that was sent in the [facility] on 2/22/22 to hold her over until her new Rx comes in.    valACYclovir (VALTREX) 500 MG tablet 14 tablet 11 2/22/2022    Sig - Route: Take 1 tablet (500 mg total) by mouth once daily. - Oral     [facility] - [place], [place] - [address]  [facility] [address]  Phone: [phone] Fax: [phone]

## 2022-03-07 ENCOUNTER — TELEPHONE (OUTPATIENT)
Dept: FAMILY MEDICINE | Facility: CLINIC | Age: 67
End: 2022-03-07
Payer: COMMERCIAL

## 2022-03-07 NOTE — TELEPHONE ENCOUNTER
----- Message from Iwona De Jesus sent at 3/7/2022 10:31 AM CST -----  Type: Needs Medical Advice  Who Called:  patient   Best Call Back Number: [phone]   Additional Information: per patient requesting a call back to discuss something she was sent a bill for, states she spoke with her insurance company and was instructed to contact the providers office-please advise-thank you

## 2022-03-07 NOTE — TELEPHONE ENCOUNTER
Her 1/7/22 was the wellness imaging  Her 1/13/22 was the diagnostic imaging.  These are different and you can only have a diagnostic if you have an abnormal mammo and additional imaging after the 1 year mammo is diagnostic    May contact ST to discuss this further.

## 2022-05-31 ENCOUNTER — PATIENT MESSAGE (OUTPATIENT)
Dept: ADMINISTRATIVE | Facility: HOSPITAL | Age: 67
End: 2022-05-31
Payer: COMMERCIAL

## 2022-06-06 NOTE — H&P
History & Physical - Short Stay  Gastroenterology      SUBJECTIVE:     Procedure: Gastroscopy and Colonoscopy    Chief Complaint/Indication for Procedure:  FHx of stomach cancer.  Colon cancer screening.    History of Present Illness:  See PCP note referring her for endos:  Office Visit   10/16/2020  [facility]     Corry Katz MD    Family Medicine  Annual physical exam +2 more    Dx  Annual Exam     Reason for Visit     Progress Notes  Corry Katz MD (Physician)   Family Medicine  Subjective:       Patient ID: Meka Ann is a 64 y.o. female.     Chief Complaint: Annual Exam (Physical with breast exam, medication follow up )     HPI   The patient is a 64-year-old who is here today for her annual exam.  Overall, she is doing well although her  recently had a bout of cardiomyopathy this summer and is recovering from that.  She is trying to consume healthy diet.  She recently had labs which reviewed today and which all looked good.  She is coming due for her mammogram and DEXA scan and would be willing to do these.  Given her smoking history, she does qualify for low-dose lung CT which she would be willing to do.  She is hesitant to consider vaccinations but ultimately did agree to have her Pneumovax today and will come back later for her flu vaccine.     Today we discussed the followin)  Hypertension.  She is taking her Hyzaar.  Today, her blood pressure was initially 140/80 and then on recheck was 136/88.  She does have a lot of stress at home as she has a lot of visitors that have been here on an extended trip.  She believes her blood pressure is usually better than it is today usually in the 130s  2)  low back pain with history of degenerative disc disease.  She does need a refill of her hydrocodone and lodine.  She uses these very sparingly.  Usually 30 hydrocodone well last an entire year  3) history of HSV.  She does need refill of her Valtrex.  4) family  history of stomach cancer with personal history of GERD.    She is taking her Prilosec consistently.  She is due for an EGD.  She did receive a letter asking her to call to schedule this and she does plan to do so.  5) HRT.  She does need a refill of her hormones.  This does work well for her and she feels that she needs to continue with it.       Review of Systems   Constitutional: Negative for appetite change, chills, diaphoresis, fatigue, fever and unexpected weight change.   HENT: Negative for congestion, dental problem, ear pain, hearing loss, postnasal drip, rhinorrhea, sneezing, sore throat and trouble swallowing.    Eyes: Negative for photophobia, pain, discharge and visual disturbance.   Respiratory: Negative for cough, chest tightness, shortness of breath and wheezing.    Cardiovascular: Negative for chest pain, palpitations and leg swelling.   Gastrointestinal: Negative for abdominal distention, abdominal pain, blood in stool, constipation, diarrhea, nausea and vomiting.     A/P:  1) annual exam.  Health maintenance issues and anticipatory guidance issues were discussed.  She was given her Pneumovax today.  She will return for her flu shot soon.  We will schedule her mammogram, DEXA scan at low-dose lung CT  2) osteopenia.  Status unknown.  We are repeating a DEXA scan as above  3) hypertension.  Fairly well controlled.  Given her history of hypertensive retinopathy, we may decide to be more aggressive with her blood pressure control.  For now she will continue with her Hyzaar but we may consider additional agents in the future   4)  Chronic low back pain with history of degenerative disc disease.  Intermittent.  I did refill her Norco and lodeine  5) history of HSV.  Well controlled.  Continue with Valtrex   6)  Family history of stomach cancer and personal history of GERD.  Continue with Prilosec.  She will schedule her EGD   7)  HRT.  She understands the risks and benefits of HRT.  She will continue  with yearly mammograms.  I did refill her HRT today               See last Upper GI endoscopy 10/19/2017:  Indications:         Dyspepsia, Esophageal reflux, Family history of                        gastric cancer (Father, and an uncle). Last EGD                        7/10/2013.   Impression:          - Normal oropharynx.                        - Normal esophagus.                        - Z-line regular, 38 cm from the incisors.                        - Normal cardia (no hiatal hernia). .                        - Antritis. Biopsied.                        - Normal stomach otherwise.                        - Normal examined duodenum.   Recommendation:      - Discharge patient to home.                        - Await pathology and CLOtest results.                        - Follow an antireflux regimen.                        - Continue present medications.                        - Use Prilosec (omeprazole) 40 mg PO daily for 1                        month.                        - Repeat upper endoscopy at the next colonoscopy.                        - Call the G.I[facility] clinic in 2 weeks for reports (if                        you haven't heard from us sooner) [phone].   Monty Solis MD   10/19/2017     FINAL PATHOLOGIC DIAGNOSIS  Stomach, antrum, biopsy:  - Antral mucosa with mild chronic gastritis.  - Immunostain for H. pylori is negative. Appropriate positive and negative controls are examined.      See last Colonoscopy 7/10/2013:  Indications:         Change in bowel habits (smaller stool caliber), This                        is the patient's first colonoscopy     Impression:  - Mild colonic spasm consistent with irritable bowel                        syndrome.                        - Tortuous colon.                        - Internal hemorrhoids.                        - The examination was otherwise normal.                        - The examined portion of the ileum was normal.   Recommendation:      -  Discharge patient to home.                        - High fiber diet.                        - Use fiber, for example Citrucel, Fibercon, Konsyl                        or Metamucil.                        - Take a PROBIOTIC, such as a carton of GREEK YOGURT                        (Chobani or Oikos, or Activia or Dannon); or tablets                        of ALIGN or CULTURELLE or VIMAL-Q (all                        non-prescription), every day for a month.                        - Repeat colonoscopy in 7-8 years for screening purposes.   Monty Solis MD   7/10/2013         PTA Medications   Medication Sig    amLODIPine (NORVASC) 10 MG tablet Take 1 tablet (10 mg total) by mouth once daily.    calcium-vitamin D (OSCAL) 250 (625)-125 mg-unit per tablet Take 1 tablet by mouth once daily.    FYAVOLV 1-5 mg-mcg Tab TAKE 1 TABLET ONCE DAILY    losartan-hydrochlorothiazide 100-25 mg (HYZAAR) 100-25 mg per tablet Take 1 tablet by mouth once daily.    magnesium oxide-Mg AA chelate (MG-PLUS-PROTEIN) 133 mg Tab Take 700 mg by mouth once daily.    multivitamin capsule Take 1 capsule by mouth once daily.    omeprazole (PRILOSEC) 40 MG capsule TAKE 1 CAPSULE(40 MG) BY MOUTH BEFORE BREAKFAST    rosuvastatin (CRESTOR) 10 MG tablet Take 1 tablet (10 mg total) by mouth once daily.    valACYclovir (VALTREX) 500 MG tablet Take 1 tablet (500 mg total) by mouth once daily.    etodolac (LODINE) 500 MG tablet TAKE 1 TABLET(500 MG) BY MOUTH TWICE DAILY AS NEEDED (Patient not taking: Reported on 6/2/2022)    HYDROcodone-acetaminophen (NORCO) 5-325 mg per tablet Take 1 tablet by mouth every 6 (six) hours as needed for Pain. (Patient not taking: Reported on 6/2/2022)       Review of patient's allergies indicates:   Allergen Reactions    Septra [sulfamethoxazole-trimethoprim] Hives        Past Medical History:   Diagnosis Date    Coronary artery calcification     noted on CT 9/21    DDD (degenerative disc disease), lumbar   "   s/p PT and ANN; last MRI 6/08    Emphysema of lung     noted on CT    Family history- stomach cancer     next EGD due 10/20    GERD (gastroesophageal reflux disease)     H/O esophagogastroduodenoscopy     10/17 next due 10/20    History of esophagitis     History of gastritis     EGD 7/13    History of herpes simplex infection     History of SCC (squamous cell carcinoma) of skin 03/09/2021    central chest    History of squamous cell carcinoma of skin     knee    History of tobacco abuse     Hypertension     Hypertensive retinopathy     Osteopenia     dexa 7/14, 9/17, 10/20    Prediabetes     Pulmonary nodules     next CT 9/21    S/P colonoscopy      Past Surgical History:   Procedure Laterality Date    COLONOSCOPY      ESOPHAGOGASTRODUODENOSCOPY      TUBAL LIGATION       Family History   Problem Relation Age of Onset    Diabetes Mother     Cancer Mother         melanoma    Melanoma Mother     Cancer Father         stomach    Stroke Sister 58    Emphysema Maternal Grandfather     Cancer Maternal Aunt         breast cancer    Breast cancer Maternal Aunt         40s    Cancer Paternal Uncle         stomach    Eczema Neg Hx     Lupus Neg Hx     Psoriasis Neg Hx      Social History     Tobacco Use    Smoking status: Former Smoker     Packs/day: 2.00     Years: 35.00     Pack years: 70.00     Quit date: 5/16/2007     Years since quitting: 15.0    Smokeless tobacco: Never Used   Substance Use Topics    Alcohol use: Yes     Alcohol/week: 6.0 standard drinks     Types: 6 Glasses of wine per week     Comment: socially    Drug use: No         OBJECTIVE:     Vital Signs (Most Recent)  Temp: 98.2 °F (36.8 °C) (06/07/22 0711)  Pulse: 89 (06/07/22 0711)  Resp: 18 (06/07/22 0711)  BP: (!) 141/84 (06/07/22 0711)  SpO2: 95 % (06/07/22 0711)    Physical Exam:  : Ht: 5' 1" (154.9 cm)   Wt: 63 kg (139 lb)   BMI: 26.26 kg/m²                                                         GENERAL:  " Comfortable, in no acute distress.                                 HEENT EXAM:  Nonicteric.  No adenopathy.  Oropharynx is clear.               NECK:  Supple.                                                               LUNGS:  Clear.                                                               CARDIAC:  Regular rate and rhythm.  S1, S2.  No murmur.                      ABDOMEN:  Soft, positive bowel sounds, nontender.  No hepatosplenomegaly or masses.  No rebound or guarding.                                             EXTREMITIES:  No edema.     MENTAL STATUS:  Alert and oriented.    ASSESSMENT/PLAN:     Assessment:  FHx of stomach cancer.  Colon cancer screening.    Plan: Gastroscopy    Anesthesia Plan:   MAC / General Anaesthesia    ASA Grade: ASA 2 - Patient with mild systemic disease with no functional limitations    MALLAMPATI SCORE: I (soft palate, uvula, fauces, and tonsillar pillars visible)

## 2022-06-07 ENCOUNTER — ANESTHESIA EVENT (OUTPATIENT)
Dept: ENDOSCOPY | Facility: HOSPITAL | Age: 67
End: 2022-06-07
Payer: COMMERCIAL

## 2022-06-07 ENCOUNTER — ANESTHESIA (OUTPATIENT)
Dept: ENDOSCOPY | Facility: HOSPITAL | Age: 67
End: 2022-06-07
Payer: COMMERCIAL

## 2022-06-07 ENCOUNTER — HOSPITAL ENCOUNTER (OUTPATIENT)
Facility: HOSPITAL | Age: 67
Discharge: HOME | End: 2022-06-07
Attending: INTERNAL MEDICINE | Admitting: INTERNAL MEDICINE
Payer: COMMERCIAL

## 2022-06-07 VITALS
HEIGHT: 61 IN | SYSTOLIC BLOOD PRESSURE: 135 MMHG | RESPIRATION RATE: 18 BRPM | HEART RATE: 85 BPM | DIASTOLIC BLOOD PRESSURE: 71 MMHG | TEMPERATURE: 98 F | OXYGEN SATURATION: 98 % | WEIGHT: 139 LBS | BODY MASS INDEX: 26.24 KG/M2

## 2022-06-07 DIAGNOSIS — Z12.11 COLON CANCER SCREENING: ICD-10-CM

## 2022-06-07 PROBLEM — Z80.0 FAMILY HISTORY- STOMACH CANCER: Status: ACTIVE | Noted: 2022-06-07

## 2022-06-07 PROCEDURE — 43239 PR EGD, FLEX, W/BIOPSY, SGL/MULTI: ICD-10-PCS | Mod: 51,,, | Performed by: INTERNAL MEDICINE

## 2022-06-07 PROCEDURE — 63600175 PHARM REV CODE 636 W HCPCS: Mod: PO | Performed by: INTERNAL MEDICINE

## 2022-06-07 PROCEDURE — 88342 CHG IMMUNOCYTOCHEMISTRY: ICD-10-PCS | Mod: 26,,, | Performed by: PATHOLOGY

## 2022-06-07 PROCEDURE — 25000003 PHARM REV CODE 250: Mod: PO | Performed by: NURSE ANESTHETIST, CERTIFIED REGISTERED

## 2022-06-07 PROCEDURE — D9220A PRA ANESTHESIA: ICD-10-PCS | Mod: CRNA,,, | Performed by: NURSE ANESTHETIST, CERTIFIED REGISTERED

## 2022-06-07 PROCEDURE — 88305 TISSUE EXAM BY PATHOLOGIST: CPT | Mod: 26,,, | Performed by: PATHOLOGY

## 2022-06-07 PROCEDURE — G0121 COLON CA SCRN NOT HI RSK IND: ICD-10-PCS | Mod: ,,, | Performed by: INTERNAL MEDICINE

## 2022-06-07 PROCEDURE — 37000009 HC ANESTHESIA EA ADD 15 MINS: Mod: PO | Performed by: INTERNAL MEDICINE

## 2022-06-07 PROCEDURE — D9220A PRA ANESTHESIA: Mod: ANES,,, | Performed by: ANESTHESIOLOGY

## 2022-06-07 PROCEDURE — 88342 IMHCHEM/IMCYTCHM 1ST ANTB: CPT | Performed by: PATHOLOGY

## 2022-06-07 PROCEDURE — 88305 TISSUE EXAM BY PATHOLOGIST: CPT | Mod: 59 | Performed by: PATHOLOGY

## 2022-06-07 PROCEDURE — G0121 COLON CA SCRN NOT HI RSK IND: HCPCS | Mod: PO | Performed by: INTERNAL MEDICINE

## 2022-06-07 PROCEDURE — 88342 IMHCHEM/IMCYTCHM 1ST ANTB: CPT | Mod: 26,,, | Performed by: PATHOLOGY

## 2022-06-07 PROCEDURE — 88305 TISSUE EXAM BY PATHOLOGIST: ICD-10-PCS | Mod: 26,,, | Performed by: PATHOLOGY

## 2022-06-07 PROCEDURE — 63600175 PHARM REV CODE 636 W HCPCS: Mod: PO | Performed by: NURSE ANESTHETIST, CERTIFIED REGISTERED

## 2022-06-07 PROCEDURE — 37000008 HC ANESTHESIA 1ST 15 MINUTES: Mod: PO | Performed by: INTERNAL MEDICINE

## 2022-06-07 PROCEDURE — 27201012 HC FORCEPS, HOT/COLD, DISP: Mod: PO | Performed by: INTERNAL MEDICINE

## 2022-06-07 PROCEDURE — 43239 EGD BIOPSY SINGLE/MULTIPLE: CPT | Mod: 51,,, | Performed by: INTERNAL MEDICINE

## 2022-06-07 PROCEDURE — G0121 COLON CA SCRN NOT HI RSK IND: HCPCS | Mod: ,,, | Performed by: INTERNAL MEDICINE

## 2022-06-07 PROCEDURE — D9220A PRA ANESTHESIA: ICD-10-PCS | Mod: ANES,,, | Performed by: ANESTHESIOLOGY

## 2022-06-07 PROCEDURE — D9220A PRA ANESTHESIA: Mod: CRNA,,, | Performed by: NURSE ANESTHETIST, CERTIFIED REGISTERED

## 2022-06-07 PROCEDURE — 43239 EGD BIOPSY SINGLE/MULTIPLE: CPT | Mod: PO | Performed by: INTERNAL MEDICINE

## 2022-06-07 RX ORDER — SODIUM CHLORIDE 0.9 % (FLUSH) 0.9 %
10 SYRINGE (ML) INJECTION
Status: DISCONTINUED | OUTPATIENT
Start: 2022-06-07 | End: 2022-06-07 | Stop reason: HOSPADM

## 2022-06-07 RX ORDER — SODIUM CHLORIDE, SODIUM LACTATE, POTASSIUM CHLORIDE, CALCIUM CHLORIDE 600; 310; 30; 20 MG/100ML; MG/100ML; MG/100ML; MG/100ML
INJECTION, SOLUTION INTRAVENOUS CONTINUOUS
Status: DISCONTINUED | OUTPATIENT
Start: 2022-06-07 | End: 2022-06-07 | Stop reason: HOSPADM

## 2022-06-07 RX ORDER — PROPOFOL 10 MG/ML
VIAL (ML) INTRAVENOUS
Status: DISCONTINUED | OUTPATIENT
Start: 2022-06-07 | End: 2022-06-07

## 2022-06-07 RX ORDER — LIDOCAINE HYDROCHLORIDE 20 MG/ML
INJECTION INTRAVENOUS
Status: DISCONTINUED | OUTPATIENT
Start: 2022-06-07 | End: 2022-06-07

## 2022-06-07 RX ORDER — SODIUM CHLORIDE 0.9 % (FLUSH) 0.9 %
10 SYRINGE (ML) INJECTION
Status: CANCELLED | OUTPATIENT
Start: 2022-06-07

## 2022-06-07 RX ADMIN — LIDOCAINE HYDROCHLORIDE 75 MG: 20 INJECTION, SOLUTION INTRAVENOUS at 07:06

## 2022-06-07 RX ADMIN — PROPOFOL 30 MG: 10 INJECTION, EMULSION INTRAVENOUS at 08:06

## 2022-06-07 RX ADMIN — SODIUM CHLORIDE, SODIUM LACTATE, POTASSIUM CHLORIDE, AND CALCIUM CHLORIDE: .6; .31; .03; .02 INJECTION, SOLUTION INTRAVENOUS at 07:06

## 2022-06-07 RX ADMIN — PROPOFOL 30 MG: 10 INJECTION, EMULSION INTRAVENOUS at 07:06

## 2022-06-07 RX ADMIN — PROPOFOL 40 MG: 10 INJECTION, EMULSION INTRAVENOUS at 07:06

## 2022-06-07 RX ADMIN — PROPOFOL 140 MG: 10 INJECTION, EMULSION INTRAVENOUS at 07:06

## 2022-06-07 NOTE — PROVATION PATIENT INSTRUCTIONS
Discharge Summary/Instructions after an Endoscopic Procedure  Patient Name: Meka Ann  Patient MRN: [id number]  Patient YOB: 1955 Tuesday, June 7, 2022  Monty Solis MD  Dear patient,  As a result of recent federal legislation (The Federal Cures Act), you may   receive lab or pathology results from your procedure in your [facility]   account before your physician is able to contact you. Your physician or   their representative will relay the results to you with their   recommendations at their soonest availability.  Thank you,  RESTRICTIONS:  During your procedure today, you received medications for sedation.  These   medications may affect your judgment, balance and coordination.  Therefore,   for 24 hours, you have the following restrictions:   - DO NOT drive a car, operate machinery, make legal/financial decisions,   sign important papers or drink alcohol.    ACTIVITY:  Today: no heavy lifting, straining or running due to procedural   sedation/anesthesia.  The following day: return to full activity including work.  DIET:  Eat and drink normally unless instructed otherwise.     TREATMENT FOR COMMON SIDE EFFECTS:  - Mild abdominal pain, nausea, belching, bloating or excessive gas:  rest,   eat lightly and use a heating pad.  - Sore Throat: treat with throat lozenges and/or gargle with warm salt   water.  - Because air was used during the procedure, expelling large amounts of air   from your rectum or belching is normal.  - If a bowel prep was taken, you may not have a bowel movement for 1-3 days.    This is normal.  SYMPTOMS TO WATCH FOR AND REPORT TO YOUR PHYSICIAN:  1. Abdominal pain or bloating, other than gas cramps.  2. Chest pain.  3. Back pain.  4. Signs of infection such as: chills or fever occurring within 24 hours   after the procedure.  5. Rectal bleeding, which would show as bright red, maroon, or black stools.   (A tablespoon of blood from the rectum is not serious, especially  if   hemorrhoids are present.)  6. Vomiting.  7. Weakness or dizziness.  GO DIRECTLY TO THE NEAREST EMERGENCY ROOM IF YOU HAVE ANY OF THE FOLLOWING:      Difficulty breathing              Chills and/or fever over 101 F   Persistent vomiting and/or vomiting blood   Severe abdominal pain   Severe chest pain   Black, tarry stools   Bleeding- more than one tablespoon   Any other symptom or condition that you feel may need urgent attention  Your doctor recommends these additional instructions:  If any biopsies were taken, your doctors clinic will contact you in 1 to 2   weeks with any results.  Continue your present medications.   Take Prilosec (omeprazole) 40 mg by mouth once a day.   Your physician has recommended a repeat upper endoscopy in 3 years for   surveillance.  For questions, problems or results please call your physician - Monty Solis MD at Work:  [phone].  EMERGENCY PHONE NUMBER: [phone], LAB RESULTS: [phone]  IF A COMPLICATION OR EMERGENCY SITUATION ARISES AND YOU ARE UNABLE TO REACH   YOUR PHYSICIAN - GO DIRECTLY TO THE EMERGENCY ROOM.  ___________________________________________  Nurse Signature  ___________________________________________  Patient/Designated Responsible Party Signature  Monty Solis MD  6/7/2022 8:39:38 AM  This report has been verified and signed electronically.  Dear patient,  As a result of recent federal legislation (The Federal Cures Act), you may   receive lab or pathology results from your procedure in your [facility]   account before your physician is able to contact you. Your physician or   their representative will relay the results to you with their   recommendations at their soonest availability.  Thank you.  PROVATION

## 2022-06-07 NOTE — TRANSFER OF CARE
"Anesthesia Transfer of Care Note    Patient: Meka Ann    Procedure(s) Performed: Procedure(s) (LRB):  EGD (ESOPHAGOGASTRODUODENOSCOPY) (N/A)  COLONOSCOPY (N/A)    Patient location: PACU    Anesthesia Type: general    Transport from OR: Transported from OR on room air with adequate spontaneous ventilation    Post pain: adequate analgesia    Post assessment: no apparent anesthetic complications    Post vital signs: stable    Level of consciousness: awake and sedated    Nausea/Vomiting: no nausea/vomiting    Complications: none    Transfer of care protocol was followed      Last vitals:   Visit Vitals  BP (!) 141/84   Pulse 89   Temp 36.8 °C (98.2 °F)   Resp 18   Ht 5' 1" (1.549 m)   Wt 63 kg (139 lb)   SpO2 95%   Breastfeeding No   BMI 26.26 kg/m²     "

## 2022-06-07 NOTE — ANESTHESIA POSTPROCEDURE EVALUATION
Anesthesia Post Evaluation    Patient: Meka Ann    Procedure(s) Performed: Procedure(s) (LRB):  EGD (ESOPHAGOGASTRODUODENOSCOPY) (N/A)  COLONOSCOPY (N/A)    Final Anesthesia Type: general      Patient location during evaluation: PACU  Patient participation: Yes- Able to Participate  Level of consciousness: awake and alert  Post-procedure vital signs: reviewed and stable  Pain management: adequate  Airway patency: patent    PONV status at discharge: No PONV  Anesthetic complications: no      Cardiovascular status: hemodynamically stable  Respiratory status: unassisted and room air  Hydration status: euvolemic  Follow-up not needed.          Vitals Value Taken Time   /71 06/07/22 0900   Temp 36.4 °C (97.5 °F) 06/07/22 0830   Pulse 85 06/07/22 0900   Resp 18 06/07/22 0900   SpO2 98 % 06/07/22 0900         Event Time   Out of Recovery 09:12:03         Pain/Linda Score: Linda Score: 10 (6/7/2022  9:00 AM)

## 2022-06-07 NOTE — PROVATION PATIENT INSTRUCTIONS
Discharge Summary/Instructions for after Colonoscopy without   Biopsy/Polypectomy  Meka Ann    Tuesday, June 7, 2022  Monty Solis MD  RESTRICTIONS ON ACTIVITY:  - Do not drive a car or operate machinery until the day after the procedure.      - The following day: return to full activity including work.  - For  3 days: No heavy lifting, straining or running.  - Diet: You may eat solid foods, but no gassy foods (i.e. beans, broccoli,   cabbage, etc).  TREATMENT FOR COMMON SIDE EFFECTS:  - Mild abdominal pain and bloating or excessive gas: rest, eat lightly and   use a heating pad.  SYMPTOMS TO WATCH FOR AND REPORT TO YOUR PHYSICIAN:  1. Severe abdominal pain.  2. Fever within 24 hours after a procedure.  3. A large amount of rectal bleeding. (A small amount of blood from the   rectum is not serious, especially if hemorrhoids are present.  3.  Because air was put into your colon during the procedure, expelling   large amounts of air from your rectum is normal.  4.  You may not have a bowel movement for 1-3 days because of the   colonoscopy prep.  This is normal.  5.  Call immediately if you notice any of the following:   Chills and/or fever over 101   Persistent vomiting   Severe abdominal pain, other than gas cramps   Severe chest pain   Black, tarry stools   Any bleeding - exceeding one tablespoon  Your doctor recommends these additional instructions:  Eat a high fiber diet.   Take a fiber supplement, for example Citrucel, Fibercon, Konsyl or   Metamucil.   Continue your present medications.   Your physician has recommended a repeat colonoscopy in 8-10 years for   screening purposes.  None  If you have any questions or problems, please call your physician.  EMERGENCY PHONE NUMBER: [phone]  LAB RESULTS: Call in two (2) weeks for lab results, [phone]  ___________________________________________  Nurse Signature  ___________________________________________  Patient/Designated Responsible Party  Signature  Monty Solis MD  6/7/2022 8:52:48 AM  This report has been verified and signed electronically.  Dear patient,  As a result of recent federal legislation (The Federal Cures Act), you may   receive lab or pathology results from your procedure in your [facility]   account before your physician is able to contact you. Your physician or   their representative will relay the results to you with their   recommendations at their soonest availability.  Thank you.  PROVATION

## 2022-06-07 NOTE — BRIEF OP NOTE
Discharge Note  Short Stay      SUMMARY     Admit Date: 6/7/2022    Attending Physician: Monty Solis Jr., MD     Discharge Physician: Monty Solis Jr., MD    Discharge Date: 6/7/2022 8:42 AM    Final Diagnosis: Dyspepsia [R10.13]  Gastroesophageal reflux disease, unspecified whether esophagitis present [K21.9]  Family history- stomach cancer [Z80.0]  Screening for colon cancer [Z12.11]    Impression:            - Normal oropharynx.                          - Normal esophagus.                          - Z-line regular, 39 cm from the incisors.                          - Patulous lower esophageal sphincter.                          - Fundus gastritis. Biopsied.                          - Greater curve gastritis. Biopsied.                          - Gastric mucosal atrophy.                          - Slight antritis. Biopsied.                          - Normal stomach otherwise.                          - Normal pylorus.                          - Normal examined duodenum.                          - Normal major papilla.   Recommendation:        - Perform a colonoscopy as previously scheduled.                          - Discharge patient to home after that.                          - Await pathology results.                          - Continue present medications.                          - Use Prilosec (omeprazole) 40 mg PO daily.                          - Repeat upper endoscopy in 3 years for                          surveillance.         Monty Solis MD   6/7/2022         Disposition: HOME OR SELF CARE    Patient Instructions:   Current Discharge Medication List      CONTINUE these medications which have NOT CHANGED    Details   amLODIPine (NORVASC) 10 MG tablet Take 1 tablet (10 mg total) by mouth once daily.  Qty: 90 tablet, Refills: 1    Comments: .      calcium-vitamin D (OSCAL) 250 (625)-125 mg-unit per tablet Take 1 tablet by mouth once daily.      FYAVOLV 1-5 mg-mcg Tab TAKE 1 TABLET ONCE  DAILY  Qty: 84 tablet, Refills: 3      losartan-hydrochlorothiazide 100-25 mg (HYZAAR) 100-25 mg per tablet Take 1 tablet by mouth once daily.  Qty: 90 tablet, Refills: 3    Comments: .      magnesium oxide-Mg AA chelate (MG-PLUS-PROTEIN) 133 mg Tab Take 700 mg by mouth once daily.      multivitamin capsule Take 1 capsule by mouth once daily.      omeprazole (PRILOSEC) 40 MG capsule TAKE 1 CAPSULE(40 MG) BY MOUTH BEFORE BREAKFAST  Qty: 90 capsule, Refills: 3      rosuvastatin (CRESTOR) 10 MG tablet Take 1 tablet (10 mg total) by mouth once daily.  Qty: 90 tablet, Refills: 3      valACYclovir (VALTREX) 500 MG tablet Take 1 tablet (500 mg total) by mouth once daily.  Qty: 90 tablet, Refills: 3      etodolac (LODINE) 500 MG tablet TAKE 1 TABLET(500 MG) BY MOUTH TWICE DAILY AS NEEDED  Qty: 180 tablet, Refills: 0      HYDROcodone-acetaminophen (NORCO) 5-325 mg per tablet Take 1 tablet by mouth every 6 (six) hours as needed for Pain.  Qty: 28 tablet, Refills: 0    Comments: Quantity prescribed more than 7 day supply? Yes, quantity medically necessary  Associated Diagnoses: DDD (degenerative disc disease), lumbar             Discharge Procedure Orders (must include Diet, Follow-up, Activity)    Follow Up:  Follow up with PCP as per your routine.  Please follow a high fiber diet.  Activity as tolerated.    No driving day of procedure.

## 2022-06-07 NOTE — PATIENT INSTRUCTIONS
Recovery After Procedural Sedation (Adult)   You have been given medicine by vein to make you sleep during your surgery. This may have included both a pain medicine and sleeping medicine. Most of the effects have worn off. But you may still have some drowsiness for the next 6 to 8 hours.  Home care  Follow these guidelines when you get home:  For the next 8 hours, you should be watched by a responsible adult. This person should make sure your condition is not getting worse.  Don't drink any alcohol for the next 24 hours.  Don't drive, operate dangerous machinery, or make important business or personal decisions during the next 24 hours.  To prevent injury or falls, use caution when standing and walking for at least 24 hours after your procedure.  Note: Your healthcare provider may tell you not to take any medicine by mouth for pain or sleep in the next 4 hours. These medicines may react with the medicines you were given in the hospital. This could cause a much stronger response than usual.  Follow-up care  Follow up with your healthcare provider if you are not alert and back to your usual level of activity within 12 hours.  When to seek medical advice  Call your healthcare provider right away if any of these occur:  Drowsiness gets worse  Weakness or dizziness gets worse  Repeated vomiting  You can't be awakened  Fever  New rash  [employer] last reviewed this educational content on 9/1/2019  © 9456-6313 [employer]. [address] [place] [address], [place] [address]. All rights reserved. This information is not intended as a substitute for professional medical care. Always follow your healthcare professional's instructions         Tips to Control Acid Reflux    To control acid reflux, youll need to make some basic diet and lifestyle changes. The simple steps outlined below may be all youll need to ease discomfort.  Watch what you eat  Avoid fatty foods and spicy foods.  Eat fewer acidic foods, such as citrus  and tomato-based foods. These can increase symptoms.  Limit drinking alcohol, caffeine, and fizzy beverages. All increase acid reflux.  Try limiting chocolate, peppermint, and spearmint. These can worsen acid reflux in some people.  Watch when you eat  Avoid lying down for 3 hours after eating.  Do not snack before going to bed.  Raise your head  Raising your head and upper body by 4 to 6 inches helps limit reflux when youre lying down. Put blocks under the head of your bed frame to raise it.  Other changes  Lose weight, if you need to  Dont exercise near bedtime  Avoid tight-fitting clothes  Limit aspirin and ibuprofen  Stop smoking   Date Last Reviewed: 7/1/2016  © 4685-7103 [employer]. [address] [place] [address], [place] [address]. All rights reserved. This information is not intended as a substitute for professional medical care. Always follow your healthcare professional's instructions.         High-Fiber Diet  Fiber is in fruits, vegetables, cereals, and grains. Fiber passes through your body undigested. A high-fiber diet helps food move through your intestinal tract. The added bulk is helpful in preventing constipation. In people with diverticulosis, fiber helps clean out the pouches along the colon wall. It also prevents new pouches from forming. A high-fiber diet reduces the risk of colon cancer. It also lowers blood cholesterol and prevents high blood sugar in people with diabetes.    The fiber-rich foods listed below should be part of your diet. If you are not used to high-fiber foods, start with 1 or 2 foods from this list. Every 3 to 4 days add a new one to your diet. Do this until you are eating 4 high-fiber foods per day. This should give you 20 to 35 grams of fiber a day. It is also important to drink a lot of water when you are on this diet. You should have 6 to 8 glasses of water a day. Water makes the fiber swell and increases the benefit.  Foods high in dietary fiber  The following  foods are high in dietary fiber:  Breads. Breads made with 100% whole-wheat flour; elliot, wheat, or rye crackers; whole-grain tortillas, bran muffins.  Cereals. Whole-grain and bran cereals with bran (shredded wheat, wheat flakes, raisin bran, corn bran); oatmeal, rolled oats, granola, and brown rice.  Fruits. Fresh fruits and their edible skins (pears, prunes, raisins, berries, apples, and apricots); bananas, citrus fruit, mangoes, pineapple; and prune juice.  Nuts. Any nuts and seeds.  Vegetables. Best served raw or lightly cooked. All types, especially: green peas, celery, eggplant, potatoes, spinach, broccoli, [place] sprouts, winter squash, carrots, cauliflower, soybeans, lentils, and fresh and dried beans of all kinds.  Other. Popcorn, any spices.  Date Last Reviewed: 8/1/2016  © 3056-5047 [employer]. [address] [place] [address]. All rights reserved. This information is not intended as a substitute for professional medical care. Always follow your healthcare professional's instructions.

## 2022-06-07 NOTE — ANESTHESIA PREPROCEDURE EVALUATION
06/07/2022  Meka Ann is a 66 y.o., female.      Pre-op Assessment    I have reviewed the Patient Summary Reports.     I have reviewed the Nursing Notes. I have reviewed the NPO Status.   I have reviewed the Medications.     Review of Systems  Anesthesia Hx:  No problems with previous Anesthesia    Social:  Former Smoker, Alcohol Use    Hematology/Oncology:  Hematology Normal   Oncology Normal     EENT/Dental:EENT/Dental Normal   Cardiovascular:   Hypertension CAD      Pulmonary:   COPD    Hepatic/GI:   GERD    Musculoskeletal:   Arthritis         Physical Exam  General: Well nourished, Cooperative, Alert and Oriented    Airway:  Mallampati: II   Mouth Opening: Normal  TM Distance: Normal  Neck ROM: Normal ROM    Dental:  Intact        Anesthesia Plan  Type of Anesthesia, risks & benefits discussed:    Anesthesia Type: Gen Natural Airway  Intra-op Monitoring Plan: Standard ASA Monitors  Induction:  IV  Informed Consent: Informed consent signed with the Patient and all parties understand the risks and agree with anesthesia plan.  All questions answered.   ASA Score: 3  Day of Surgery Review of History & Physical: H&P Update referred to the surgeon/provider.    Ready For Surgery From Anesthesia Perspective.     .

## 2022-06-15 LAB
FINAL PATHOLOGIC DIAGNOSIS: NORMAL
Lab: NORMAL

## 2022-06-28 ENCOUNTER — TELEPHONE (OUTPATIENT)
Dept: GASTROENTEROLOGY | Facility: CLINIC | Age: 67
End: 2022-06-28
Payer: COMMERCIAL

## 2022-08-08 NOTE — TELEPHONE ENCOUNTER
----- Message from Bettina Prince sent at 6/30/2018 10:13 AM CDT -----  Contact: [phone]  Patient is returning nurse's phone call.  Please call patient back at [phone].   
Called patient's  and scheduled patients fasting labs per his request.  Patient's  verbalizes understanding.  
Orders in  
Returned pts call and spoke with her , as pt was not home.  Pts  states that pt has an appt with Dr. Katz on 8/3/18 and is inquiring if any blood work is needed prior.  Explained to pts  that I will ask Dr. Katz and get back with pt when I find out.  Pts  verbalizes understanding.  
15
15
30
30
15

## 2022-10-03 NOTE — TELEPHONE ENCOUNTER
----- Message from Zoe De Anda sent at 10/3/2022 10:29 AM CDT -----  Type: Needs Medical Advice  Who Called:  pt said she need to speak to a nurse about her meds that do not have a refill on the like her BP meds --amLODIPine (NORVASC) 10 MG tablet said if she need an appt to get a refill please let her know she also need omeprazole (PRILOSEC) 40 MG capsule--please call and advise--  Best Call Back Number: [phone] (home)     Additional Information: thank you

## 2022-10-03 NOTE — TELEPHONE ENCOUNTER
Care Due:                  Date            Visit Type   Department     Provider  --------------------------------------------------------------------------------                                EP -                              PRIMARY      [facility] Corry Katz  Last Visit: 12-      CARE ([facility])   MEDICINE       Anger                              EP -                              PRIMARY      [facility]    Corry Katz  Next Visit: 12-      CARE ([facility])   MEDICINE       Anger                                                            Last  Test          Frequency    Reason                     Performed    Due Date  --------------------------------------------------------------------------------    CBC.........  12 months..  valACYclovir.............  12-   12-    CMP.........  12 months..  losartan-hydrochlorothiaz  12-   12-                             arthur, rosuvastatin,                             valACYclovir.............    Lipid Panel.  12 months..  rosuvastatin.............  12-   12-    [facility] Embedded Care Gaps. Reference number: [id number]. 10/03/2022   12:37:44 PM CDT

## 2022-10-05 RX ORDER — OMEPRAZOLE 40 MG/1
CAPSULE, DELAYED RELEASE ORAL
Qty: 90 CAPSULE | Refills: 0 | Status: SHIPPED | OUTPATIENT
Start: 2022-10-05 | End: 2022-12-09 | Stop reason: SDUPTHER

## 2022-10-05 RX ORDER — AMLODIPINE BESYLATE 10 MG/1
10 TABLET ORAL DAILY
Qty: 90 TABLET | Refills: 0 | Status: SHIPPED | OUTPATIENT
Start: 2022-10-05 | End: 2022-12-09 | Stop reason: SDUPTHER

## 2022-11-25 ENCOUNTER — PATIENT OUTREACH (OUTPATIENT)
Dept: ADMINISTRATIVE | Facility: HOSPITAL | Age: 67
End: 2022-11-25
Payer: COMMERCIAL

## 2022-11-25 NOTE — PROGRESS NOTES
2022 Care Everywhere updates requested and reviewed.  Immunizations reconciled. Media reports reviewed.  Duplicate HM overrides and  orders removed.  Overdue HM topic chart audit and/or requested.  Overdue lab testing linked to upcoming lab appointments if applies.    Health Maintenance Due   Topic Date Due    Shingles Vaccine (1 of 2) Never done    Pneumococcal Vaccines (Age 65+) (2 - PCV) 10/16/2021    COVID-19 Vaccine (4 - Booster for Pfizer series) 2021    Influenza Vaccine (1) 2022      Patient/Family Goals    • Patient/Family Long Term Goal Progressing        POSTPARTUM    • Long Term Goal:Experiences normal postpartum course Progressing    • Optimize infant feeding at the breast Progressing    • Appropriate maternal -  bonding Pr

## 2022-12-09 ENCOUNTER — OFFICE VISIT (OUTPATIENT)
Dept: FAMILY MEDICINE | Facility: CLINIC | Age: 67
End: 2022-12-09
Payer: COMMERCIAL

## 2022-12-09 VITALS
HEIGHT: 61 IN | WEIGHT: 140.44 LBS | TEMPERATURE: 98 F | HEART RATE: 79 BPM | SYSTOLIC BLOOD PRESSURE: 124 MMHG | DIASTOLIC BLOOD PRESSURE: 76 MMHG | RESPIRATION RATE: 18 BRPM | BODY MASS INDEX: 26.51 KG/M2 | OXYGEN SATURATION: 98 %

## 2022-12-09 DIAGNOSIS — Z00.00 ANNUAL PHYSICAL EXAM: Primary | ICD-10-CM

## 2022-12-09 DIAGNOSIS — M51.36 DDD (DEGENERATIVE DISC DISEASE), LUMBAR: ICD-10-CM

## 2022-12-09 DIAGNOSIS — E78.5 HYPERLIPIDEMIA, UNSPECIFIED HYPERLIPIDEMIA TYPE: ICD-10-CM

## 2022-12-09 DIAGNOSIS — I10 HYPERTENSION, ESSENTIAL: ICD-10-CM

## 2022-12-09 PROCEDURE — 1101F PR PT FALLS ASSESS DOC 0-1 FALLS W/OUT INJ PAST YR: ICD-10-PCS | Mod: CPTII,S$GLB,, | Performed by: FAMILY MEDICINE

## 2022-12-09 PROCEDURE — 90471 IMMUNIZATION ADMIN: CPT | Mod: S$GLB,,, | Performed by: FAMILY MEDICINE

## 2022-12-09 PROCEDURE — 90694 FLU VACCINE - QUADRIVALENT - ADJUVANTED: ICD-10-PCS | Mod: S$GLB,,, | Performed by: FAMILY MEDICINE

## 2022-12-09 PROCEDURE — 83036 HEMOGLOBIN GLYCOSYLATED A1C: CPT | Performed by: FAMILY MEDICINE

## 2022-12-09 PROCEDURE — 3074F PR MOST RECENT SYSTOLIC BLOOD PRESSURE < 130 MM HG: ICD-10-PCS | Mod: CPTII,S$GLB,, | Performed by: FAMILY MEDICINE

## 2022-12-09 PROCEDURE — 99397 PER PM REEVAL EST PAT 65+ YR: CPT | Mod: 25,S$GLB,, | Performed by: FAMILY MEDICINE

## 2022-12-09 PROCEDURE — 90471 FLU VACCINE - QUADRIVALENT - ADJUVANTED: ICD-10-PCS | Mod: S$GLB,,, | Performed by: FAMILY MEDICINE

## 2022-12-09 PROCEDURE — 1160F PR REVIEW ALL MEDS BY PRESCRIBER/CLIN PHARMACIST DOCUMENTED: ICD-10-PCS | Mod: CPTII,S$GLB,, | Performed by: FAMILY MEDICINE

## 2022-12-09 PROCEDURE — 90677 PCV20 VACCINE IM: CPT | Mod: S$GLB,,, | Performed by: FAMILY MEDICINE

## 2022-12-09 PROCEDURE — 85025 COMPLETE CBC W/AUTO DIFF WBC: CPT | Performed by: FAMILY MEDICINE

## 2022-12-09 PROCEDURE — 3078F PR MOST RECENT DIASTOLIC BLOOD PRESSURE < 80 MM HG: ICD-10-PCS | Mod: CPTII,S$GLB,, | Performed by: FAMILY MEDICINE

## 2022-12-09 PROCEDURE — 99397 PR PREVENTIVE VISIT,EST,65 & OVER: ICD-10-PCS | Mod: 25,S$GLB,, | Performed by: FAMILY MEDICINE

## 2022-12-09 PROCEDURE — 3008F BODY MASS INDEX DOCD: CPT | Mod: CPTII,S$GLB,, | Performed by: FAMILY MEDICINE

## 2022-12-09 PROCEDURE — 3044F HG A1C LEVEL LT 7.0%: CPT | Mod: CPTII,S$GLB,, | Performed by: FAMILY MEDICINE

## 2022-12-09 PROCEDURE — 3008F PR BODY MASS INDEX (BMI) DOCUMENTED: ICD-10-PCS | Mod: CPTII,S$GLB,, | Performed by: FAMILY MEDICINE

## 2022-12-09 PROCEDURE — 90677 PNEUMOCOCCAL CONJUGATE VACCINE 20-VALENT: ICD-10-PCS | Mod: S$GLB,,, | Performed by: FAMILY MEDICINE

## 2022-12-09 PROCEDURE — 1126F PR PAIN SEVERITY QUANTIFIED, NO PAIN PRESENT: ICD-10-PCS | Mod: CPTII,S$GLB,, | Performed by: FAMILY MEDICINE

## 2022-12-09 PROCEDURE — 3288F FALL RISK ASSESSMENT DOCD: CPT | Mod: CPTII,S$GLB,, | Performed by: FAMILY MEDICINE

## 2022-12-09 PROCEDURE — 3288F PR FALLS RISK ASSESSMENT DOCUMENTED: ICD-10-PCS | Mod: CPTII,S$GLB,, | Performed by: FAMILY MEDICINE

## 2022-12-09 PROCEDURE — 80053 COMPREHEN METABOLIC PANEL: CPT | Performed by: FAMILY MEDICINE

## 2022-12-09 PROCEDURE — 3044F PR MOST RECENT HEMOGLOBIN A1C LEVEL <7.0%: ICD-10-PCS | Mod: CPTII,S$GLB,, | Performed by: FAMILY MEDICINE

## 2022-12-09 PROCEDURE — 84443 ASSAY THYROID STIM HORMONE: CPT | Performed by: FAMILY MEDICINE

## 2022-12-09 PROCEDURE — 80061 LIPID PANEL: CPT | Performed by: FAMILY MEDICINE

## 2022-12-09 PROCEDURE — 1160F RVW MEDS BY RX/DR IN RCRD: CPT | Mod: CPTII,S$GLB,, | Performed by: FAMILY MEDICINE

## 2022-12-09 PROCEDURE — 90472 PNEUMOCOCCAL CONJUGATE VACCINE 20-VALENT: ICD-10-PCS | Mod: S$GLB,,, | Performed by: FAMILY MEDICINE

## 2022-12-09 PROCEDURE — 3078F DIAST BP <80 MM HG: CPT | Mod: CPTII,S$GLB,, | Performed by: FAMILY MEDICINE

## 2022-12-09 PROCEDURE — 1101F PT FALLS ASSESS-DOCD LE1/YR: CPT | Mod: CPTII,S$GLB,, | Performed by: FAMILY MEDICINE

## 2022-12-09 PROCEDURE — 3074F SYST BP LT 130 MM HG: CPT | Mod: CPTII,S$GLB,, | Performed by: FAMILY MEDICINE

## 2022-12-09 PROCEDURE — 90694 VACC AIIV4 NO PRSRV 0.5ML IM: CPT | Mod: S$GLB,,, | Performed by: FAMILY MEDICINE

## 2022-12-09 PROCEDURE — 90472 IMMUNIZATION ADMIN EACH ADD: CPT | Mod: S$GLB,,, | Performed by: FAMILY MEDICINE

## 2022-12-09 PROCEDURE — 1159F PR MEDICATION LIST DOCUMENTED IN MEDICAL RECORD: ICD-10-PCS | Mod: CPTII,S$GLB,, | Performed by: FAMILY MEDICINE

## 2022-12-09 PROCEDURE — 1126F AMNT PAIN NOTED NONE PRSNT: CPT | Mod: CPTII,S$GLB,, | Performed by: FAMILY MEDICINE

## 2022-12-09 PROCEDURE — 1159F MED LIST DOCD IN RCRD: CPT | Mod: CPTII,S$GLB,, | Performed by: FAMILY MEDICINE

## 2022-12-09 RX ORDER — AMLODIPINE BESYLATE 10 MG/1
10 TABLET ORAL DAILY
Qty: 90 TABLET | Refills: 3 | Status: SHIPPED | OUTPATIENT
Start: 2022-12-09 | End: 2023-09-22 | Stop reason: SDUPTHER

## 2022-12-09 RX ORDER — LOSARTAN POTASSIUM AND HYDROCHLOROTHIAZIDE 25; 100 MG/1; MG/1
1 TABLET ORAL DAILY
Qty: 90 TABLET | Refills: 3 | Status: SHIPPED | OUTPATIENT
Start: 2022-12-09 | End: 2023-09-22 | Stop reason: SDUPTHER

## 2022-12-09 RX ORDER — TRIAMCINOLONE ACETONIDE 1 MG/G
CREAM TOPICAL 2 TIMES DAILY
Qty: 80 G | Refills: 1 | Status: SHIPPED | OUTPATIENT
Start: 2022-12-09

## 2022-12-09 RX ORDER — HYDROCODONE BITARTRATE AND ACETAMINOPHEN 5; 325 MG/1; MG/1
1 TABLET ORAL EVERY 6 HOURS PRN
Qty: 28 TABLET | Refills: 0 | Status: SHIPPED | OUTPATIENT
Start: 2022-12-09 | End: 2024-02-06

## 2022-12-09 RX ORDER — ETODOLAC 500 MG/1
TABLET, FILM COATED ORAL
Qty: 180 TABLET | Refills: 0 | Status: SHIPPED | OUTPATIENT
Start: 2022-12-09 | End: 2023-04-21

## 2022-12-09 RX ORDER — OMEPRAZOLE 40 MG/1
CAPSULE, DELAYED RELEASE ORAL
Qty: 90 CAPSULE | Refills: 3 | Status: SHIPPED | OUTPATIENT
Start: 2022-12-09 | End: 2024-03-31

## 2022-12-10 ENCOUNTER — PATIENT MESSAGE (OUTPATIENT)
Dept: FAMILY MEDICINE | Facility: CLINIC | Age: 67
End: 2022-12-10
Payer: COMMERCIAL

## 2022-12-10 LAB
ALBUMIN SERPL BCP-MCNC: 4.2 G/DL (ref 3.5–5.2)
ALP SERPL-CCNC: 56 U/L (ref 55–135)
ALT SERPL W/O P-5'-P-CCNC: 17 U/L (ref 10–44)
ANION GAP SERPL CALC-SCNC: 8 MMOL/L (ref 8–16)
AST SERPL-CCNC: 16 U/L (ref 10–40)
BASOPHILS # BLD AUTO: 0.06 K/UL (ref 0–0.2)
BASOPHILS NFR BLD: 1 % (ref 0–1.9)
BILIRUB SERPL-MCNC: 0.5 MG/DL (ref 0.1–1)
BUN SERPL-MCNC: 10 MG/DL (ref 8–23)
CALCIUM SERPL-MCNC: 9.3 MG/DL (ref 8.7–10.5)
CHLORIDE SERPL-SCNC: 107 MMOL/L (ref 95–110)
CHOLEST SERPL-MCNC: 133 MG/DL (ref 120–199)
CHOLEST/HDLC SERPL: 2.6 {RATIO} (ref 2–5)
CO2 SERPL-SCNC: 26 MMOL/L (ref 23–29)
CREAT SERPL-MCNC: 0.8 MG/DL (ref 0.5–1.4)
DIFFERENTIAL METHOD: ABNORMAL
EOSINOPHIL # BLD AUTO: 0.1 K/UL (ref 0–0.5)
EOSINOPHIL NFR BLD: 2.2 % (ref 0–8)
ERYTHROCYTE [DISTWIDTH] IN BLOOD BY AUTOMATED COUNT: 13.4 % (ref 11.5–14.5)
EST. GFR  (NO RACE VARIABLE): >60 ML/MIN/1.73 M^2
ESTIMATED AVG GLUCOSE: 108 MG/DL (ref 68–131)
GLUCOSE SERPL-MCNC: 110 MG/DL (ref 70–110)
HBA1C MFR BLD: 5.4 % (ref 4–5.6)
HCT VFR BLD AUTO: 39 % (ref 37–48.5)
HDLC SERPL-MCNC: 52 MG/DL (ref 40–75)
HDLC SERPL: 39.1 % (ref 20–50)
HGB BLD-MCNC: 13.2 G/DL (ref 12–16)
IMM GRANULOCYTES # BLD AUTO: 0.01 K/UL (ref 0–0.04)
IMM GRANULOCYTES NFR BLD AUTO: 0.2 % (ref 0–0.5)
LDLC SERPL CALC-MCNC: 67.2 MG/DL (ref 63–159)
LYMPHOCYTES # BLD AUTO: 2.2 K/UL (ref 1–4.8)
LYMPHOCYTES NFR BLD: 36.4 % (ref 18–48)
MCH RBC QN AUTO: 31.8 PG (ref 27–31)
MCHC RBC AUTO-ENTMCNC: 33.8 G/DL (ref 32–36)
MCV RBC AUTO: 94 FL (ref 82–98)
MONOCYTES # BLD AUTO: 0.7 K/UL (ref 0.3–1)
MONOCYTES NFR BLD: 11.3 % (ref 4–15)
NEUTROPHILS # BLD AUTO: 2.9 K/UL (ref 1.8–7.7)
NEUTROPHILS NFR BLD: 48.9 % (ref 38–73)
NONHDLC SERPL-MCNC: 81 MG/DL
NRBC BLD-RTO: 0 /100 WBC
PLATELET # BLD AUTO: 286 K/UL (ref 150–450)
PMV BLD AUTO: 12.1 FL (ref 9.2–12.9)
POTASSIUM SERPL-SCNC: 3.8 MMOL/L (ref 3.5–5.1)
PROT SERPL-MCNC: 6.7 G/DL (ref 6–8.4)
RBC # BLD AUTO: 4.15 M/UL (ref 4–5.4)
SODIUM SERPL-SCNC: 141 MMOL/L (ref 136–145)
TRIGL SERPL-MCNC: 69 MG/DL (ref 30–150)
TSH SERPL DL<=0.005 MIU/L-ACNC: 0.8 UIU/ML (ref 0.4–4)
WBC # BLD AUTO: 5.94 K/UL (ref 3.9–12.7)

## 2022-12-11 NOTE — PROGRESS NOTES
Subjective:       Patient ID: Meka Ann is a 66 y.o. female.    Chief Complaint: Annual Exam    HPI  The patient is a 66-year-old who is here today for her annual exam.  Overall, she is doing well.  She is fasting today for labs.  She would be willing to have her flu shot and her pneumonia shot today.  She will be due for a mammogram early next year.  She had a colonoscopy earlier this year.  Given her family history, she is keeping up-to-date with her EGDs and her last EGD was earlier this year as well with her colonoscopy.  She does see the dermatologist yearly.  She is still taking HRT but occasionally skips them and seems to do well when she skips up to 2 days.  She is thinking about skipping more and seeing if she still needs this.    Today we also discussed the followin)  hypertension.  Today her blood pressure is 124/76.  She is taking Hyzaar and Norvasc which she is tolerating well   2)  Hyperlipidemia.  She is doing well with Crestor.  She will be having fasting labs today    She does request a refill of her Norco and lodeine which she uses very sparingly as needed    Review of Systems   Constitutional:  Negative for appetite change, chills, diaphoresis, fatigue, fever and unexpected weight change.   HENT:  Negative for congestion, dental problem, ear pain, hearing loss, postnasal drip, rhinorrhea, sneezing, sore throat and trouble swallowing.    Eyes:  Negative for photophobia, pain, discharge and visual disturbance.   Respiratory:  Negative for cough, chest tightness, shortness of breath and wheezing.    Cardiovascular:  Negative for chest pain, palpitations and leg swelling.   Gastrointestinal:  Negative for abdominal distention, abdominal pain, blood in stool, constipation, diarrhea, nausea and vomiting.   Endocrine: Negative for cold intolerance, heat intolerance, polydipsia and polyuria.   Genitourinary:  Negative for dysuria, flank pain, frequency, genital sores, hematuria, menstrual  problem and vaginal discharge.   Musculoskeletal:  Positive for back pain. Negative for arthralgias, joint swelling and myalgias.   Skin:  Negative for rash.   Neurological:  Negative for dizziness, syncope, light-headedness and headaches.   Hematological:  Negative for adenopathy. Does not bruise/bleed easily.   Psychiatric/Behavioral:  Negative for dysphoric mood, self-injury, sleep disturbance and suicidal ideas. The patient is not nervous/anxious.        Objective:      Physical Exam  Constitutional:       General: She is not in acute distress.     Appearance: Normal appearance. She is well-developed.   HENT:      Head: Normocephalic and atraumatic.      Right Ear: Hearing, tympanic membrane, ear canal and external ear normal.      Left Ear: Hearing, tympanic membrane, ear canal and external ear normal.      Nose: Nose normal.      Mouth/Throat:      Mouth: No oral lesions.      Pharynx: No oropharyngeal exudate or posterior oropharyngeal erythema.   Eyes:      General: Lids are normal. No scleral icterus.     Extraocular Movements: Extraocular movements intact.      Conjunctiva/sclera: Conjunctivae normal.      Pupils: Pupils are equal, round, and reactive to light.   Neck:      Thyroid: No thyroid mass or thyromegaly.      Vascular: No carotid bruit.   Cardiovascular:      Rate and Rhythm: Normal rate and regular rhythm. No extrasystoles are present.     Chest Wall: PMI is not displaced.      Heart sounds: Normal heart sounds. No murmur heard.    No gallop.   Pulmonary:      Effort: Pulmonary effort is normal. No accessory muscle usage or respiratory distress.      Breath sounds: Normal breath sounds.   Abdominal:      General: Bowel sounds are normal. There is no abdominal bruit.      Palpations: Abdomen is soft.      Tenderness: There is no abdominal tenderness. There is no rebound.   Musculoskeletal:      Cervical back: Normal range of motion and neck supple.   Lymphadenopathy:      Head:      Right side of  "head: No submental or submandibular adenopathy.      Left side of head: No submental or submandibular adenopathy.      Cervical:      Right cervical: No superficial, deep or posterior cervical adenopathy.     Left cervical: No superficial, deep or posterior cervical adenopathy.      Upper Body:      Right upper body: No supraclavicular adenopathy.      Left upper body: No supraclavicular adenopathy.   Skin:     General: Skin is warm and dry.   Neurological:      Mental Status: She is alert and oriented to person, place, and time.      Cranial Nerves: No cranial nerve deficit.      Sensory: No sensory deficit.   Psychiatric:         Speech: Speech normal.         Behavior: Behavior normal.         Thought Content: Thought content normal.     Blood pressure 124/76, pulse 79, temperature 98.1 °F (36.7 °C), temperature source Temporal, resp. rate 18, height 5' 1" (1.549 m), weight 63.7 kg (140 lb 6.9 oz), SpO2 98 %.Body mass index is 26.53 kg/m².          A/P:  1) annual exam.  Health maintenance issues and anticipatory guidance issues were discussed.  We will check fasting labs today.  We will schedule her mammogram for January.  We will administer flu and Prevnar today.  She will return for her Shingrix shot.  Colon cancer screening is up-to-date.  She will continue to wean the HRT and see if she can do without it  2) hypertension.  Well controlled.  Continue with Hyzaar and Norvasc  3) Hyperlipidemia.  Previously well controlled.  Continue with Crestor.  We will check fasting labs today   4) Degenerative disc disease.  I did refill her  Emmaus and lodeine for p.r.n. use  5) history of HSV.  Continue with Valtrex.      "

## 2023-02-04 ENCOUNTER — PATIENT MESSAGE (OUTPATIENT)
Dept: FAMILY MEDICINE | Facility: CLINIC | Age: 68
End: 2023-02-04
Payer: COMMERCIAL

## 2023-02-28 ENCOUNTER — OFFICE VISIT (OUTPATIENT)
Dept: DERMATOLOGY | Facility: CLINIC | Age: 68
End: 2023-02-28
Payer: COMMERCIAL

## 2023-02-28 VITALS — WEIGHT: 140 LBS | HEIGHT: 61 IN | BODY MASS INDEX: 26.43 KG/M2

## 2023-02-28 DIAGNOSIS — D22.9 MULTIPLE BENIGN NEVI: ICD-10-CM

## 2023-02-28 DIAGNOSIS — L82.1 SEBORRHEIC KERATOSES: ICD-10-CM

## 2023-02-28 DIAGNOSIS — Z85.828 HISTORY OF NONMELANOMA SKIN CANCER: ICD-10-CM

## 2023-02-28 DIAGNOSIS — D48.5 NEOPLASM OF UNCERTAIN BEHAVIOR OF SKIN: Primary | ICD-10-CM

## 2023-02-28 DIAGNOSIS — L81.4 SOLAR LENTIGO: ICD-10-CM

## 2023-02-28 DIAGNOSIS — L90.5 SCAR: ICD-10-CM

## 2023-02-28 PROCEDURE — 88342 CHG IMMUNOCYTOCHEMISTRY: ICD-10-PCS | Mod: 26,,, | Performed by: DERMATOLOGY

## 2023-02-28 PROCEDURE — 11102 PR TANGENTIAL BIOPSY, SKIN, SINGLE LESION: ICD-10-PCS | Mod: S$GLB,,, | Performed by: DERMATOLOGY

## 2023-02-28 PROCEDURE — 1126F PR PAIN SEVERITY QUANTIFIED, NO PAIN PRESENT: ICD-10-PCS | Mod: CPTII,S$GLB,, | Performed by: DERMATOLOGY

## 2023-02-28 PROCEDURE — 11102 TANGNTL BX SKIN SINGLE LES: CPT | Mod: S$GLB,,, | Performed by: DERMATOLOGY

## 2023-02-28 PROCEDURE — 1159F MED LIST DOCD IN RCRD: CPT | Mod: CPTII,S$GLB,, | Performed by: DERMATOLOGY

## 2023-02-28 PROCEDURE — 99213 PR OFFICE/OUTPT VISIT, EST, LEVL III, 20-29 MIN: ICD-10-PCS | Mod: 25,S$GLB,, | Performed by: DERMATOLOGY

## 2023-02-28 PROCEDURE — 3288F FALL RISK ASSESSMENT DOCD: CPT | Mod: CPTII,S$GLB,, | Performed by: DERMATOLOGY

## 2023-02-28 PROCEDURE — 99999 PR PBB SHADOW E&M-EST. PATIENT-LVL IV: CPT | Mod: PBBFAC,,, | Performed by: DERMATOLOGY

## 2023-02-28 PROCEDURE — 3288F PR FALLS RISK ASSESSMENT DOCUMENTED: ICD-10-PCS | Mod: CPTII,S$GLB,, | Performed by: DERMATOLOGY

## 2023-02-28 PROCEDURE — 88305 TISSUE EXAM BY PATHOLOGIST: CPT | Performed by: DERMATOLOGY

## 2023-02-28 PROCEDURE — 1101F PR PT FALLS ASSESS DOC 0-1 FALLS W/OUT INJ PAST YR: ICD-10-PCS | Mod: CPTII,S$GLB,, | Performed by: DERMATOLOGY

## 2023-02-28 PROCEDURE — 1160F RVW MEDS BY RX/DR IN RCRD: CPT | Mod: CPTII,S$GLB,, | Performed by: DERMATOLOGY

## 2023-02-28 PROCEDURE — 88305 TISSUE EXAM BY PATHOLOGIST: ICD-10-PCS | Mod: 26,,, | Performed by: DERMATOLOGY

## 2023-02-28 PROCEDURE — 88342 IMHCHEM/IMCYTCHM 1ST ANTB: CPT | Mod: 26,,, | Performed by: DERMATOLOGY

## 2023-02-28 PROCEDURE — 3008F PR BODY MASS INDEX (BMI) DOCUMENTED: ICD-10-PCS | Mod: CPTII,S$GLB,, | Performed by: DERMATOLOGY

## 2023-02-28 PROCEDURE — 88342 IMHCHEM/IMCYTCHM 1ST ANTB: CPT | Performed by: DERMATOLOGY

## 2023-02-28 PROCEDURE — 1160F PR REVIEW ALL MEDS BY PRESCRIBER/CLIN PHARMACIST DOCUMENTED: ICD-10-PCS | Mod: CPTII,S$GLB,, | Performed by: DERMATOLOGY

## 2023-02-28 PROCEDURE — 1101F PT FALLS ASSESS-DOCD LE1/YR: CPT | Mod: CPTII,S$GLB,, | Performed by: DERMATOLOGY

## 2023-02-28 PROCEDURE — 99213 OFFICE O/P EST LOW 20 MIN: CPT | Mod: 25,S$GLB,, | Performed by: DERMATOLOGY

## 2023-02-28 PROCEDURE — 3008F BODY MASS INDEX DOCD: CPT | Mod: CPTII,S$GLB,, | Performed by: DERMATOLOGY

## 2023-02-28 PROCEDURE — 99999 PR PBB SHADOW E&M-EST. PATIENT-LVL IV: ICD-10-PCS | Mod: PBBFAC,,, | Performed by: DERMATOLOGY

## 2023-02-28 PROCEDURE — 1159F PR MEDICATION LIST DOCUMENTED IN MEDICAL RECORD: ICD-10-PCS | Mod: CPTII,S$GLB,, | Performed by: DERMATOLOGY

## 2023-02-28 PROCEDURE — 1126F AMNT PAIN NOTED NONE PRSNT: CPT | Mod: CPTII,S$GLB,, | Performed by: DERMATOLOGY

## 2023-02-28 PROCEDURE — 88305 TISSUE EXAM BY PATHOLOGIST: CPT | Mod: 26,,, | Performed by: DERMATOLOGY

## 2023-02-28 NOTE — PATIENT INSTRUCTIONS
Shave Biopsy Wound Care    Your doctor has performed a shave biopsy today.  A band aid and vaseline ointment has been placed over the site.  This should remain in place for 24 hours.  It is recommended that you keep the area dry for the first 24 hours.  After 24 hours, you may remove the band aid and wash the area with warm soap and water and apply Vaseline jelly.  Many patients prefer to use Neosporin or Bacitracin ointment.  This is acceptable; however, know that you can develop an allergy to this medication even if you have used it safely for years.  It is important to keep the area moist.  Letting it dry out and get air slows healing time, and will worsen the scar.  Band aid is optional after first 24 hours.      If you notice increasing redness, tenderness, pain, or yellow drainage at the biopsy site, please notify your doctor.  These are signs of an infection.    If your biopsy site is bleeding, apply firm pressure for 15 minutes straight.  Repeat for another 15 minutes, if it is still bleeding.   If the surgical site continues to bleed, then please contact your doctor.       [facility]  [facility]ERMAT[facility]  [address] [place] [address], [address] [address]  Dept: [phone]

## 2023-02-28 NOTE — PROGRESS NOTES
Subjective:       Patient ID:  Meka Ann is a 67 y.o. female who presents for   Chief Complaint   Patient presents with    Skin Check     TBSE    Spot     Chest, left flank     LOV: 22 - AK, h/o NMSC, SK, lentigo, scar    Skin Check - TBSE    C/o spot on chest  C/o spot on left flank  C/o rash around the ankle    On HRT    Derm Hx:  SCC on R knee about 20 yrs ago  SCC - central chest E&S   Previously under the care of Dr Jacey Yañez  Intense sun exposure in youth     +Fhx of MM; mother, [place],      Current Outpatient Medications:   ·  amLODIPine (NORVASC) 10 MG tablet, Take 1 tablet (10 mg total) by mouth once daily., Disp: 90 tablet, Rfl: 3  ·  calcium-vitamin D (OSCAL) 250 (625)-125 mg-unit per tablet, Take 1 tablet by mouth once daily., Disp: , Rfl:   ·  etodolac (LODINE) 500 MG tablet, TAKE 1 TABLET(500 MG) BY MOUTH TWICE DAILY AS NEEDED, Disp: 180 tablet, Rfl: 0  ·  FYAVOLV 1-5 mg-mcg Tab, TAKE 1 TABLET ONCE DAILY, Disp: 84 tablet, Rfl: 3  ·  HYDROcodone-acetaminophen (NORCO) 5-325 mg per tablet, Take 1 tablet by mouth every 6 (six) hours as needed for Pain., Disp: 28 tablet, Rfl: 0  ·  losartan-hydrochlorothiazide 100-25 mg (HYZAAR) 100-25 mg per tablet, Take 1 tablet by mouth once daily., Disp: 90 tablet, Rfl: 3  ·  magnesium oxide-Mg AA chelate (MG-PLUS-PROTEIN) 133 mg Tab, Take 700 mg by mouth once daily., Disp: , Rfl:   ·  multivitamin capsule, Take 1 capsule by mouth once daily., Disp: , Rfl:   ·  omeprazole (PRILOSEC) 40 MG capsule, TAKE 1 CAPSULE(40 MG) BY MOUTH BEFORE BREAKFAST, Disp: 90 capsule, Rfl: 3  ·  rosuvastatin (CRESTOR) 10 MG tablet, TAKE 1 TABLET ONCE DAILY, Disp: 90 tablet, Rfl: 3  ·  triamcinolone acetonide 0.1% (KENALOG) 0.1 % cream, Apply topically 2 (two) times daily. X 10 - 14 days, Disp: 80 g, Rfl: 1  ·  valACYclovir (VALTREX) 500 MG tablet, Take 1 tablet (500 mg total) by mouth once daily., Disp: 90 tablet, Rfl: 3      Review of Systems   Constitutional:   Negative for fever, chills and fatigue.   Skin:  Positive for daily sunscreen use and activity-related sunscreen use. Negative for itching, rash and wears hat.   Hematologic/Lymphatic: Does not bruise/bleed easily.      Objective:    Physical Exam   Constitutional: She appears well-developed and well-nourished. No distress.   Neurological: She is alert and oriented to person, place, and time. She is not disoriented.   Psychiatric: She has a normal mood and affect.   Skin:   Areas Examined (abnormalities noted in diagram):   Scalp / Hair Palpated and Inspected  Head / Face Inspection Performed  Neck Inspection Performed  Chest / Axilla Inspection Performed  Abdomen Inspection Performed  Genitals / Buttocks / Groin Inspection Performed  Back Inspection Performed  RUE Inspected  LUE Inspection Performed  RLE Inspected  LLE Inspection Performed  Nails and Digits Inspection Performed                         Diagram Legend     Erythematous scaling macule/papule c/w actinic keratosis       Vascular papule c/w angioma      Pigmented verrucoid papule/plaque c/w seborrheic keratosis      Yellow umbilicated papule c/w sebaceous hyperplasia      Irregularly shaped tan macule c/w lentigo     1-2 mm smooth white papules consistent with Milia      Movable subcutaneous cyst with punctum c/w epidermal inclusion cyst      Subcutaneous movable cyst c/w pilar cyst      Firm pink to brown papule c/w dermatofibroma      Pedunculated fleshy papule(s) c/w skin tag(s)      Evenly pigmented macule c/w junctional nevus     Mildly variegated pigmented, slightly irregular-bordered macule c/w mildly atypical nevus      Flesh colored to evenly pigmented papule c/w intradermal nevus       Pink pearly papule/plaque c/w basal cell carcinoma      Erythematous hyperkeratotic cursted plaque c/w SCC      Surgical scar with no sign of skin cancer recurrence      Open and closed comedones      Inflammatory papules and pustules      Verrucoid papule  consistent consistent with wart     Erythematous eczematous patches and plaques     Dystrophic onycholytic nail with subungual debris c/w onychomycosis     Umbilicated papule    Erythematous-base heme-crusted tan verrucoid plaque consistent with inflamed seborrheic keratosis     Erythematous Silvery Scaling Plaque c/w Psoriasis     See annotation        Assessment / Plan:      Pathology Orders:       Normal Orders This Visit    Specimen to Pathology, Dermatology     Questions:    Procedure Type: Dermatology and skin neoplasms    Number of Specimens: 1    ------------------------: -------------------------    Spec 1 Procedure: Biopsy    Spec 1 Clinical Impression: Hyperpigmented patch with central SK and peripheral asymmetrical perifollicular pigmentation, r/o LM vs macular SK    Spec 1 Source: left cheek    Release to patient:           Neoplasm of uncertain behavior of skin  -     Specimen to Pathology, Dermatology  Shave biopsy procedure note:    Shave biopsy performed after verbal consent including risk of infection, scar, recurrence, need for additional treatment of site. Area prepped with alcohol, anesthetized with approximately 1.0cc of 1% lidocaine with epinephrine. Lesional tissue shaved with razor blade. Hemostasis achieved with application of aluminum chloride followed by hyfrecation. No complications. Dressing applied. Wound care explained.    Seborrheic keratoses  These are benign inherited growths without a malignant potential. Reassurance given to patient. No treatment is necessary.     History of nonmelanoma skin cancer  Scar  Area of previous NMSCs examined. Sites well healed with no signs of recurrence.    Total body skin examination performed today including at least 12 points as noted in physical examination. No lesions suspicious for malignancy noted.    Multiple benign nevi  Careful dermoscopy evaluation of nevi performed with none identified as needing biopsy today  Monitor for new mole or  moles that are becoming bigger, darker, irritated, or developing irregular borders.     Solar lentigo  This is a benign hyperpigmented sun induced lesion. Daily sun protection will reduce the number of new lesions. Treatment of these benign lesions are considered cosmetic.    Patient instructed in importance in daily broad spectrum sun protection of at least spf 30. Mineral sunscreen ingredients preferred (Zinc +/- Titanium) and can be found OTC.   Recommend Elta MD for daily use on face and neck.  Patient encouraged to wear hat for all outdoor exposure.   Also discussed sun avoidance and use of protective clothing.             Follow up in about 1 year (around 2/28/2024), or if symptoms worsen or fail to improve.

## 2023-03-06 LAB
FINAL PATHOLOGIC DIAGNOSIS: NORMAL
GROSS: NORMAL
Lab: NORMAL
MICROSCOPIC EXAM: NORMAL

## 2023-03-07 ENCOUNTER — TELEPHONE (OUTPATIENT)
Dept: DERMATOLOGY | Facility: CLINIC | Age: 68
End: 2023-03-07
Payer: COMMERCIAL

## 2023-03-07 NOTE — TELEPHONE ENCOUNTER
----- Message from Vivienne Noel MD sent at 3/6/2023  5:19 PM CST -----  This lesion is benign and no further intervention is warranted. Please call us if you have any question.  Thank you!   Dr Noel    Skin, left cheek , shave biopsy:   - SOLAR LENTIGO WITH MELANOCYTIC HYPERPLASIA OF SUN DAMAGED SKIN

## 2023-04-21 RX ORDER — ETODOLAC 500 MG/1
TABLET, FILM COATED ORAL
Qty: 60 TABLET | Refills: 0 | Status: SHIPPED | OUTPATIENT
Start: 2023-04-21 | End: 2023-07-17

## 2023-04-22 RX ORDER — ETODOLAC 500 MG/1
TABLET, FILM COATED ORAL
Qty: 60 TABLET | Refills: 0 | OUTPATIENT
Start: 2023-04-22

## 2023-04-25 ENCOUNTER — TELEPHONE (OUTPATIENT)
Dept: FAMILY MEDICINE | Facility: CLINIC | Age: 68
End: 2023-04-25
Payer: COMMERCIAL

## 2023-04-25 NOTE — TELEPHONE ENCOUNTER
----- Message from Franky Davis sent at 4/24/2023  4:20 PM CDT -----  Type: Needs Medical Advice  Who Called:  Pt's pharmacy [facility] mail order  Best Call Back Number: [phone] opt 2  Additional Information: Pt would like 90 tablets instead of 60, ref number [id number]

## 2023-04-25 NOTE — TELEPHONE ENCOUNTER
Returned call to [facility], no further action required.  Request was previously resolved.

## 2023-06-13 NOTE — TELEPHONE ENCOUNTER
No care due was identified.  [facility] Embedded Care Due Messages. Reference number: [id number].   6/13/2023 9:42:37 AM CDT

## 2023-06-14 RX ORDER — NORETHINDRONE ACETATE AND ETHINYL ESTRADIOL 1; 5 MG/1; UG/1
1 TABLET ORAL DAILY
Qty: 84 TABLET | Refills: 3 | Status: SHIPPED | OUTPATIENT
Start: 2023-06-14

## 2023-06-24 NOTE — TELEPHONE ENCOUNTER
Attempted to call patient to schedule CT in 6 months. LVM and my chart message sent.   24-Jun-2023 14:51

## 2023-07-17 ENCOUNTER — OFFICE VISIT (OUTPATIENT)
Dept: FAMILY MEDICINE | Facility: CLINIC | Age: 68
End: 2023-07-17
Payer: COMMERCIAL

## 2023-07-17 VITALS
SYSTOLIC BLOOD PRESSURE: 136 MMHG | RESPIRATION RATE: 18 BRPM | DIASTOLIC BLOOD PRESSURE: 84 MMHG | WEIGHT: 146.25 LBS | BODY MASS INDEX: 27.61 KG/M2 | HEIGHT: 61 IN | OXYGEN SATURATION: 97 % | HEART RATE: 81 BPM

## 2023-07-17 DIAGNOSIS — I10 HYPERTENSION, ESSENTIAL: ICD-10-CM

## 2023-07-17 DIAGNOSIS — R53.1 WEAKNESS: ICD-10-CM

## 2023-07-17 DIAGNOSIS — R53.83 FATIGUE, UNSPECIFIED TYPE: Primary | ICD-10-CM

## 2023-07-17 PROCEDURE — 85652 RBC SED RATE AUTOMATED: CPT | Performed by: FAMILY MEDICINE

## 2023-07-17 PROCEDURE — 36415 COLL VENOUS BLD VENIPUNCTURE: CPT | Mod: S$GLB,,, | Performed by: FAMILY MEDICINE

## 2023-07-17 PROCEDURE — 93010 ELECTROCARDIOGRAM REPORT: CPT | Mod: S$GLB,,, | Performed by: INTERNAL MEDICINE

## 2023-07-17 PROCEDURE — 3288F FALL RISK ASSESSMENT DOCD: CPT | Mod: CPTII,S$GLB,, | Performed by: FAMILY MEDICINE

## 2023-07-17 PROCEDURE — 1160F PR REVIEW ALL MEDS BY PRESCRIBER/CLIN PHARMACIST DOCUMENTED: ICD-10-PCS | Mod: CPTII,S$GLB,, | Performed by: FAMILY MEDICINE

## 2023-07-17 PROCEDURE — 1101F PR PT FALLS ASSESS DOC 0-1 FALLS W/OUT INJ PAST YR: ICD-10-PCS | Mod: CPTII,S$GLB,, | Performed by: FAMILY MEDICINE

## 2023-07-17 PROCEDURE — 3075F PR MOST RECENT SYSTOLIC BLOOD PRESS GE 130-139MM HG: ICD-10-PCS | Mod: CPTII,S$GLB,, | Performed by: FAMILY MEDICINE

## 2023-07-17 PROCEDURE — 93005 ELECTROCARDIOGRAM TRACING: CPT | Mod: S$GLB,,, | Performed by: FAMILY MEDICINE

## 2023-07-17 PROCEDURE — 1159F MED LIST DOCD IN RCRD: CPT | Mod: CPTII,S$GLB,, | Performed by: FAMILY MEDICINE

## 2023-07-17 PROCEDURE — 85025 COMPLETE CBC W/AUTO DIFF WBC: CPT | Performed by: FAMILY MEDICINE

## 2023-07-17 PROCEDURE — 3008F PR BODY MASS INDEX (BMI) DOCUMENTED: ICD-10-PCS | Mod: CPTII,S$GLB,, | Performed by: FAMILY MEDICINE

## 2023-07-17 PROCEDURE — 1125F PR PAIN SEVERITY QUANTIFIED, PAIN PRESENT: ICD-10-PCS | Mod: CPTII,S$GLB,, | Performed by: FAMILY MEDICINE

## 2023-07-17 PROCEDURE — 3288F PR FALLS RISK ASSESSMENT DOCUMENTED: ICD-10-PCS | Mod: CPTII,S$GLB,, | Performed by: FAMILY MEDICINE

## 2023-07-17 PROCEDURE — 36415 PR COLLECTION VENOUS BLOOD,VENIPUNCTURE: ICD-10-PCS | Mod: S$GLB,,, | Performed by: FAMILY MEDICINE

## 2023-07-17 PROCEDURE — 1125F AMNT PAIN NOTED PAIN PRSNT: CPT | Mod: CPTII,S$GLB,, | Performed by: FAMILY MEDICINE

## 2023-07-17 PROCEDURE — 3079F DIAST BP 80-89 MM HG: CPT | Mod: CPTII,S$GLB,, | Performed by: FAMILY MEDICINE

## 2023-07-17 PROCEDURE — 86140 C-REACTIVE PROTEIN: CPT | Performed by: FAMILY MEDICINE

## 2023-07-17 PROCEDURE — 1159F PR MEDICATION LIST DOCUMENTED IN MEDICAL RECORD: ICD-10-PCS | Mod: CPTII,S$GLB,, | Performed by: FAMILY MEDICINE

## 2023-07-17 PROCEDURE — 93005 EKG 12-LEAD: ICD-10-PCS | Mod: S$GLB,,, | Performed by: FAMILY MEDICINE

## 2023-07-17 PROCEDURE — 84443 ASSAY THYROID STIM HORMONE: CPT | Performed by: FAMILY MEDICINE

## 2023-07-17 PROCEDURE — 3079F PR MOST RECENT DIASTOLIC BLOOD PRESSURE 80-89 MM HG: ICD-10-PCS | Mod: CPTII,S$GLB,, | Performed by: FAMILY MEDICINE

## 2023-07-17 PROCEDURE — 1101F PT FALLS ASSESS-DOCD LE1/YR: CPT | Mod: CPTII,S$GLB,, | Performed by: FAMILY MEDICINE

## 2023-07-17 PROCEDURE — 99214 PR OFFICE/OUTPT VISIT, EST, LEVL IV, 30-39 MIN: ICD-10-PCS | Mod: S$GLB,,, | Performed by: FAMILY MEDICINE

## 2023-07-17 PROCEDURE — 3008F BODY MASS INDEX DOCD: CPT | Mod: CPTII,S$GLB,, | Performed by: FAMILY MEDICINE

## 2023-07-17 PROCEDURE — 99214 OFFICE O/P EST MOD 30 MIN: CPT | Mod: S$GLB,,, | Performed by: FAMILY MEDICINE

## 2023-07-17 PROCEDURE — 3075F SYST BP GE 130 - 139MM HG: CPT | Mod: CPTII,S$GLB,, | Performed by: FAMILY MEDICINE

## 2023-07-17 PROCEDURE — 80053 COMPREHEN METABOLIC PANEL: CPT | Performed by: FAMILY MEDICINE

## 2023-07-17 PROCEDURE — 1160F RVW MEDS BY RX/DR IN RCRD: CPT | Mod: CPTII,S$GLB,, | Performed by: FAMILY MEDICINE

## 2023-07-17 PROCEDURE — 93010 EKG 12-LEAD: ICD-10-PCS | Mod: S$GLB,,, | Performed by: INTERNAL MEDICINE

## 2023-07-18 LAB
ALBUMIN SERPL BCP-MCNC: 4.2 G/DL (ref 3.5–5.2)
ALP SERPL-CCNC: 65 U/L (ref 55–135)
ALT SERPL W/O P-5'-P-CCNC: 19 U/L (ref 10–44)
ANION GAP SERPL CALC-SCNC: 14 MMOL/L (ref 8–16)
AST SERPL-CCNC: 16 U/L (ref 10–40)
BASOPHILS # BLD AUTO: 0.05 K/UL (ref 0–0.2)
BASOPHILS NFR BLD: 0.6 % (ref 0–1.9)
BILIRUB SERPL-MCNC: 0.3 MG/DL (ref 0.1–1)
BUN SERPL-MCNC: 12 MG/DL (ref 8–23)
CALCIUM SERPL-MCNC: 9.4 MG/DL (ref 8.7–10.5)
CHLORIDE SERPL-SCNC: 104 MMOL/L (ref 95–110)
CO2 SERPL-SCNC: 25 MMOL/L (ref 23–29)
CREAT SERPL-MCNC: 0.7 MG/DL (ref 0.5–1.4)
CRP SERPL-MCNC: 3 MG/L (ref 0–8.2)
DIFFERENTIAL METHOD: NORMAL
EOSINOPHIL # BLD AUTO: 0.1 K/UL (ref 0–0.5)
EOSINOPHIL NFR BLD: 1.7 % (ref 0–8)
ERYTHROCYTE [DISTWIDTH] IN BLOOD BY AUTOMATED COUNT: 13 % (ref 11.5–14.5)
ERYTHROCYTE [SEDIMENTATION RATE] IN BLOOD BY PHOTOMETRIC METHOD: 14 MM/HR (ref 0–36)
EST. GFR  (NO RACE VARIABLE): >60 ML/MIN/1.73 M^2
GLUCOSE SERPL-MCNC: 87 MG/DL (ref 70–110)
HCT VFR BLD AUTO: 39.7 % (ref 37–48.5)
HGB BLD-MCNC: 13.3 G/DL (ref 12–16)
IMM GRANULOCYTES # BLD AUTO: 0.02 K/UL (ref 0–0.04)
IMM GRANULOCYTES NFR BLD AUTO: 0.2 % (ref 0–0.5)
LYMPHOCYTES # BLD AUTO: 2.4 K/UL (ref 1–4.8)
LYMPHOCYTES NFR BLD: 29.7 % (ref 18–48)
MCH RBC QN AUTO: 30.6 PG (ref 27–31)
MCHC RBC AUTO-ENTMCNC: 33.5 G/DL (ref 32–36)
MCV RBC AUTO: 92 FL (ref 82–98)
MONOCYTES # BLD AUTO: 0.9 K/UL (ref 0.3–1)
MONOCYTES NFR BLD: 10.6 % (ref 4–15)
NEUTROPHILS # BLD AUTO: 4.7 K/UL (ref 1.8–7.7)
NEUTROPHILS NFR BLD: 57.2 % (ref 38–73)
NRBC BLD-RTO: 0 /100 WBC
PLATELET # BLD AUTO: 281 K/UL (ref 150–450)
PMV BLD AUTO: 12.4 FL (ref 9.2–12.9)
POTASSIUM SERPL-SCNC: 3.4 MMOL/L (ref 3.5–5.1)
PROT SERPL-MCNC: 6.9 G/DL (ref 6–8.4)
RBC # BLD AUTO: 4.34 M/UL (ref 4–5.4)
SODIUM SERPL-SCNC: 143 MMOL/L (ref 136–145)
TSH SERPL DL<=0.005 MIU/L-ACNC: 0.82 UIU/ML (ref 0.4–4)
WBC # BLD AUTO: 8.18 K/UL (ref 3.9–12.7)

## 2023-07-23 ENCOUNTER — PATIENT MESSAGE (OUTPATIENT)
Dept: FAMILY MEDICINE | Facility: CLINIC | Age: 68
End: 2023-07-23
Payer: COMMERCIAL

## 2023-07-24 NOTE — PROGRESS NOTES
Subjective:       Patient ID: Meka Ann is a 67 y.o. female.    Chief Complaint: Weakness and Fatigue    HPI  The patient is a 59-year-old who is here today because she has not been feeling myself.  She is not been feeling herself for the past couple of months.  Someday she has better days than others.  She finds that she has been experiencing generalized weakness.  She does not find that her stamina is what it used to be.  She denies any chest pain, shortness a breath, or dyspnea on exertion.    Regarding her hypertension, her blood pressure is 136/84.  She is taking her Hyzaar and her Norvasc.    Regarding her acid reflux, she was having a flare in her acid reflux but now it is much better.  She estimates that she is now having acid reflux once every couple of weeks and generally uses over-the-counter medication with good results    Review of Systems is remarkable for weakness in her hip area.  She denies any weakness in her shoulders her arm area.    Review of Systems   Constitutional:  Positive for fatigue. Negative for appetite change, chills, diaphoresis, fever and unexpected weight change.   HENT:  Negative for congestion, ear pain, postnasal drip, rhinorrhea, sinus pressure, sneezing, sore throat and trouble swallowing.    Eyes:  Negative for pain, discharge and visual disturbance.   Respiratory:  Negative for cough, chest tightness, shortness of breath and wheezing.    Cardiovascular:  Negative for chest pain, palpitations and leg swelling.   Gastrointestinal:  Negative for abdominal distention, abdominal pain, blood in stool, constipation, diarrhea, nausea and vomiting.   Skin:  Negative for rash.   Neurological:  Positive for weakness.       Objective:      Physical Exam  Constitutional:       General: She is not in acute distress.     Appearance: Normal appearance. She is well-developed.   HENT:      Head: Normocephalic and atraumatic.      Right Ear: Hearing, tympanic membrane, ear canal and  "external ear normal.      Left Ear: Hearing, tympanic membrane, ear canal and external ear normal.      Nose: Nose normal.      Mouth/Throat:      Mouth: No oral lesions.      Pharynx: No oropharyngeal exudate or posterior oropharyngeal erythema.   Eyes:      General: Lids are normal. No scleral icterus.     Extraocular Movements: Extraocular movements intact.      Conjunctiva/sclera: Conjunctivae normal.      Pupils: Pupils are equal, round, and reactive to light.   Neck:      Thyroid: No thyroid mass or thyromegaly.      Vascular: No carotid bruit.   Cardiovascular:      Rate and Rhythm: Normal rate and regular rhythm. No extrasystoles are present.     Chest Wall: PMI is not displaced.      Heart sounds: Normal heart sounds. No murmur heard.    No gallop.   Pulmonary:      Effort: Pulmonary effort is normal. No accessory muscle usage or respiratory distress.      Breath sounds: Normal breath sounds.   Abdominal:      General: Bowel sounds are normal. There is no abdominal bruit.      Palpations: Abdomen is soft.      Tenderness: There is no abdominal tenderness. There is no rebound.   Musculoskeletal:      Cervical back: Normal range of motion and neck supple.   Lymphadenopathy:      Head:      Right side of head: No submental or submandibular adenopathy.      Left side of head: No submental or submandibular adenopathy.      Cervical:      Right cervical: No superficial, deep or posterior cervical adenopathy.     Left cervical: No superficial, deep or posterior cervical adenopathy.      Upper Body:      Right upper body: No supraclavicular adenopathy.      Left upper body: No supraclavicular adenopathy.   Skin:     General: Skin is warm and dry.   Neurological:      Mental Status: She is alert and oriented to person, place, and time.     Blood pressure 136/84, pulse 81, resp. rate 18, height 5' 1" (1.549 m), weight 66.3 kg (146 lb 4.4 oz), SpO2 97 %.Body mass index is 27.64 kg/m².              A/P:  1)  " generalized fatigue and weakness.  New.  We are going to check labs and an EKG.  I am also going to proceed with a stress echo.  If she develops any new or worsening symptoms as we proceed with our evaluation, she will let me know  2) possible weakness involving hip girdle muscles.  New.  We are going to check inflammatory markers to evaluate for PMR   3)  Hypertension.  Well controlled.  Continue with Hyzaar and Norvasc   4)  GERD.  Well controlled currently

## 2023-07-28 ENCOUNTER — CLINICAL SUPPORT (OUTPATIENT)
Dept: CARDIOLOGY | Facility: HOSPITAL | Age: 68
End: 2023-07-28
Attending: FAMILY MEDICINE
Payer: COMMERCIAL

## 2023-07-28 VITALS — BODY MASS INDEX: 27.56 KG/M2 | HEIGHT: 61 IN | WEIGHT: 146 LBS

## 2023-07-28 DIAGNOSIS — R53.83 FATIGUE, UNSPECIFIED TYPE: ICD-10-CM

## 2023-07-28 DIAGNOSIS — R53.1 WEAKNESS: ICD-10-CM

## 2023-07-28 LAB
ASCENDING AORTA: 2.43 CM
BSA FOR ECHO PROCEDURE: 1.69 M2
CV ECHO LV RWT: 0.34 CM
CV STRESS BASE HR: 80 BPM
DIASTOLIC BLOOD PRESSURE: 79 MMHG
DOP CALC LVOT AREA: 3.4 CM2
DOP CALC LVOT DIAMETER: 2.09 CM
DOP CALC LVOT PEAK VEL: 1.33 M/S
DOP CALC LVOT STROKE VOLUME: 90.52 CM3
DOP CALCLVOT PEAK VEL VTI: 26.4 CM
E WAVE DECELERATION TIME: 220.52 MSEC
E/A RATIO: 0.85
E/E' RATIO: 9.11 M/S
ECHO LV POSTERIOR WALL: 0.79 CM (ref 0.6–1.1)
EJECTION FRACTION: 65 %
FRACTIONAL SHORTENING: 31 % (ref 28–44)
INTERVENTRICULAR SEPTUM: 0.85 CM (ref 0.6–1.1)
LA MAJOR: 4.18 CM
LA MINOR: 3.81 CM
LA WIDTH: 3.6 CM
LEFT ATRIUM SIZE: 2.98 CM
LEFT ATRIUM VOLUME INDEX: 22 ML/M2
LEFT ATRIUM VOLUME: 36.35 CM3
LEFT INTERNAL DIMENSION IN SYSTOLE: 3.22 CM (ref 2.1–4)
LEFT VENTRICLE DIASTOLIC VOLUME INDEX: 60.16 ML/M2
LEFT VENTRICLE DIASTOLIC VOLUME: 99.26 ML
LEFT VENTRICLE MASS INDEX: 75 G/M2
LEFT VENTRICLE SYSTOLIC VOLUME INDEX: 25.1 ML/M2
LEFT VENTRICLE SYSTOLIC VOLUME: 41.48 ML
LEFT VENTRICULAR INTERNAL DIMENSION IN DIASTOLE: 4.64 CM (ref 3.5–6)
LEFT VENTRICULAR MASS: 123.55 G
LV LATERAL E/E' RATIO: 7.45 M/S
LV SEPTAL E/E' RATIO: 11.71 M/S
LVOT MG: 3.26 MMHG
LVOT MV: 0.84 CM/S
MV PEAK A VEL: 0.97 M/S
MV PEAK E VEL: 0.82 M/S
OHS CV CPX 1 MINUTE RECOVERY HEART RATE: 113 BPM
OHS CV CPX 85 PERCENT MAX PREDICTED HEART RATE MALE: 124
OHS CV CPX ESTIMATED METS: 11
OHS CV CPX MAX PREDICTED HEART RATE: 146
OHS CV CPX PATIENT IS FEMALE: 1
OHS CV CPX PATIENT IS MALE: 0
OHS CV CPX PEAK DIASTOLIC BLOOD PRESSURE: 107 MMHG
OHS CV CPX PEAK HEAR RATE: 148 BPM
OHS CV CPX PEAK RATE PRESSURE PRODUCT: NORMAL
OHS CV CPX PEAK SYSTOLIC BLOOD PRESSURE: 185 MMHG
OHS CV CPX PERCENT MAX PREDICTED HEART RATE ACHIEVED: 101
OHS CV CPX RATE PRESSURE PRODUCT PRESENTING: 9920
PULM VEIN S/D RATIO: 2.02
PV PEAK D VEL: 0.41 M/S
PV PEAK S VEL: 0.83 M/S
RA MAJOR: 4.04 CM
SINUS: 2.45 CM
STJ: 2.52 CM
STRESS ECHO POST EXERCISE DUR MIN: 6 MINUTES
STRESS ECHO POST EXERCISE DUR SEC: 58 SECONDS
SYSTOLIC BLOOD PRESSURE: 124 MMHG
TDI LATERAL: 0.11 M/S
TDI SEPTAL: 0.07 M/S
TDI: 0.09 M/S

## 2023-07-28 PROCEDURE — 93351 STRESS TTE COMPLETE: CPT | Mod: PO

## 2023-07-28 PROCEDURE — 93351 STRESS ECHO (CUPID ONLY): ICD-10-PCS | Mod: 26,,, | Performed by: INTERNAL MEDICINE

## 2023-07-28 PROCEDURE — 93351 STRESS TTE COMPLETE: CPT | Mod: 26,,, | Performed by: INTERNAL MEDICINE

## 2023-08-11 ENCOUNTER — PATIENT MESSAGE (OUTPATIENT)
Dept: FAMILY MEDICINE | Facility: CLINIC | Age: 68
End: 2023-08-11
Payer: COMMERCIAL

## 2023-09-08 ENCOUNTER — TELEPHONE (OUTPATIENT)
Dept: DERMATOLOGY | Facility: CLINIC | Age: 68
End: 2023-09-08
Payer: COMMERCIAL

## 2023-09-22 ENCOUNTER — OFFICE VISIT (OUTPATIENT)
Dept: FAMILY MEDICINE | Facility: CLINIC | Age: 68
End: 2023-09-22
Payer: COMMERCIAL

## 2023-09-22 VITALS
HEART RATE: 95 BPM | DIASTOLIC BLOOD PRESSURE: 76 MMHG | BODY MASS INDEX: 26.57 KG/M2 | TEMPERATURE: 98 F | SYSTOLIC BLOOD PRESSURE: 132 MMHG | HEIGHT: 61 IN | OXYGEN SATURATION: 98 % | RESPIRATION RATE: 16 BRPM | WEIGHT: 140.75 LBS

## 2023-09-22 DIAGNOSIS — M85.80 OSTEOPENIA, UNSPECIFIED LOCATION: ICD-10-CM

## 2023-09-22 DIAGNOSIS — E78.5 HYPERLIPIDEMIA, UNSPECIFIED HYPERLIPIDEMIA TYPE: ICD-10-CM

## 2023-09-22 DIAGNOSIS — I10 HYPERTENSION, ESSENTIAL: Primary | ICD-10-CM

## 2023-09-22 PROCEDURE — 3288F FALL RISK ASSESSMENT DOCD: CPT | Mod: CPTII,S$GLB,, | Performed by: FAMILY MEDICINE

## 2023-09-22 PROCEDURE — 90471 FLU VACCINE - QUADRIVALENT - ADJUVANTED: ICD-10-PCS | Mod: S$GLB,,, | Performed by: FAMILY MEDICINE

## 2023-09-22 PROCEDURE — 3075F SYST BP GE 130 - 139MM HG: CPT | Mod: CPTII,S$GLB,, | Performed by: FAMILY MEDICINE

## 2023-09-22 PROCEDURE — 90694 FLU VACCINE - QUADRIVALENT - ADJUVANTED: ICD-10-PCS | Mod: S$GLB,,, | Performed by: FAMILY MEDICINE

## 2023-09-22 PROCEDURE — 1160F RVW MEDS BY RX/DR IN RCRD: CPT | Mod: CPTII,S$GLB,, | Performed by: FAMILY MEDICINE

## 2023-09-22 PROCEDURE — 90694 VACC AIIV4 NO PRSRV 0.5ML IM: CPT | Mod: S$GLB,,, | Performed by: FAMILY MEDICINE

## 2023-09-22 PROCEDURE — 1101F PR PT FALLS ASSESS DOC 0-1 FALLS W/OUT INJ PAST YR: ICD-10-PCS | Mod: CPTII,S$GLB,, | Performed by: FAMILY MEDICINE

## 2023-09-22 PROCEDURE — 3288F PR FALLS RISK ASSESSMENT DOCUMENTED: ICD-10-PCS | Mod: CPTII,S$GLB,, | Performed by: FAMILY MEDICINE

## 2023-09-22 PROCEDURE — 1159F PR MEDICATION LIST DOCUMENTED IN MEDICAL RECORD: ICD-10-PCS | Mod: CPTII,S$GLB,, | Performed by: FAMILY MEDICINE

## 2023-09-22 PROCEDURE — 3075F PR MOST RECENT SYSTOLIC BLOOD PRESS GE 130-139MM HG: ICD-10-PCS | Mod: CPTII,S$GLB,, | Performed by: FAMILY MEDICINE

## 2023-09-22 PROCEDURE — 1159F MED LIST DOCD IN RCRD: CPT | Mod: CPTII,S$GLB,, | Performed by: FAMILY MEDICINE

## 2023-09-22 PROCEDURE — 1126F PR PAIN SEVERITY QUANTIFIED, NO PAIN PRESENT: ICD-10-PCS | Mod: CPTII,S$GLB,, | Performed by: FAMILY MEDICINE

## 2023-09-22 PROCEDURE — 99214 OFFICE O/P EST MOD 30 MIN: CPT | Mod: 25,S$GLB,, | Performed by: FAMILY MEDICINE

## 2023-09-22 PROCEDURE — 3008F PR BODY MASS INDEX (BMI) DOCUMENTED: ICD-10-PCS | Mod: CPTII,S$GLB,, | Performed by: FAMILY MEDICINE

## 2023-09-22 PROCEDURE — 1126F AMNT PAIN NOTED NONE PRSNT: CPT | Mod: CPTII,S$GLB,, | Performed by: FAMILY MEDICINE

## 2023-09-22 PROCEDURE — 3008F BODY MASS INDEX DOCD: CPT | Mod: CPTII,S$GLB,, | Performed by: FAMILY MEDICINE

## 2023-09-22 PROCEDURE — 1101F PT FALLS ASSESS-DOCD LE1/YR: CPT | Mod: CPTII,S$GLB,, | Performed by: FAMILY MEDICINE

## 2023-09-22 PROCEDURE — 3078F PR MOST RECENT DIASTOLIC BLOOD PRESSURE < 80 MM HG: ICD-10-PCS | Mod: CPTII,S$GLB,, | Performed by: FAMILY MEDICINE

## 2023-09-22 PROCEDURE — 99214 PR OFFICE/OUTPT VISIT, EST, LEVL IV, 30-39 MIN: ICD-10-PCS | Mod: 25,S$GLB,, | Performed by: FAMILY MEDICINE

## 2023-09-22 PROCEDURE — 3078F DIAST BP <80 MM HG: CPT | Mod: CPTII,S$GLB,, | Performed by: FAMILY MEDICINE

## 2023-09-22 PROCEDURE — 90471 IMMUNIZATION ADMIN: CPT | Mod: S$GLB,,, | Performed by: FAMILY MEDICINE

## 2023-09-22 PROCEDURE — 1160F PR REVIEW ALL MEDS BY PRESCRIBER/CLIN PHARMACIST DOCUMENTED: ICD-10-PCS | Mod: CPTII,S$GLB,, | Performed by: FAMILY MEDICINE

## 2023-09-22 RX ORDER — ROSUVASTATIN CALCIUM 10 MG/1
10 TABLET, COATED ORAL DAILY
Qty: 90 TABLET | Refills: 1 | Status: SHIPPED | OUTPATIENT
Start: 2023-09-22

## 2023-09-22 RX ORDER — LOSARTAN POTASSIUM AND HYDROCHLOROTHIAZIDE 25; 100 MG/1; MG/1
1 TABLET ORAL DAILY
Qty: 90 TABLET | Refills: 3 | Status: SHIPPED | OUTPATIENT
Start: 2023-09-22

## 2023-09-22 RX ORDER — AMLODIPINE BESYLATE 10 MG/1
10 TABLET ORAL DAILY
Qty: 90 TABLET | Refills: 3 | Status: SHIPPED | OUTPATIENT
Start: 2023-09-22 | End: 2024-09-21

## 2023-09-23 ENCOUNTER — PATIENT MESSAGE (OUTPATIENT)
Dept: FAMILY MEDICINE | Facility: CLINIC | Age: 68
End: 2023-09-23
Payer: COMMERCIAL

## 2023-09-23 DIAGNOSIS — M85.80 OSTEOPENIA, UNSPECIFIED LOCATION: Primary | ICD-10-CM

## 2023-09-23 NOTE — PROGRESS NOTES
Subjective:       Patient ID: Meka Ann is a 67 y.o. female.    Chief Complaint: Follow-up    HPI  The patient is a 67-year-old who is here today for short-term follow-up.  Since her last visit, she feels a lot better.  Her energy levels have improved.  She is back to her physical activities.  She is happy that all of her testing looked good.  We did review her labs and her stress echo which all looked good.      Aside from needing some of her medications refilled today, she has no acute questions or concerns regarding her health.  She would be willing to have her flu shot today.    Review of Systems   Constitutional:  Negative for appetite change, chills, diaphoresis, fatigue, fever and unexpected weight change.   HENT:  Negative for congestion, ear pain, postnasal drip, rhinorrhea, sinus pressure, sneezing, sore throat and trouble swallowing.    Eyes:  Negative for pain, discharge and visual disturbance.   Respiratory:  Negative for cough, chest tightness, shortness of breath and wheezing.    Cardiovascular:  Negative for chest pain, palpitations and leg swelling.   Gastrointestinal:  Negative for abdominal distention, abdominal pain, blood in stool, constipation, diarrhea, nausea and vomiting.   Skin:  Negative for rash.         Objective:      Physical Exam  Constitutional:       General: She is not in acute distress.     Appearance: Normal appearance. She is well-developed.   HENT:      Head: Normocephalic and atraumatic.      Right Ear: Hearing, tympanic membrane, ear canal and external ear normal.      Left Ear: Hearing, tympanic membrane, ear canal and external ear normal.      Nose: Nose normal.      Mouth/Throat:      Mouth: No oral lesions.      Pharynx: No oropharyngeal exudate or posterior oropharyngeal erythema.   Eyes:      General: Lids are normal. No scleral icterus.     Extraocular Movements: Extraocular movements intact.      Conjunctiva/sclera: Conjunctivae normal.      Pupils: Pupils are  "equal, round, and reactive to light.   Neck:      Thyroid: No thyroid mass or thyromegaly.      Vascular: No carotid bruit.   Cardiovascular:      Rate and Rhythm: Normal rate and regular rhythm. No extrasystoles are present.     Chest Wall: PMI is not displaced.      Heart sounds: Normal heart sounds. No murmur heard.     No gallop.   Pulmonary:      Effort: Pulmonary effort is normal. No accessory muscle usage or respiratory distress.      Breath sounds: Normal breath sounds.   Abdominal:      General: Bowel sounds are normal. There is no abdominal bruit.      Palpations: Abdomen is soft.      Tenderness: There is no abdominal tenderness. There is no rebound.   Musculoskeletal:      Cervical back: Normal range of motion and neck supple.   Lymphadenopathy:      Head:      Right side of head: No submental or submandibular adenopathy.      Left side of head: No submental or submandibular adenopathy.      Cervical:      Right cervical: No superficial, deep or posterior cervical adenopathy.     Left cervical: No superficial, deep or posterior cervical adenopathy.      Upper Body:      Right upper body: No supraclavicular adenopathy.      Left upper body: No supraclavicular adenopathy.   Skin:     General: Skin is warm and dry.   Neurological:      Mental Status: She is alert and oriented to person, place, and time.       Blood pressure 132/76, pulse 95, temperature 98.1 °F (36.7 °C), temperature source Temporal, resp. rate 16, height 5' 1" (1.549 m), weight 63.9 kg (140 lb 12.2 oz), SpO2 98 %.Body mass index is 26.6 kg/m².          A/P:  1) fatigue and weakness with exercise intolerance.  Resolved.  If this recurs, she will let me know  2) hypertension.  Well controlled.  Continue with Norvasc and Hyzaar   3)  Hyperlipidemia.  Previously well controlled.  Continue with Crestor.  We will check a fasting lipid profile later this year  4)  Osteopenia.  Status unknown.  We will check a DEXA scan soon  5) health maintenance " issues.  She will receive her flu shot today      As long as she does well, I will see her back in 9 months or sooner if needed

## 2023-09-24 ENCOUNTER — TELEPHONE (OUTPATIENT)
Dept: FAMILY MEDICINE | Facility: CLINIC | Age: 68
End: 2023-09-24
Payer: COMMERCIAL

## 2023-09-24 NOTE — TELEPHONE ENCOUNTER
DR. SAL'S ARTHROSCOPIC SHOULDER SURGERY DISCHARGE INSTRUCTIONS:    Changing your bandage...  Keep your dressing clean and dry.  On the third day after surgery you may remove the dressing.  Simply place a Band-Aid over each incision and change daily.  Once there is no drainage, the incisions may be left open to air.  Do not apply any ointments such as Neosporin.    Showering...  After the surgical dressing has been removed and there has been no drainage from the wound for 24 hours, you may shower.  Gently wipe the area with soap and water, rinse thoroughly and pat the area dry.    Wearing your sling...   You should wear your sling continuously until the nerve block has worn off.  If you had some type of repair such as a full rotator cuff repair, the sling needs to be worn until otherwise directed.   It may be removed for showering and dressing.   You should also remove the sling a few times a day to extend your elbow at your side.  If a CPM (continuous passive motion) was ordered for you, you may begin using it the day of surgery.  Start with 20 or 30 minutes up to 2 or 3 times a day.  Work up to 1-1.5 hours at least 3-4 times per day.     Pain medication...   Begin taking your pain medication as soon as the nerve block begins to wear off. On the day of surgery, take a dose of the pain medication before going to bed even if you haven't had any pain yet. It is important to control the pain early on after surgery.  Take the medication as prescribed and keep a log of the amount and times medication is taken.  Remember to drink extra fluids, eat fruits and fiber, etc. to avoid constipation    Please call our office at [phone] if you have further questions.    If you have any concerns following your Procedure/Surgery and cannot reach your physician's office, please contact [facility] at [phone] for assistance.        Care After Anesthesia or Sedation    After discharge  • Due to the  Done   medicine given, someone must drive you home. It is strongly recommended to have someone stay with you at home the day of discharge and the night after surgery.  • If you have infants or small children at home, please have someone help you for at least 24 hours after your surgery.  • Do not drive for at least 24 hours after surgery (or as told by your doctor).  • Rest for the remainder of the day. Go up and down stairs slowly.  • Do not smoke after surgery. Smoking can delay healing.  • Do not operate heavy or potential harmful equipment.  • Do not make legally binding decisions.  • Do not drink alcohol for 24 hours.    Diet   • Drink plenty of fluids (6 to 8 glasses of water a day).  • Resume your regular diet as able.  • Avoid greasy or spicy foods for 24 hours.   • Start eating a bland diet (toast, gelatin, 7-up, hot cereal, crackers, sherbet, broth soup).      Nausea   • Nausea may be expected for the first 24 to 48 hours.  • Drink small amounts of fluids such as water or sports drink  • Try sucking on hard candy      Urination  • The effects of anesthetics may cause some people to have trouble passing urine the day of surgery. Drink a lot of fluids to help prevent this.  • Try to urinate within 8 hours of surgery.  • If you are unable to pass urine and feel like you need to, call your doctor or the hospital.    Pain control  • Some incisions are injected with a long acting local anesthetic; it will wear off in 4 to 6 hours. You can expect to have some pain at this time.  • Treat your pain with the prescribed pain medicine before it wears off. Do not wait until your pain becomes severe.  • Ask your nurse when you had your last pain medicine, so you know when you can take another one after you get home.    Call your doctor if you have:  • Nausea and vomiting that does not stop  • Fever over 101° F  • Pain not relieved by pain medication  • If you are unable to pass your urine or you have not passed urine in the  last 8 hours.  • Unusual changes in behavior  • Dizziness  • Hives  If you are not able to reach your doctor, you may call the emergency department.    Wound Healing: What You Should Know    Do I have an infection?  Your body may show signs your wound is infected. If you see one or more of these signs, please call your health care provider:  • Redness and swelling - Increased redness and swelling around the wound (some redness is expected in the first 48 hours).  • Warmth - The area around the wound is warmer than the surrounding skin.  • Fever - Your temperature is above 101º F or stays above 100º F.  • Odor - A new or stronger, sweet or foul smell coming from the wound.  • Swelling - Swelling spreading to other areas.  • Drainage - Large amounts of drainage that involves blood, clear fluid or pus from the wound. Or, the drainage amount increases or changes color. (It is normal to have a small amount of drainage.)  • Pain - Increase in your pain or change in the location of the pain.  • Separation - Any place where the incision is  or opening.    How can I help prevent the spread of infection when I take care of my wound?  1. Use good handwashing techniques before and after contact with your wound. Here are the steps to follow:  Handwashing with soap and water:  • Wet hands with warm water and apply soap.  • Rub well over all surfaces for at least 15 seconds.  • Rinse well under water.  • Dry hands with clean towels.  • Turn off faucet with clean towels to prevent reinfecting hands.  Handwashing with a waterless alcohol-based product or gel:  • Apply approximately a nickel sized amount of product to palm of hand.  • Rub hands together, covering all surfaces including nails, until product evaporates, about 10 to 15 seconds.  • Use clean latex or vinyl gloves if cleaning or touching wound surface.  • Keep nails short and remove nail polish. Long nails or polish can harbor bacteria.  • Keep jewelry clean or  remove during wound care.  • Use hypoallergenic lotions with anti-bacterial agents on skin surrounding wound to maintain moisture and prevent irritation.    What else can I do to help my wound heal quickly and prevent other wounds?  • Stay active - Activity and exercise promote good circulation, which leads to wound healing.  • Avoid smoking - Smoking narrows your blood vessels, which decreases both the blood supply to your wound and the amount of oxygen in your blood. This will decrease your ability to heal and increase your chance of infection.  • Avoid alcohol - Alcohol decreases the ability of your body to fight infection.  • Avoid sitting with legs or ankles crossed - This can compress the blood vessels in your legs and decrease the blood supply to your wound.  • Eat a balanced diet - If you are unable to eat a balanced diet or required foods, you may benefit from a vitamin or mineral supplement.    Pain Management  Special Note: Be sure to follow any specific post-op instructions from your surgeon or nurse.     Once you’re home, you may have some pain, since even minor surgery causes swelling and breakdown of tissue. When it comes to effective pain management, the tips you learned in the hospital also work at home. To get the best pain relief possible, remember these points:    Use your medication only as directed  · If your pain is not relieved or if it gets worse, call your doctor.  · If pain lessens, try taking your medication less often.  Remember that medications need time to work  · Most pain relievers taken by mouth need at least 20-30 minutes to take effect.  · Take pain medication at regular times as directed. Don’t wait until the pain gets bad to take it.  Time your medication  · Try to time your medication so that you take it before beginning an activity, such as dressing or sitting at the table for dinner.  · Taking your medication at night may help you get a good night’s rest.  Eat lots of fruit  and vegetables  · Constipation is a common side effect with some pain medications. Eating fruit and vegetables can help.  · Drink lots of fluids.  Avoid drinking alcohol while taking pain medication  · Mixing alcohol and pain medication can cause dizziness and slow your respiratory system. It can even be fatal.  · Avoid driving or operating machinery while taking pain medication.  In addition, other ways to decrease pain  •    Relaxing techniques-slow, deep breathing, imagery, watching TV, listening to music   •    Heat or cold   •    Massage   •    Rest and changing your position in bed

## 2023-09-24 NOTE — TELEPHONE ENCOUNTER
----- Message from Corry Katz MD sent at 9/23/2023  6:22 PM CDT -----  Pls cancel the tsh in her upcoming labs  Sorry and thanks!

## 2023-09-29 ENCOUNTER — TELEPHONE (OUTPATIENT)
Dept: FAMILY MEDICINE | Facility: CLINIC | Age: 68
End: 2023-09-29
Payer: COMMERCIAL

## 2023-09-29 NOTE — TELEPHONE ENCOUNTER
----- Message from Kelly Ray sent at 9/27/2023 10:31 AM CDT -----  Contact: Self  Type: Sooner Appointment Request        Caller is requesting a sooner appointment. Caller declined first available appointment listed below. Caller will not accept being placed on the waitlist and is requesting a message be sent to doctor.        Name of Caller: Patient   Best Call Back Number: [phone]  Additional Information: Pt states she wants her CT put in states the bone density order is already in there. Plz call and advise pt her CT is in sos we can get both test on the same day. Thanks

## 2023-10-02 ENCOUNTER — TELEPHONE (OUTPATIENT)
Dept: FAMILY MEDICINE | Facility: CLINIC | Age: 68
End: 2023-10-02
Payer: COMMERCIAL

## 2023-10-02 DIAGNOSIS — R91.8 LUNG NODULES: Primary | ICD-10-CM

## 2023-10-02 DIAGNOSIS — R91.1 SOLITARY PULMONARY NODULE: ICD-10-CM

## 2023-10-02 NOTE — TELEPHONE ENCOUNTER
----- Message from Latonia Cleveland sent at 10/2/2023 11:04 AM CDT -----  Contact: Patient  Type: Needs Medical Advice    Who Called:  Patient  What is this regarding?:  Pt is needing the lung Ct orders put in along with her bone density orders.  Best Call Back Number:  [phone]  Additional Information:  Please call the patient back at the phone number listed above to advise. Thank you!

## 2023-10-19 ENCOUNTER — HOSPITAL ENCOUNTER (OUTPATIENT)
Dept: RADIOLOGY | Facility: HOSPITAL | Age: 68
Discharge: HOME | End: 2023-10-19
Attending: FAMILY MEDICINE
Payer: COMMERCIAL

## 2023-10-19 DIAGNOSIS — M85.80 OSTEOPENIA, UNSPECIFIED LOCATION: ICD-10-CM

## 2023-10-19 DIAGNOSIS — R91.1 SOLITARY PULMONARY NODULE: ICD-10-CM

## 2023-10-19 PROCEDURE — 71250 CT CHEST WITHOUT CONTRAST: ICD-10-PCS | Mod: 26,,, | Performed by: RADIOLOGY

## 2023-10-19 PROCEDURE — 71250 CT THORAX DX C-: CPT | Mod: TC,PO

## 2023-10-19 PROCEDURE — 71250 CT THORAX DX C-: CPT | Mod: 26,,, | Performed by: RADIOLOGY

## 2023-10-19 PROCEDURE — 77080 DXA BONE DENSITY AXIAL: CPT | Mod: 26,,, | Performed by: RADIOLOGY

## 2023-10-19 PROCEDURE — 77080 DXA BONE DENSITY AXIAL: CPT | Mod: TC,PO

## 2023-10-19 PROCEDURE — 77080 DXA BONE DENSITY AXIAL SKELETON 1 OR MORE SITES: ICD-10-PCS | Mod: 26,,, | Performed by: RADIOLOGY

## 2023-10-21 ENCOUNTER — PATIENT MESSAGE (OUTPATIENT)
Dept: FAMILY MEDICINE | Facility: CLINIC | Age: 68
End: 2023-10-21
Payer: COMMERCIAL

## 2023-12-15 ENCOUNTER — LAB VISIT (OUTPATIENT)
Dept: LAB | Facility: HOSPITAL | Age: 68
End: 2023-12-15
Attending: FAMILY MEDICINE
Payer: COMMERCIAL

## 2023-12-15 DIAGNOSIS — E78.5 HYPERLIPIDEMIA, UNSPECIFIED HYPERLIPIDEMIA TYPE: ICD-10-CM

## 2023-12-15 DIAGNOSIS — I10 HYPERTENSION, ESSENTIAL: ICD-10-CM

## 2023-12-15 LAB
CHOLEST SERPL-MCNC: 130 MG/DL (ref 120–199)
CHOLEST/HDLC SERPL: 2.5 {RATIO} (ref 2–5)
HDLC SERPL-MCNC: 51 MG/DL (ref 40–75)
HDLC SERPL: 39.2 % (ref 20–50)
LDLC SERPL CALC-MCNC: 62.2 MG/DL (ref 63–159)
NONHDLC SERPL-MCNC: 79 MG/DL
TRIGL SERPL-MCNC: 84 MG/DL (ref 30–150)

## 2023-12-15 PROCEDURE — 36415 COLL VENOUS BLD VENIPUNCTURE: CPT | Mod: PO | Performed by: FAMILY MEDICINE

## 2023-12-15 PROCEDURE — 80061 LIPID PANEL: CPT | Performed by: FAMILY MEDICINE

## 2023-12-16 ENCOUNTER — PATIENT MESSAGE (OUTPATIENT)
Dept: FAMILY MEDICINE | Facility: CLINIC | Age: 68
End: 2023-12-16
Payer: COMMERCIAL

## 2024-02-06 ENCOUNTER — OFFICE VISIT (OUTPATIENT)
Dept: DERMATOLOGY | Facility: CLINIC | Age: 69
End: 2024-02-06
Payer: COMMERCIAL

## 2024-02-06 VITALS — WEIGHT: 140.88 LBS | HEIGHT: 61 IN | BODY MASS INDEX: 26.6 KG/M2

## 2024-02-06 DIAGNOSIS — D22.9 MULTIPLE BENIGN NEVI: ICD-10-CM

## 2024-02-06 DIAGNOSIS — L57.8 OTHER SKIN CHANGES DUE TO CHRONIC EXPOSURE TO NONIONIZING RADIATION: ICD-10-CM

## 2024-02-06 DIAGNOSIS — Z08 ENCOUNTER FOR FOLLOW-UP SURVEILLANCE OF SKIN CANCER: ICD-10-CM

## 2024-02-06 DIAGNOSIS — L57.0 ACTINIC KERATOSES: Primary | ICD-10-CM

## 2024-02-06 DIAGNOSIS — L81.4 SOLAR LENTIGO: ICD-10-CM

## 2024-02-06 DIAGNOSIS — L82.1 SEBORRHEIC KERATOSES: ICD-10-CM

## 2024-02-06 DIAGNOSIS — Z85.828 ENCOUNTER FOR FOLLOW-UP SURVEILLANCE OF SKIN CANCER: ICD-10-CM

## 2024-02-06 DIAGNOSIS — D18.01 CHERRY ANGIOMA: ICD-10-CM

## 2024-02-06 PROCEDURE — 17003 DESTRUCT PREMALG LES 2-14: CPT | Mod: S$GLB,,, | Performed by: DERMATOLOGY

## 2024-02-06 PROCEDURE — 1126F AMNT PAIN NOTED NONE PRSNT: CPT | Mod: CPTII,S$GLB,, | Performed by: DERMATOLOGY

## 2024-02-06 PROCEDURE — 1159F MED LIST DOCD IN RCRD: CPT | Mod: CPTII,S$GLB,, | Performed by: DERMATOLOGY

## 2024-02-06 PROCEDURE — 17000 DESTRUCT PREMALG LESION: CPT | Mod: S$GLB,,, | Performed by: DERMATOLOGY

## 2024-02-06 PROCEDURE — 99213 OFFICE O/P EST LOW 20 MIN: CPT | Mod: 25,S$GLB,, | Performed by: DERMATOLOGY

## 2024-02-06 PROCEDURE — 1160F RVW MEDS BY RX/DR IN RCRD: CPT | Mod: CPTII,S$GLB,, | Performed by: DERMATOLOGY

## 2024-02-06 PROCEDURE — 3008F BODY MASS INDEX DOCD: CPT | Mod: CPTII,S$GLB,, | Performed by: DERMATOLOGY

## 2024-02-06 RX ORDER — IPRATROPIUM BROMIDE 42 UG/1
SPRAY, METERED NASAL
COMMUNITY
Start: 2023-10-27

## 2024-02-06 NOTE — PATIENT INSTRUCTIONS

## 2024-02-06 NOTE — PROGRESS NOTES
Subjective:      Patient ID:  Meka Ann is a 68 y.o. female who presents for   Chief Complaint   Patient presents with    Skin Check    Spot     LOV: 2023- AK, h/o NMSC, SK, lentigo, scar    Skin, left cheek , shave biopsy:  - SOLAR LENTIGO WITH MELANOCYTIC HYPERPLASIA OF SUN DAMAGED SKIN    Skin Check - TBSE    C/o spot on left hand for couple weeks, asymptomatic, no treatment  On HRT    Derm Hx:  SCC on R knee about 20 yrs ago  SCC - central chest E&S   Previously under the care of Dr Jacey Yañez  Intense sun exposure in youth     +Fhx of MM; mother, Temple,    Maternal cousin MM    Current Outpatient Medications:   ·  amLODIPine (NORVASC) 10 MG tablet, Take 1 tablet (10 mg total) by mouth once daily., Disp: 90 tablet, Rfl: 3  ·  calcium-vitamin D (OSCAL) 250 (625)-125 mg-unit per tablet, Take 1 tablet by mouth once daily., Disp: , Rfl:   ·  ipratropium (ATROVENT) 42 mcg (0.06 %) nasal spray, TAKE 2 SPRAYS BY NASAL ROUTE INTO EACH NOSTRIL FOUR TIMES DAILY AS NEEDED FOR 10 DAYS, Disp: , Rfl:   ·  losartan-hydrochlorothiazide 100-25 mg (HYZAAR) 100-25 mg per tablet, Take 1 tablet by mouth once daily., Disp: 90 tablet, Rfl: 3  ·  magnesium oxide-Mg AA chelate (MG-PLUS-PROTEIN) 133 mg Tab, Take 700 mg by mouth once daily., Disp: , Rfl:   ·  multivitamin capsule, Take 1 capsule by mouth once daily., Disp: , Rfl:   ·  norethindrone-ethinyl estradiol (FYAVOLV) 1-5 mg-mcg Tab, Take 1 tablet by mouth once daily., Disp: 84 tablet, Rfl: 3  ·  omeprazole (PRILOSEC) 40 MG capsule, TAKE 1 CAPSULE(40 MG) BY MOUTH BEFORE BREAKFAST, Disp: 90 capsule, Rfl: 3  ·  rosuvastatin (CRESTOR) 10 MG tablet, Take 1 tablet (10 mg total) by mouth once daily., Disp: 90 tablet, Rfl: 1  ·  triamcinolone acetonide 0.1% (KENALOG) 0.1 % cream, Apply topically 2 (two) times daily. X 10 - 14 days, Disp: 80 g, Rfl: 1  ·  valACYclovir (VALTREX) 500 MG tablet, Take 1 tablet (500 mg total) by mouth once daily., Disp: 90  tablet, Rfl: 3        Review of Systems   Constitutional:  Negative for fever, chills and fatigue.   Respiratory:  Negative for cough and shortness of breath.    Skin:  Positive for daily sunscreen use and activity-related sunscreen use. Negative for itching, rash and wears hat.   Hematologic/Lymphatic: Does not bruise/bleed easily.       Objective:   Physical Exam   Constitutional: She appears well-developed and well-nourished. No distress.   Neurological: She is alert and oriented to person, place, and time. She is not disoriented.   Psychiatric: She has a normal mood and affect.   Skin:   Areas Examined (abnormalities noted in diagram):   Scalp / Hair Palpated and Inspected  Head / Face Inspection Performed  Neck Inspection Performed  Chest / Axilla Inspection Performed  Abdomen Inspection Performed  Genitals / Buttocks / Groin Inspection Performed  Back Inspection Performed  RUE Inspected  LUE Inspection Performed  RLE Inspected  LLE Inspection Performed  Nails and Digits Inspection Performed                         Diagram Legend     Erythematous scaling macule/papule c/w actinic keratosis       Vascular papule c/w angioma      Pigmented verrucoid papule/plaque c/w seborrheic keratosis      Yellow umbilicated papule c/w sebaceous hyperplasia      Irregularly shaped tan macule c/w lentigo     1-2 mm smooth white papules consistent with Milia      Movable subcutaneous cyst with punctum c/w epidermal inclusion cyst      Subcutaneous movable cyst c/w pilar cyst      Firm pink to brown papule c/w dermatofibroma      Pedunculated fleshy papule(s) c/w skin tag(s)      Evenly pigmented macule c/w junctional nevus     Mildly variegated pigmented, slightly irregular-bordered macule c/w mildly atypical nevus      Flesh colored to evenly pigmented papule c/w intradermal nevus       Pink pearly papule/plaque c/w basal cell carcinoma      Erythematous hyperkeratotic cursted plaque c/w SCC      Surgical scar with no sign of  skin cancer recurrence      Open and closed comedones      Inflammatory papules and pustules      Verrucoid papule consistent consistent with wart     Erythematous eczematous patches and plaques     Dystrophic onycholytic nail with subungual debris c/w onychomycosis     Umbilicated papule    Erythematous-base heme-crusted tan verrucoid plaque consistent with inflamed seborrheic keratosis     Erythematous Silvery Scaling Plaque c/w Psoriasis     See annotation      Assessment / Plan:        Actinic keratoses  Cryosurgery Procedure Note    Verbal consent from the patient is obtained and the patient is aware of the precancerous quality and need for treatment of these lesions. Liquid nitrogen cryosurgery is applied to the 3 actinic keratoses, as detailed in the physical exam, to produce a freeze injury. The patient is aware that blisters may form and is instructed on wound care with gentle cleansing and use of vaseline ointment to keep moist until healed. The patient is supplied a handout on cryosurgery and is instructed to call if lesions do not completely resolve.    Encounter for follow-up surveillance of skin cancer  Area of previous NMSC examined. Site well healed with no signs of recurrence.    Total body skin examination performed today including at least 12 points as noted in physical examination. No lesions suspicious for malignancy noted.    Seborrheic keratoses  These are benign inherited growths without a malignant potential. Reassurance given to patient. No treatment is necessary.     Solar lentigo  This is a benign hyperpigmented sun induced lesion. Daily sun protection will reduce the number of new lesions. Treatment of these benign lesions are considered cosmetic.    Multiple benign nevi  Careful dermoscopy evaluation of nevi performed with none identified as needing biopsy today  Monitor for new mole or moles that are becoming bigger, darker, irritated, or developing irregular borders.     Cherry  angioma  This is a benign vascular lesion. Reassurance given. No treatment required.     Other skin changes due to chronic exposure to nonionizing radiation  Patient instructed in importance in daily broad spectrum sun protection of at least spf 30. Mineral sunscreen ingredients preferred (Zinc +/- Titanium) and can be found OTC.   Recommend Elta MD for daily use on face and neck.  Patient encouraged to wear hat for all outdoor exposure.   Also discussed sun avoidance and use of protective clothing.             Follow up in about 1 year (around 2/6/2025).

## 2024-02-08 ENCOUNTER — TELEPHONE (OUTPATIENT)
Dept: FAMILY MEDICINE | Facility: CLINIC | Age: 69
End: 2024-02-08
Payer: COMMERCIAL

## 2024-02-08 DIAGNOSIS — Z12.31 SCREENING MAMMOGRAM FOR HIGH-RISK PATIENT: Primary | ICD-10-CM

## 2024-03-06 RX ORDER — VALACYCLOVIR HYDROCHLORIDE 500 MG/1
500 TABLET, FILM COATED ORAL DAILY
Qty: 90 TABLET | Refills: 1 | Status: SHIPPED | OUTPATIENT
Start: 2024-03-06

## 2024-03-06 NOTE — TELEPHONE ENCOUNTER
Refill Decision Note   Meka Ann  is requesting a refill authorization.  Brief Assessment and Rationale for Refill:  Approve     Medication Therapy Plan:         Comments:     Note composed:10:12 AM 03/06/2024

## 2024-03-06 NOTE — TELEPHONE ENCOUNTER
----- Message from Dodie Pierre sent at 3/6/2024  9:46 AM CST -----  Regarding: refill  Contact: patient  Type:  RX Refill Request    Who Called:  patient  Refill or New Rx:  refill  RX Name and Strength:  valACYclovir (VALTREX) 500 MG tablet  rosuvastatin (CRESTOR) 10 MG tablet  How is the patient currently taking it? (ex. 1XDay):  as directed  Is this a 30 day or 90 day RX:  90  Preferred Pharm[facility] with phone number:    [facility] - [facility] AT [address] [facility]  [place] [address]  Phone: [phone] Fax: [phone]    Local or Mail Order:  mail order  Ordering Provider:  Dr Sterling Morgan Call Back Number:  [phone] (home)     Additional Information:  Please call patient to advise.  Thanks!

## 2024-03-06 NOTE — TELEPHONE ENCOUNTER
No care due was identified.  Health [facility] Embedded Care Due Messages. Reference number: [id number].   3/06/2024 10:04:40 AM CST

## 2024-08-09 ENCOUNTER — OFFICE VISIT (OUTPATIENT)
Dept: FAMILY MEDICINE | Facility: CLINIC | Age: 69
End: 2024-08-09
Payer: COMMERCIAL

## 2024-08-09 ENCOUNTER — HOSPITAL ENCOUNTER (OUTPATIENT)
Dept: RADIOLOGY | Facility: HOSPITAL | Age: 69
Discharge: HOME | End: 2024-08-09
Attending: FAMILY MEDICINE
Payer: COMMERCIAL

## 2024-08-09 VITALS
BODY MASS INDEX: 26.3 KG/M2 | DIASTOLIC BLOOD PRESSURE: 74 MMHG | WEIGHT: 139.31 LBS | OXYGEN SATURATION: 98 % | HEIGHT: 61 IN | HEART RATE: 85 BPM | SYSTOLIC BLOOD PRESSURE: 118 MMHG | RESPIRATION RATE: 16 BRPM | TEMPERATURE: 98 F

## 2024-08-09 DIAGNOSIS — I10 HYPERTENSION, ESSENTIAL: ICD-10-CM

## 2024-08-09 DIAGNOSIS — M54.2 NECK PAIN: ICD-10-CM

## 2024-08-09 DIAGNOSIS — E78.5 HYPERLIPIDEMIA, UNSPECIFIED HYPERLIPIDEMIA TYPE: ICD-10-CM

## 2024-08-09 DIAGNOSIS — G25.81 RLS (RESTLESS LEGS SYNDROME): ICD-10-CM

## 2024-08-09 DIAGNOSIS — Z00.00 ANNUAL PHYSICAL EXAM: Primary | ICD-10-CM

## 2024-08-09 LAB
ALBUMIN SERPL BCP-MCNC: 4.1 G/DL (ref 3.5–5.2)
ALP SERPL-CCNC: 67 U/L (ref 55–135)
ALT SERPL W/O P-5'-P-CCNC: 19 U/L (ref 10–44)
ANION GAP SERPL CALC-SCNC: 11 MMOL/L (ref 8–16)
AST SERPL-CCNC: 15 U/L (ref 10–40)
BASOPHILS # BLD AUTO: 0.06 K/UL (ref 0–0.2)
BASOPHILS NFR BLD: 0.9 % (ref 0–1.9)
BILIRUB SERPL-MCNC: 0.5 MG/DL (ref 0.1–1)
BUN SERPL-MCNC: 12 MG/DL (ref 8–23)
CALCIUM SERPL-MCNC: 9.9 MG/DL (ref 8.7–10.5)
CHLORIDE SERPL-SCNC: 107 MMOL/L (ref 95–110)
CHOLEST SERPL-MCNC: 131 MG/DL (ref 120–199)
CHOLEST/HDLC SERPL: 2.3 {RATIO} (ref 2–5)
CO2 SERPL-SCNC: 23 MMOL/L (ref 23–29)
CREAT SERPL-MCNC: 0.8 MG/DL (ref 0.5–1.4)
DIFFERENTIAL METHOD BLD: ABNORMAL
EOSINOPHIL # BLD AUTO: 0.1 K/UL (ref 0–0.5)
EOSINOPHIL NFR BLD: 1.9 % (ref 0–8)
ERYTHROCYTE [DISTWIDTH] IN BLOOD BY AUTOMATED COUNT: 14 % (ref 11.5–14.5)
EST. GFR  (NO RACE VARIABLE): >60 ML/MIN/1.73 M^2
ESTIMATED AVG GLUCOSE: 108 MG/DL (ref 68–131)
FERRITIN SERPL-MCNC: 66 NG/ML (ref 20–300)
GLUCOSE SERPL-MCNC: 103 MG/DL (ref 70–110)
HBA1C MFR BLD: 5.4 % (ref 4–5.6)
HCT VFR BLD AUTO: 41.3 % (ref 37–48.5)
HDLC SERPL-MCNC: 56 MG/DL (ref 40–75)
HDLC SERPL: 42.7 % (ref 20–50)
HGB BLD-MCNC: 13.7 G/DL (ref 12–16)
IMM GRANULOCYTES # BLD AUTO: 0.02 K/UL (ref 0–0.04)
IMM GRANULOCYTES NFR BLD AUTO: 0.3 % (ref 0–0.5)
LDLC SERPL CALC-MCNC: 62.4 MG/DL (ref 63–159)
LYMPHOCYTES # BLD AUTO: 2.4 K/UL (ref 1–4.8)
LYMPHOCYTES NFR BLD: 35.2 % (ref 18–48)
MCH RBC QN AUTO: 31.7 PG (ref 27–31)
MCHC RBC AUTO-ENTMCNC: 33.2 G/DL (ref 32–36)
MCV RBC AUTO: 96 FL (ref 82–98)
MONOCYTES # BLD AUTO: 0.8 K/UL (ref 0.3–1)
MONOCYTES NFR BLD: 11.7 % (ref 4–15)
NEUTROPHILS # BLD AUTO: 3.5 K/UL (ref 1.8–7.7)
NEUTROPHILS NFR BLD: 50 % (ref 38–73)
NONHDLC SERPL-MCNC: 75 MG/DL
NRBC BLD-RTO: 0 /100 WBC
PLATELET # BLD AUTO: 286 K/UL (ref 150–450)
PMV BLD AUTO: 12.2 FL (ref 9.2–12.9)
POTASSIUM SERPL-SCNC: 4.2 MMOL/L (ref 3.5–5.1)
PROT SERPL-MCNC: 7 G/DL (ref 6–8.4)
RBC # BLD AUTO: 4.32 M/UL (ref 4–5.4)
SODIUM SERPL-SCNC: 141 MMOL/L (ref 136–145)
TRIGL SERPL-MCNC: 63 MG/DL (ref 30–150)
TSH SERPL DL<=0.005 MIU/L-ACNC: 0.98 UIU/ML (ref 0.4–4)
WBC # BLD AUTO: 6.93 K/UL (ref 3.9–12.7)

## 2024-08-09 PROCEDURE — 84443 ASSAY THYROID STIM HORMONE: CPT | Performed by: FAMILY MEDICINE

## 2024-08-09 PROCEDURE — 3078F DIAST BP <80 MM HG: CPT | Mod: CPTII,,, | Performed by: FAMILY MEDICINE

## 2024-08-09 PROCEDURE — 83036 HEMOGLOBIN GLYCOSYLATED A1C: CPT | Performed by: FAMILY MEDICINE

## 2024-08-09 PROCEDURE — 85025 COMPLETE CBC W/AUTO DIFF WBC: CPT | Performed by: FAMILY MEDICINE

## 2024-08-09 PROCEDURE — 82728 ASSAY OF FERRITIN: CPT | Performed by: FAMILY MEDICINE

## 2024-08-09 PROCEDURE — 1159F MED LIST DOCD IN RCRD: CPT | Mod: CPTII,,, | Performed by: FAMILY MEDICINE

## 2024-08-09 PROCEDURE — 72040 X-RAY EXAM NECK SPINE 2-3 VW: CPT | Mod: 26,,, | Performed by: RADIOLOGY

## 2024-08-09 PROCEDURE — 99397 PER PM REEVAL EST PAT 65+ YR: CPT | Mod: ,,, | Performed by: FAMILY MEDICINE

## 2024-08-09 PROCEDURE — 1101F PT FALLS ASSESS-DOCD LE1/YR: CPT | Mod: CPTII,,, | Performed by: FAMILY MEDICINE

## 2024-08-09 PROCEDURE — 3044F HG A1C LEVEL LT 7.0%: CPT | Mod: CPTII,,, | Performed by: FAMILY MEDICINE

## 2024-08-09 PROCEDURE — 80053 COMPREHEN METABOLIC PANEL: CPT | Performed by: FAMILY MEDICINE

## 2024-08-09 PROCEDURE — 3008F BODY MASS INDEX DOCD: CPT | Mod: CPTII,,, | Performed by: FAMILY MEDICINE

## 2024-08-09 PROCEDURE — 1160F RVW MEDS BY RX/DR IN RCRD: CPT | Mod: CPTII,,, | Performed by: FAMILY MEDICINE

## 2024-08-09 PROCEDURE — 36415 COLL VENOUS BLD VENIPUNCTURE: CPT | Mod: ,,, | Performed by: FAMILY MEDICINE

## 2024-08-09 PROCEDURE — 80061 LIPID PANEL: CPT | Performed by: FAMILY MEDICINE

## 2024-08-09 PROCEDURE — 72040 X-RAY EXAM NECK SPINE 2-3 VW: CPT | Mod: TC,FY,PO

## 2024-08-09 PROCEDURE — 3074F SYST BP LT 130 MM HG: CPT | Mod: CPTII,,, | Performed by: FAMILY MEDICINE

## 2024-08-09 PROCEDURE — 1125F AMNT PAIN NOTED PAIN PRSNT: CPT | Mod: CPTII,,, | Performed by: FAMILY MEDICINE

## 2024-08-09 PROCEDURE — 3288F FALL RISK ASSESSMENT DOCD: CPT | Mod: CPTII,,, | Performed by: FAMILY MEDICINE

## 2024-08-09 RX ORDER — NORETHINDRONE ACETATE AND ETHINYL ESTRADIOL 1; 5 MG/1; UG/1
1 TABLET ORAL DAILY
Qty: 84 TABLET | Refills: 3 | Status: SHIPPED | OUTPATIENT
Start: 2024-08-09

## 2024-08-09 RX ORDER — LOSARTAN POTASSIUM AND HYDROCHLOROTHIAZIDE 25; 100 MG/1; MG/1
1 TABLET ORAL DAILY
Qty: 90 TABLET | Refills: 3 | Status: SHIPPED | OUTPATIENT
Start: 2024-08-09

## 2024-08-09 RX ORDER — ROPINIROLE 0.5 MG/1
0.5 TABLET, FILM COATED ORAL NIGHTLY
Qty: 30 TABLET | Refills: 1 | Status: SHIPPED | OUTPATIENT
Start: 2024-08-09 | End: 2025-08-09

## 2024-08-09 RX ORDER — AMLODIPINE BESYLATE 10 MG/1
10 TABLET ORAL DAILY
Qty: 90 TABLET | Refills: 3 | Status: SHIPPED | OUTPATIENT
Start: 2024-08-09 | End: 2025-08-09

## 2024-08-09 RX ORDER — OMEPRAZOLE 40 MG/1
CAPSULE, DELAYED RELEASE ORAL
Qty: 90 CAPSULE | Refills: 3 | Status: SHIPPED | OUTPATIENT
Start: 2024-08-09

## 2024-08-10 ENCOUNTER — PATIENT MESSAGE (OUTPATIENT)
Dept: FAMILY MEDICINE | Facility: CLINIC | Age: 69
End: 2024-08-10
Payer: COMMERCIAL

## 2024-08-10 DIAGNOSIS — R20.2 PARESTHESIAS: ICD-10-CM

## 2024-08-10 DIAGNOSIS — M54.2 NECK PAIN: Primary | ICD-10-CM

## 2024-08-10 DIAGNOSIS — M50.30 DDD (DEGENERATIVE DISC DISEASE), CERVICAL: ICD-10-CM

## 2024-08-11 NOTE — PROGRESS NOTES
Subjective:       Patient ID: Meka Ann is a 68 y.o. female.    Chief Complaint: Annual Exam    HPI  The patient is a 68-year-old who is here today for her annual exam.  Overall, she is doing well but at times feels as if she is falling apart as she is bothered by aches and pains.  She would be willing to do fasting labs today.  Her mammogram and colon cancer screening up-to-date.  She does stay physically active and consumes a healthy diet.  Of note, she does continue with her HRT.  She did try to quit this but her hot flashes were bad and so she resumed it at every other day    Today we discussed the followin) neck pain and right shoulder pain.  She has had chronic neck pain but her pain is getting worse.  Her neck pain involves the right side of her neck, the right top of her shoulder and her right upper arm.  She describes the pain as a nagging deep ache.  This pain does affect her sleep.  She has tried Aleve and Tylenol but again finds as if her symptoms are getting worse.  She has not noticed any weakness in her right arm  2) right thigh.  She has pain in her right thigh and points to the lateral thigh as location of this pain.  This is particularly problematic at night as she can no longer sleep on her right side because of this (and because of her neck).  She also has pain with certain yoga moves in this area  3) lower leg pain at night.  She  is having pain in her lower leg and she localizes to the shin area.  This happens at night when she is trying to watch TV or go to bed.  She frequently finds herself moving and stretching her legs to try to get some relief from this discomfort   4) hypertension.  Today her blood pressure is 118/74.  She is taking Norvasc and Hyzaar consistently   5)  Hyperlipidemia.  She is taking her Crestor consistently.      Review of Systems   Constitutional:  Negative for appetite change, chills, diaphoresis, fatigue, fever and unexpected weight change.   HENT:   Negative for congestion, dental problem, ear pain, hearing loss, postnasal drip, rhinorrhea, sneezing, sore throat and trouble swallowing.    Eyes:  Negative for photophobia, pain, discharge and visual disturbance.   Respiratory:  Negative for cough, chest tightness, shortness of breath and wheezing.    Cardiovascular:  Negative for chest pain, palpitations and leg swelling.   Gastrointestinal:  Negative for abdominal distention, abdominal pain, blood in stool, constipation, diarrhea, nausea and vomiting.   Endocrine: Negative for cold intolerance, heat intolerance, polydipsia and polyuria.   Genitourinary:  Negative for dysuria, flank pain, frequency, genital sores, hematuria, menstrual problem and vaginal discharge.   Musculoskeletal:  Positive for neck pain. Negative for arthralgias, joint swelling and myalgias.        Per HPI   Skin:  Negative for rash.   Neurological:  Negative for dizziness, syncope, light-headedness and headaches.   Hematological:  Negative for adenopathy. Does not bruise/bleed easily.   Psychiatric/Behavioral:  Negative for dysphoric mood, self-injury, sleep disturbance and suicidal ideas. The patient is not nervous/anxious.          Objective:      Physical Exam  Constitutional:       General: She is not in acute distress.     Appearance: Normal appearance. She is well-developed.   HENT:      Head: Normocephalic and atraumatic.      Right Ear: Hearing, tympanic membrane, ear canal and external ear normal.      Left Ear: Hearing, tympanic membrane, ear canal and external ear normal.      Nose: Nose normal.      Mouth/Throat:      Mouth: No oral lesions.      Pharynx: No oropharyngeal exudate or posterior oropharyngeal erythema.   Eyes:      General: Lids are normal. No scleral icterus.     Extraocular Movements: Extraocular movements intact.      Conjunctiva/sclera: Conjunctivae normal.      Pupils: Pupils are equal, round, and reactive to light.   Neck:      Thyroid: No thyroid mass or  "thyromegaly.      Vascular: No carotid bruit.   Cardiovascular:      Rate and Rhythm: Normal rate and regular rhythm. No extrasystoles are present.     Chest Wall: PMI is not displaced.      Heart sounds: Normal heart sounds. No murmur heard.     No gallop.   Pulmonary:      Effort: Pulmonary effort is normal. No accessory muscle usage or respiratory distress.      Breath sounds: Normal breath sounds.   Abdominal:      General: Bowel sounds are normal. There is no abdominal bruit.      Palpations: Abdomen is soft.      Tenderness: There is no abdominal tenderness. There is no rebound.   Musculoskeletal:      Cervical back: Normal range of motion and neck supple. Pain with movement and muscular tenderness present.   Lymphadenopathy:      Head:      Right side of head: No submental or submandibular adenopathy.      Left side of head: No submental or submandibular adenopathy.      Cervical:      Right cervical: No superficial, deep or posterior cervical adenopathy.     Left cervical: No superficial, deep or posterior cervical adenopathy.      Upper Body:      Right upper body: No supraclavicular adenopathy.      Left upper body: No supraclavicular adenopathy.   Skin:     General: Skin is warm and dry.   Neurological:      Mental Status: She is alert and oriented to person, place, and time.      Cranial Nerves: No cranial nerve deficit.      Sensory: No sensory deficit.      Motor: Weakness (RUE) present.   Psychiatric:         Speech: Speech normal.         Behavior: Behavior normal.         Thought Content: Thought content normal.       Blood pressure 118/74, pulse 85, temperature 97.9 °F (36.6 °C), temperature source Temporal, resp. rate 16, height 5' 1" (1.549 m), weight 63.2 kg (139 lb 5.3 oz), SpO2 98%.Body mass index is 26.33 kg/m².            A/P:  1) annual exam.  Health maintenance issues and anticipatory guidance issues were discussed.  She will continue to stay active and consume a healthy diet.  We will " check fasting labs today  2) right-sided neck pain with radicular symptoms.  Progressive.  We are going to check a C-spine x-ray.  We will likely move forward with an MRI after this back.     3) right lateral thigh pain likely due to trochanteric bursitis.  Persistent.  We did discuss a steroid injection with PMR but she declined.  She is also not interested in physical therapy.  If she changes her mind regarding these options or if she develops any new or worsening symptoms, she will let me know  4) lower leg pain at night possibly representing RLS.  New to me.  We will check a ferritin level.  We are also going to try Requip and we can adjust the dose if needed   5) hypertension.  Well controlled.  Continue with Norvasc and Hyzaar  6)  Hyperlipidemia.  Previously well controlled.  Continue with Crestor.  We will check fasting labs today

## 2024-08-15 DIAGNOSIS — E78.5 HYPERLIPIDEMIA, UNSPECIFIED HYPERLIPIDEMIA TYPE: Primary | ICD-10-CM

## 2024-08-15 RX ORDER — ROSUVASTATIN CALCIUM 10 MG/1
10 TABLET, COATED ORAL DAILY
Qty: 90 TABLET | Refills: 3 | Status: SHIPPED | OUTPATIENT
Start: 2024-08-15

## 2024-08-15 NOTE — TELEPHONE ENCOUNTER
No care due was identified.  Health [facility] Embedded Care Due Messages. Reference number: [id number].   8/15/2024 10:56:16 AM CDT

## 2024-08-15 NOTE — TELEPHONE ENCOUNTER
Refill Decision Note   Meka Ann  is requesting a refill authorization.  Brief Assessment and Rationale for Refill:  Approve     Medication Therapy Plan:         Comments:     Note composed:6:41 PM 08/15/2024

## 2024-08-26 ENCOUNTER — HOSPITAL ENCOUNTER (OUTPATIENT)
Dept: RADIOLOGY | Facility: HOSPITAL | Age: 69
Discharge: HOME | End: 2024-08-26
Attending: FAMILY MEDICINE
Payer: COMMERCIAL

## 2024-08-26 DIAGNOSIS — M50.30 DDD (DEGENERATIVE DISC DISEASE), CERVICAL: ICD-10-CM

## 2024-08-26 DIAGNOSIS — R20.2 PARESTHESIAS: ICD-10-CM

## 2024-08-26 DIAGNOSIS — M54.2 NECK PAIN: ICD-10-CM

## 2024-08-26 PROCEDURE — 72141 MRI NECK SPINE W/O DYE: CPT | Mod: 26,,, | Performed by: RADIOLOGY

## 2024-08-26 PROCEDURE — 72141 MRI NECK SPINE W/O DYE: CPT | Mod: TC,PO

## 2024-08-29 ENCOUNTER — PATIENT MESSAGE (OUTPATIENT)
Dept: FAMILY MEDICINE | Facility: CLINIC | Age: 69
End: 2024-08-29
Payer: COMMERCIAL

## 2024-08-29 DIAGNOSIS — M48.00 SPINAL STENOSIS, UNSPECIFIED SPINAL REGION: Primary | ICD-10-CM

## 2024-08-29 DIAGNOSIS — M51.9 FORAMINAL STENOSIS DUE TO INTERVERTEBRAL DISC DISEASE: ICD-10-CM

## 2024-08-29 DIAGNOSIS — M99.79 FORAMINAL STENOSIS DUE TO INTERVERTEBRAL DISC DISEASE: ICD-10-CM

## 2024-09-04 ENCOUNTER — OFFICE VISIT (OUTPATIENT)
Dept: PAIN MEDICINE | Facility: CLINIC | Age: 69
End: 2024-09-04
Payer: COMMERCIAL

## 2024-09-04 VITALS
BODY MASS INDEX: 27.18 KG/M2 | HEART RATE: 77 BPM | SYSTOLIC BLOOD PRESSURE: 163 MMHG | WEIGHT: 143.94 LBS | HEIGHT: 61 IN | DIASTOLIC BLOOD PRESSURE: 74 MMHG

## 2024-09-04 DIAGNOSIS — M54.2 CERVICALGIA: Primary | ICD-10-CM

## 2024-09-04 DIAGNOSIS — M51.9 FORAMINAL STENOSIS DUE TO INTERVERTEBRAL DISC DISEASE: ICD-10-CM

## 2024-09-04 DIAGNOSIS — M99.79 FORAMINAL STENOSIS DUE TO INTERVERTEBRAL DISC DISEASE: ICD-10-CM

## 2024-09-04 DIAGNOSIS — M48.00 SPINAL STENOSIS, UNSPECIFIED SPINAL REGION: ICD-10-CM

## 2024-09-04 DIAGNOSIS — M54.12 CERVICAL RADICULOPATHY: ICD-10-CM

## 2024-09-04 PROCEDURE — 3044F HG A1C LEVEL LT 7.0%: CPT | Mod: CPTII,S$GLB,, | Performed by: PHYSICIAN ASSISTANT

## 2024-09-04 PROCEDURE — 1159F MED LIST DOCD IN RCRD: CPT | Mod: CPTII,S$GLB,, | Performed by: PHYSICIAN ASSISTANT

## 2024-09-04 PROCEDURE — 99999 PR PBB SHADOW E&M-EST. PATIENT-LVL IV: CPT | Mod: PBBFAC,,, | Performed by: PHYSICIAN ASSISTANT

## 2024-09-04 PROCEDURE — 1125F AMNT PAIN NOTED PAIN PRSNT: CPT | Mod: CPTII,S$GLB,, | Performed by: PHYSICIAN ASSISTANT

## 2024-09-04 PROCEDURE — 3077F SYST BP >= 140 MM HG: CPT | Mod: CPTII,S$GLB,, | Performed by: PHYSICIAN ASSISTANT

## 2024-09-04 PROCEDURE — 3078F DIAST BP <80 MM HG: CPT | Mod: CPTII,S$GLB,, | Performed by: PHYSICIAN ASSISTANT

## 2024-09-04 PROCEDURE — 1160F RVW MEDS BY RX/DR IN RCRD: CPT | Mod: CPTII,S$GLB,, | Performed by: PHYSICIAN ASSISTANT

## 2024-09-04 PROCEDURE — 99204 OFFICE O/P NEW MOD 45 MIN: CPT | Mod: S$GLB,,, | Performed by: PHYSICIAN ASSISTANT

## 2024-09-04 PROCEDURE — 3008F BODY MASS INDEX DOCD: CPT | Mod: CPTII,S$GLB,, | Performed by: PHYSICIAN ASSISTANT

## 2024-09-04 PROCEDURE — 3288F FALL RISK ASSESSMENT DOCD: CPT | Mod: CPTII,S$GLB,, | Performed by: PHYSICIAN ASSISTANT

## 2024-09-04 PROCEDURE — 1101F PT FALLS ASSESS-DOCD LE1/YR: CPT | Mod: CPTII,S$GLB,, | Performed by: PHYSICIAN ASSISTANT

## 2024-09-04 RX ORDER — TIZANIDINE 4 MG/1
4 TABLET ORAL NIGHTLY PRN
Qty: 40 TABLET | Refills: 0 | Status: SHIPPED | OUTPATIENT
Start: 2024-09-04

## 2024-09-04 NOTE — PROGRESS NOTES
[facility] New Patient Evaluation      Referred by: Dr. Corry Katz    PCP: Corry Katz MD    CC:   Chief Complaint   Patient presents with    Establish Care    Back Pain    Neck Pain    Shoulder Pain          HPI:   Meka Ann is a 68 y.o. year old female patient who has a past medical history of Bronchiectasis, uncomplicated, Coronary artery calcification, DDD (degenerative disc disease), cervical, DDD (degenerative disc disease), lumbar, Emphysema of lung, Family history- stomach cancer, GERD (gastroesophageal reflux disease), H/O cardiovascular stress test, H/O esophagogastroduodenoscopy, History of esophagitis, History of gastritis, History of herpes simplex infection, History of SCC (squamous cell carcinoma) of skin, History of squamous cell carcinoma of skin, History of tobacco abuse, Hypertension, Hypertensive retinopathy, Osteopenia, Prediabetes, Pulmonary nodules, and S/P colonoscopy. She presents in referral from Dr. Corry Katz for neck pain.    Has been present for greater than 1 year and progressively increasing.  She describes pain in the posterior and right side of the neck radiating towards the right shoulder and right biceps region.  She occasionally has tingling further down into.  No significant left-sided symptoms.  Pain affects quality.  Pain increases with activity, lying the right side, RS more pronounced when she is tired.  She has tried Aleve and Tylenol at home.  No other treatments for the neck.  She has had some lumbar spine issues in the past with lumbar epidural steroid injection about 13 years ago.    Denies bowel/ bladder incontinence.    Past and current medications:  Antineuropathics:  NSAIDs: aleve  Antidepressants:  Muscle relaxers:  Opioids:  Antiplatelets/Anticoagulants:  Others:  tylenol    Physical Therapy/ Chiropractic care:  none    Pain Intervention History:  Lumbar ANN 13 years ago    Past Spine Surgical  History:  none        History:    Current Outpatient Medications:     amLODIPine (NORVASC) 10 MG tablet, Take 1 tablet (10 mg total) by mouth once daily., Disp: 90 tablet, Rfl: 3    calcium-vitamin D (OSCAL) 250 (625)-125 mg-unit per tablet, Take 1 tablet by mouth once daily., Disp: , Rfl:     losartan-hydrochlorothiazide 100-25 mg (HYZAAR) 100-25 mg per tablet, Take 1 tablet by mouth once daily., Disp: 90 tablet, Rfl: 3    magnesium oxide-Mg AA chelate (MG-PLUS-PROTEIN) 133 mg Tab, Take 700 mg by mouth once daily., Disp: , Rfl:     multivitamin capsule, Take 1 capsule by mouth once daily., Disp: , Rfl:     norethindrone-ethinyl estradiol (FYAVOLV) 1-5 mg-mcg Tab, Take 1 tablet by mouth once daily., Disp: 84 tablet, Rfl: 3    omeprazole (PRILOSEC) 40 MG capsule, TAKE 1 CAPSULE BY MOUTH BEFORE BREAKFAST, Disp: 90 capsule, Rfl: 3    rOPINIRole (REQUIP) 0.5 MG tablet, Take 1 tablet (0.5 mg total) by mouth every evening., Disp: 30 tablet, Rfl: 1    rosuvastatin (CRESTOR) 10 MG tablet, Take 1 tablet (10 mg total) by mouth once daily., Disp: 90 tablet, Rfl: 3    tiZANidine (ZANAFLEX) 4 MG tablet, Take 1 tablet (4 mg total) by mouth nightly as needed (muscle spasms/ pain)., Disp: 40 tablet, Rfl: 0    triamcinolone acetonide 0.1% (KENALOG) 0.1 % cream, Apply topically 2 (two) times daily. X 10 - 14 days, Disp: 80 g, Rfl: 1    valACYclovir (VALTREX) 500 MG tablet, Take 1 tablet (500 mg total) by mouth once daily., Disp: 90 tablet, Rfl: 1    Past Medical History:   Diagnosis Date    Bronchiectasis, uncomplicated     Coronary artery calcification     noted on CT 9/21    DDD (degenerative disc disease), cervical     spinal and foraminal stenosis noted 8/24    DDD (degenerative disc disease), lumbar     s/p PT and ANN; last MRI 6/08    Emphysema of lung     noted on CT    Family history- stomach cancer     next EGD due 10/20    GERD (gastroesophageal reflux disease)     H/O cardiovascular stress test     normal 8/23    H/O  esophagogastroduodenoscopy     10/17 next due 10/20    History of esophagitis     History of gastritis     EGD     History of herpes simplex infection     History of SCC (squamous cell carcinoma) of skin 2021    central chest    History of squamous cell carcinoma of skin     knee    History of tobacco abuse     Hypertension     Hypertensive retinopathy     Osteopenia     dexa , , 10/20; 10/23    Prediabetes     Pulmonary nodules     S/P colonoscopy        Past Surgical History:   Procedure Laterality Date    COLONOSCOPY      COLONOSCOPY N/A 2022    Procedure: COLONOSCOPY;  Surgeon: Monty Solis Jr., MD;  Location: [facility] ENDO;  Service: Endoscopy;  Laterality: N/A;    ESOPHAGOGASTRODUODENOSCOPY      ESOPHAGOGASTRODUODENOSCOPY N/A 2022    Procedure: EGD (ESOPHAGOGASTRODUODENOSCOPY);  Surgeon: Monty Solis Jr., MD;  Location: [facility] ENDO;  Service: Endoscopy;  Laterality: N/A;    TUBAL LIGATION         Family History   Problem Relation Name Age of Onset    Diabetes Mother      Cancer Mother          melanoma    Melanoma Mother      Cancer Father          stomach    Stroke Sister  58    Emphysema Maternal Grandfather      Cancer Maternal Aunt          breast cancer    Breast cancer Maternal Aunt          40s    Cancer Paternal Uncle          stomach    Eczema Neg Hx      Lupus Neg Hx      Psoriasis Neg Hx         Social History     Socioeconomic History    Marital status:    Tobacco Use    Smoking status: Former     Current packs/day: 0.00     Average packs/day: 2.0 packs/day for 35.0 years (70.0 ttl pk-yrs)     Types: Cigarettes     Start date: 1972     Quit date: 2007     Years since quittin.3    Smokeless tobacco: Never   Substance and Sexual Activity    Alcohol use: Yes     Alcohol/week: 6.0 standard drinks of alcohol     Types: 6 Glasses of wine per week     Comment: socially    Drug use: No    Sexual activity: Yes     Partners: Male     Social Determinants of  "Health     Financial Resource Strain: Low Risk  (1/31/2024)    Overall Financial Resource Strain (CARDIA)     Difficulty of Paying Living Expenses: Not hard at all   Food Insecurity: No Food Insecurity (1/31/2024)    Hunger Vital Sign     Worried About Running Out of Food in the Last Year: Never true     Ran Out of Food in the Last Year: Never true   Transportation Needs: No Transportation Needs (1/31/2024)    PRAPARE - Transportation     Lack of Transportation (Medical): No     Lack of Transportation (Non-Medical): No   Physical Activity: Sufficiently Active (1/31/2024)    Exercise Vital Sign     Days of Exercise per Week: 4 days     Minutes of Exercise per Session: 50 min   Stress: No Stress Concern Present (1/31/2024)    [employer] - Occupational Stress Questionnaire     Feeling of Stress : Not at all   Housing Stability: Low Risk  (1/31/2024)    Housing Stability Vital Sign     Unable to Pay for Housing in the Last Year: No     Number of Places Lived in the Last Year: 1     Unstable Housing in the Last Year: No       Review of patient's allergies indicates:   Allergen Reactions    Septra [sulfamethoxazole-trimethoprim] Hives       Labs:  Lab Results   Component Value Date    HGBA1C 5.4 08/09/2024       Lab Results   Component Value Date    WBC 6.93 08/09/2024    HGB 13.7 08/09/2024    HCT 41.3 08/09/2024    MCV 96 08/09/2024     08/09/2024           Review of Systems:  Neck apin.  Right shoulder pain. Right arm pain.  Balance of review of systems is negative.    Physical Exam:  Vitals:    09/04/24 0808   BP: (!) 163/74   Pulse: 77   Weight: 65.3 kg (143 lb 15.4 oz)   Height: 5' 1" (1.549 m)   PainSc:   2   PainLoc: Back     Body mass index is 27.2 kg/m².    Pain disability index:      9/4/2024     8:06 AM   Last 3 PDI Scores   Pain Disability Index (PDI) 28       Gen: NAD  Psych: mood appropriate for given condition  HEENT: eyes anicteric   CV: RRR, 2+ radial " pulse  Respiratory: non-labored, no signs of respiratory distress  Abd: non-distended  Skin: warm, dry and intact.  Gait: Able to heel walk, toe walk. No antalgic gait.     Coordination:   Tandem walking coordination: normal    Cervical spine: ROM is full in flexion, extension and lateral rotation without increased pain.  Spurling's maneuver causes no neck pain to either side.  Myofascial exam: No Tenderness to palpation across cervical paraspinous region bilaterally.    Lumbar spine:  Lumbar spine: ROM is full with flexion extension and oblique extension with no increased pain.    Cabrera's test causes no increased pain on either side.    Supine straight leg raise is negative bilaterally.    Internal and external rotation of the hip causes no increased pain on either side.  Myofascial exam: No tenderness to palpation across lumbar paraspinous muscles. No tenderness to palpation over the bilateral greater trochanters and bilateral SI joint    Sensory:  Intact and symmetrical to light touch in C4-T1 dermatomes bilaterally. Intact and symmetrical to light touch in L1-S1 dermatomes bilaterally.    Motor:    Right Left   C4 Shoulder Abduction  5  5   C5 Elbow Flexion    5  5   C6 Wrist Extension  5  5   C7 Elbow Extension   5  5   C8/T1 Hand Intrinsics   5  5        Right Left   L2/3 Iliacus Hip flexion  5  5   L3/4 Qudratus Femoris Knee Extension  5  5   L4/5 Tib Anterior Ankle Dorsiflexion   5  5   L5/S1 Extensor Hallicus Longus Great toe extension  5  5   S1/S2 Gastroc/Soleus Plantar Flexion  5  5      Right Left   Triceps DTR 2+ 2+   Biceps DTR 2+ 2+   Brachioradialis DTR 2+ 2+   Patellar DTR 2+ 2+   Achilles DTR 2+ 2+   Rojo Absent  Absent   Clonus Absent Absent   Babinski Absent Absent       Imaging:    Xray cervical spine 8-9-24:  There is extensive degenerative change. The vertebral bodies maintain normal height. There is no fracture. There is stable trace retrolisthesis of C3 on C4, C4 on C5 and  anterolisthesis of C7 on T1. There is severe disc space narrowing at the C3-4 through C6-7 levels. This has progressed at the C4-5 and C6-7 levels. There is also multilevel facet joint arthropathy. There is multilevel endplate sclerosis and osteophyte formation. The odontoid process is intact. The prevertebral soft tissues are normal. There is plaque at the carotid bulb bilaterally.     MRI cervical spine 8-9-24:  Morphology: Vertebral body heights are preserved.  No fractures or bony destructive changes.  Modic type 1 endplate centered marrow edema at C3-C4, C4-C5, and C5-C6 with endplate centered osteophytes.  Marrow signal is otherwise overall within normal limits.  Alignment: Straightening of the expected normal cervical lordosis and grade 1 retrolisthesis of C3 on C4, C5 on C6, and C6 on C7..  Cord: There is mild flattening of the ventral cord surface at C3-C4, C4-C5, and C5-C6.  No discrete cord edema..  Craniocervical Junction: Cerebellar tonsils are normally positioned. The visualized portions of the posterior fossa are unremarkable. The regional osseous anatomy is normal.    Disc levels:     C2-C3: Mild bilateral facet arthrosis.  Tiny central disc osteophyte complex.  The spinal canal and foramina are patent..     C3-C4: Severe degenerative disc height loss and broad-based disc osteophyte complex with ligamentum flavum thickening producing moderate narrowing of the spinal canal and flattening of the ventral cord surface.  Uncovertebral spurring and facet arthrosis produces moderate to severe bilateral foraminal narrowing..     C4-C5: Severe degenerative disc height loss and shallow broad-based disc osteophyte complex somewhat lateralizing to the right mildly flattening the ventral cord surface and contributing to mild to moderate narrowing of the spinal canal.  Facet arthrosis contributes to severe right and mild left foraminal narrowing..     C5-C6: Severe degenerative disc height loss and shallow  broad-based disc osteophyte complex with ligamentum flavum thickening producing moderate narrowing of the spinal canal and mild flattening of the ventral cord surface.  Uncovertebral spurring and facet arthrosis produces moderate to severe bilateral foraminal narrowing..     C6-C7: Severe degenerative disc height loss and broad-based shallow disc osteophyte complex with ligamentum flavum thickening producing mild to moderate narrowing of the spinal canal.  Uncovertebral spurring and facet arthrosis contribute to moderate to severe bilateral foraminal narrowing..     C7-T1: Within normal limits.     Soft tissues: The visualized paraspinal soft tissues are within normal limits.    Assessment:   Meka Ann is a 68 y.o. year old female patient who has a past medical history of Bronchiectasis, uncomplicated, Coronary artery calcification, DDD (degenerative disc disease), cervical, DDD (degenerative disc disease), lumbar, Emphysema of lung, Family history- stomach cancer, GERD (gastroesophageal reflux disease), H/O cardiovascular stress test, H/O esophagogastroduodenoscopy, History of esophagitis, History of gastritis, History of herpes simplex infection, History of SCC (squamous cell carcinoma) of skin, History of squamous cell carcinoma of skin, History of tobacco abuse, Hypertension, Hypertensive retinopathy, Osteopenia, Prediabetes, Pulmonary nodules, and S/P colonoscopy. She presents in referral from Dr. Corry Katz for right neck and shoulder, ar pain.    X-ray and MRI cervical spine personally viewed.  There multilevel degenerative changes.  Most significant findings are C3-4 retrolisthesis, disc osteophyte complex resulting in moderate central canal narrowing and bilateral severe foraminal narrowing.  C4-5 disc osteophyte complex and facet arthropathy results in mild-to-moderate central canal stenosis with severe right foraminal narrowing.  C5-6 disc osteophyte complex and facet hypertrophy results in  moderate central canal stenosis and moderate-to-severe bilateral foraminal narrowing.  While there is central canal stenosis at multiple levels, there is no cord edema.    Ms. Ackerman has chronic neck pain with right arm C4, C5, C6 radiculoapthy due to C3/4, C4/5, C5/6 degenerqative change with spinal canal and foraminal narrowing.  No myelopathic findings and no cord edema noted    Plan:  - To best help with pain, we discussed the role of medications, home exercise, physical therapy, epidural steroid injection and referral to Neurosurgery.  - she would like to try as conservative treatment as possible.  She will continue with Aleve as needed.  I have prescribed a muscle relaxant and take at bedtime.  - she will continue to exercise in the gym.  I have also given her handouts on neck stretches and exercises to perform.  She should do these at least 3-4 times per week.  - if no improvement, we can consider formal physical therapy versus C7-T1 epidural steroid injection.  Should she develop any weakness, significant increase in pain we can refer to neurosurgery.    Problem List Items Addressed This Visit    None  Visit Diagnoses       Cervicalgia    -  Primary    Relevant Medications    tiZANidine (ZANAFLEX) 4 MG tablet    Spinal stenosis, unspecified spinal region        Relevant Medications    tiZANidine (ZANAFLEX) 4 MG tablet    Foraminal stenosis due to intervertebral disc disease        Relevant Medications    tiZANidine (ZANAFLEX) 4 MG tablet    Cervical radiculopathy        Relevant Medications    tiZANidine (ZANAFLEX) 4 MG tablet            Follow Up: RTC if no improvement in the nest 4-6 weeks    : Reviewed and consistent with medication use as prescribed.    Thank you for referring this interesting patient, and I look forward to continuing to collaborate in her care.        Iesha Pierre PA-C  [facility]

## 2024-11-28 NOTE — TELEPHONE ENCOUNTER
No care due was identified.  [facility] Embedded Care Due Messages. Reference number: [id number].   11/28/2024 2:31:45 PM CST

## 2024-11-29 RX ORDER — VALACYCLOVIR HYDROCHLORIDE 500 MG/1
500 TABLET, FILM COATED ORAL
Qty: 90 TABLET | Refills: 2 | Status: SHIPPED | OUTPATIENT
Start: 2024-11-29

## 2024-11-29 NOTE — TELEPHONE ENCOUNTER
Refill Decision Note   Meka Ann  is requesting a refill authorization.  Brief Assessment and Rationale for Refill:  Approve     Medication Therapy Plan:         Comments:     Note composed:8:40 AM 11/29/2024

## 2025-04-22 ENCOUNTER — TELEPHONE (OUTPATIENT)
Dept: FAMILY MEDICINE | Facility: CLINIC | Age: 70
End: 2025-04-22
Payer: COMMERCIAL

## 2025-04-22 DIAGNOSIS — Z12.31 ENCOUNTER FOR SCREENING MAMMOGRAM FOR HIGH-RISK PATIENT: ICD-10-CM

## 2025-04-22 NOTE — TELEPHONE ENCOUNTER
----- Message from Leatha sent at 4/22/2025  1:52 PM CDT -----  Contact: pt  Type:  Needs Medical AdviceWho Called: ptWould the patient rather a call back or a response via [facility]?  Call [place] Call Back Number:  [phone] Information:  please place mammo orders in system Thanks

## 2025-06-17 ENCOUNTER — PATIENT MESSAGE (OUTPATIENT)
Dept: FAMILY MEDICINE | Facility: CLINIC | Age: 70
End: 2025-06-17
Payer: COMMERCIAL

## 2025-06-20 ENCOUNTER — OFFICE VISIT (OUTPATIENT)
Dept: DERMATOLOGY | Facility: CLINIC | Age: 70
End: 2025-06-20
Payer: COMMERCIAL

## 2025-06-20 VITALS — BODY MASS INDEX: 27.2 KG/M2 | WEIGHT: 143.94 LBS

## 2025-06-20 DIAGNOSIS — Z08 ENCOUNTER FOR FOLLOW-UP SURVEILLANCE OF SKIN CANCER: ICD-10-CM

## 2025-06-20 DIAGNOSIS — L81.4 SOLAR LENTIGO: ICD-10-CM

## 2025-06-20 DIAGNOSIS — D18.01 CHERRY ANGIOMA: ICD-10-CM

## 2025-06-20 DIAGNOSIS — D22.9 MULTIPLE BENIGN NEVI: ICD-10-CM

## 2025-06-20 DIAGNOSIS — Z85.828 ENCOUNTER FOR FOLLOW-UP SURVEILLANCE OF SKIN CANCER: ICD-10-CM

## 2025-06-20 DIAGNOSIS — L82.1 SEBORRHEIC KERATOSES: ICD-10-CM

## 2025-06-20 DIAGNOSIS — D48.5 NEOPLASM OF UNCERTAIN BEHAVIOR OF SKIN: Primary | ICD-10-CM

## 2025-06-20 DIAGNOSIS — L57.8 ACTINIC SKIN DAMAGE: ICD-10-CM

## 2025-06-20 PROCEDURE — 88305 TISSUE EXAM BY PATHOLOGIST: CPT | Mod: 26,,, | Performed by: DERMATOLOGY

## 2025-06-20 PROCEDURE — 88305 TISSUE EXAM BY PATHOLOGIST: CPT | Mod: TC | Performed by: DERMATOLOGY

## 2025-06-20 NOTE — PROGRESS NOTES
Subjective:      Patient ID:  Meka Ann is a 69 y.o. female who presents for   Chief Complaint   Patient presents with    Skin Check     TBSC     LOV 2024  ak, sk, lentigo    Patient is coming in for TBSC.  C/o new spot on left chest x days    Derm Hx:  SCC on R knee about 20 yrs ago  SCC - central chest E&S   left cheek , - SOLAR LENTIGO WITH MELANOCYTIC HYPERPLASIA OF SUN DAMAGED SKIN 2023      Previously under the care of Dr Jacey Yañez  Intense sun exposure in youth     +Fhx of MM; mother, temple,    Maternal cousin MM      Current Outpatient Medications:   ·  amLODIPine (NORVASC) 10 MG tablet, Take 1 tablet (10 mg total) by mouth once daily., Disp: 90 tablet, Rfl: 3  ·  calcium-vitamin D (OSCAL) 250 (625)-125 mg-unit per tablet, Take 1 tablet by mouth once daily., Disp: , Rfl:   ·  losartan-hydrochlorothiazide 100-25 mg (HYZAAR) 100-25 mg per tablet, Take 1 tablet by mouth once daily., Disp: 90 tablet, Rfl: 3  ·  magnesium oxide-Mg AA chelate (MG-PLUS-PROTEIN) 133 mg Tab, Take 700 mg by mouth once daily., Disp: , Rfl:   ·  multivitamin capsule, Take 1 capsule by mouth once daily., Disp: , Rfl:   ·  norethindrone-ethinyl estradiol (FYAVOLV) 1-5 mg-mcg Tab, Take 1 tablet by mouth once daily., Disp: 84 tablet, Rfl: 3  ·  omeprazole (PRILOSEC) 40 MG capsule, TAKE 1 CAPSULE BY MOUTH BEFORE BREAKFAST, Disp: 90 capsule, Rfl: 3  ·  rOPINIRole (REQUIP) 0.5 MG tablet, Take 1 tablet (0.5 mg total) by mouth every evening., Disp: 30 tablet, Rfl: 1  ·  rosuvastatin (CRESTOR) 10 MG tablet, TAKE 1 TABLET ONCE DAILY, Disp: 90 tablet, Rfl: 0  ·  tiZANidine (ZANAFLEX) 4 MG tablet, Take 1 tablet (4 mg total) by mouth nightly as needed (muscle spasms/ pain)., Disp: 40 tablet, Rfl: 0  ·  triamcinolone acetonide 0.1% (KENALOG) 0.1 % cream, Apply topically 2 (two) times daily. X 10 - 14 days, Disp: 80 g, Rfl: 1  ·  valACYclovir (VALTREX) 500 MG tablet, TAKE 1 TABLET ONCE DAILY, Disp: 90 tablet, Rfl: 2          Review of Systems   Constitutional:  Negative for fever, chills and fatigue.   Respiratory:  Negative for cough and shortness of breath.    Skin:  Positive for daily sunscreen use and activity-related sunscreen use. Negative for itching, rash and wears hat.   Hematologic/Lymphatic: Does not bruise/bleed easily.       Objective:   Physical Exam   Constitutional: She appears well-developed and well-nourished. No distress.   Neurological: She is alert and oriented to person, place, and time. She is not disoriented.   Psychiatric: She has a normal mood and affect.   Skin:   Areas Examined (abnormalities noted in diagram):   Scalp / Hair Palpated and Inspected  Head / Face Inspection Performed  Neck Inspection Performed  Chest / Axilla Inspection Performed  Abdomen Inspection Performed  Genitals / Buttocks / Groin Inspection Performed  Back Inspection Performed  RUE Inspected  LUE Inspection Performed  RLE Inspected  LLE Inspection Performed  Nails and Digits Inspection Performed                         Diagram Legend     Erythematous scaling macule/papule c/w actinic keratosis       Vascular papule c/w angioma      Pigmented verrucoid papule/plaque c/w seborrheic keratosis      Yellow umbilicated papule c/w sebaceous hyperplasia      Irregularly shaped tan macule c/w lentigo     1-2 mm smooth white papules consistent with Milia      Movable subcutaneous cyst with punctum c/w epidermal inclusion cyst      Subcutaneous movable cyst c/w pilar cyst      Firm pink to brown papule c/w dermatofibroma      Pedunculated fleshy papule(s) c/w skin tag(s)      Evenly pigmented macule c/w junctional nevus     Mildly variegated pigmented, slightly irregular-bordered macule c/w mildly atypical nevus      Flesh colored to evenly pigmented papule c/w intradermal nevus       Pink pearly papule/plaque c/w basal cell carcinoma      Erythematous hyperkeratotic cursted plaque c/w SCC      Surgical scar with no sign of skin cancer  recurrence      Open and closed comedones      Inflammatory papules and pustules      Verrucoid papule consistent consistent with wart     Erythematous eczematous patches and plaques     Dystrophic onycholytic nail with subungual debris c/w onychomycosis     Umbilicated papule    Erythematous-base heme-crusted tan verrucoid plaque consistent with inflamed seborrheic keratosis     Erythematous Silvery Scaling Plaque c/w Psoriasis     See annotation            Assessment / Plan:      Pathology Orders:       Normal Orders This Visit    Specimen to Pathology, Dermatology     Questions:    Procedure Type: Dermatology and skin neoplasms    Number of Specimens: 2    ------------------------: -------------------------    Spec 1 Procedure: Shave Biopsy    Spec 1 Clinical Impression: BCC vs other    Spec 1 Source: left chest    ------------------------: -------------------------    Spec 2 Procedure: Shave Biopsy    Spec 2 Clinical Impression: BCC vs other    Spec 2 Source: R posterior ankle    Clinical Information: see EPIC    Clinical History: see EPIC    Specimen Source: Skin    Release to patient: Immediate    Send normal result to authorizing provider's In Basket if patient is active on MyChart: Yes          Neoplasm of uncertain behavior of skin  -     Specimen to Pathology, Dermatology  Shave biopsy procedure note:x2    Shave biopsy performed after verbal consent including risk of infection, scar, recurrence, need for additional treatment of site. Area prepped with alcohol, anesthetized with approximately 1.0cc of 1% lidocaine with epinephrine. Lesional tissue shaved with razor blade. Hemostasis achieved with application of aluminum chloride followed by hyfrecation. No complications. Dressing applied. Wound care explained.    Seborrheic keratoses  These are benign inherited growths without a malignant potential. Reassurance given to patient. No treatment is necessary.     Solar lentigo  This is a benign hyperpigmented  sun induced lesion. Daily sun protection will reduce the number of new lesions. Treatment of these benign lesions are considered cosmetic.    Multiple benign nevi  Careful dermoscopy evaluation of nevi performed with none identified as needing biopsy today  Monitor for new mole or moles that are becoming bigger, darker, irritated, or developing irregular borders.     Cherry angioma  This is a benign vascular lesion. Reassurance given. No treatment required.     Encounter for follow-up surveillance of skin cancer  Area of previous NMSCs examined. Sites well healed with no signs of recurrence.    Total body skin examination performed today including at least 12 points as noted in physical examination. 2 lesions suspicious for malignancy noted.    Actinic skin damage  Patient instructed in importance in daily broad spectrum sun protection of at least spf 30. Mineral sunscreen ingredients preferred (Zinc +/- Titanium) and can be found OTC.   Patient encouraged to wear hat for all outdoor exposure.   Also discussed sun avoidance and use of protective clothing.             Follow up in about 1 year (around 6/20/2026).

## 2025-06-20 NOTE — PATIENT INSTRUCTIONS
Shave Biopsy Wound Care    Your doctor has performed a shave biopsy today.  A band aid and vaseline ointment has been placed over the site.  This should remain in place for 24 hours.  It is recommended that you keep the area dry for the first 24 hours.  After 24 hours, you may remove the band aid and wash the area with warm soap and water and apply Vaseline jelly.  Many patients prefer to use Neosporin or Bacitracin ointment.  This is acceptable; however, know that you can develop an allergy to this medication even if you have used it safely for years.  It is important to keep the area moist.  Letting it dry out and get air slows healing time, and will worsen the scar.  Band aid is optional after first 24 hours.      If you notice increasing redness, tenderness, pain, or yellow drainage at the biopsy site, please notify your doctor.  These are signs of an infection.    If your biopsy site is bleeding, apply firm pressure for 15 minutes straight.  Repeat for another 15 minutes, if it is still bleeding.   If the surgical site continues to bleed, then please contact your doctor.       [facility] - DERMATOLOGY  [address], [address]  [place] [address]  Dept: [phone]  Dept Fax: [phone]      CRYOSURGERY      Your doctor has used a method called cryosurgery to treat your skin condition. Cryosurgery refers to the use of very cold substances to treat a variety of skin conditions such as warts, pre-skin cancers, molluscum contagiosum, sun spots, and several benign growths. The substance we use in cryosurgery is liquid nitrogen and is so cold (-195 degrees Celsius) that is burns when administered.     Following treatment in the office, the skin may immediately burn and become red. You may find the area around the lesion is affected as well. It is sometimes necessary to treat not only the lesion, but a small area of the surrounding normal skin to achieve a good response.     A  blister, and even a blood filled blister, may form after treatment.   This is a normal response. If the blister is painful, it is acceptable to sterilize a needle and with rubbing alcohol and gently pop the blister. It is important that you gently wash the area with soap and warm water as the blister fluid may contain wart virus if a wart was treated. Do no remove the roof of the blister.     The area treated can take anywhere from 1-3 weeks to heal. Healing time depends on the kind skin lesion treated, the location, and how aggressively the lesion was treated. It is recommended that the areas treated are covered with Vaseline or bacitracin ointment and a band-aid. If a band-aid is not practical, just ointment applied several times per day will do. Keeping these areas moist will speed the healing time.    Treatment with liquid nitrogen can leave a scar. In dark skin, it may be a light or dark scar, in light skin it may be a white or pink scar. These will generally fade with time, but may never go away completely.     If you have any concerns after your treatment, please feel free to call the office.         [facility] - DERMATOLOGY  [address], [address]  [place] [address]  Dept: [phone]  Dept Fax: [phone]

## 2025-06-23 ENCOUNTER — RESULTS FOLLOW-UP (OUTPATIENT)
Dept: DERMATOLOGY | Facility: CLINIC | Age: 70
End: 2025-06-23

## 2025-06-24 ENCOUNTER — TELEPHONE (OUTPATIENT)
Dept: DERMATOLOGY | Facility: CLINIC | Age: 70
End: 2025-06-24
Payer: COMMERCIAL

## 2025-06-24 NOTE — TELEPHONE ENCOUNTER
Copied from CRM #[id number]. Topic: General Inquiry - Return Call  >> Jun 24, 2025 11:30 AM Armani wrote:  Type:  Patient Returning Call    Who Called:patient  Who Left Message for Patient:nurse  Does the patient know what this is regarding?:returning office call  Would the patient rather a call back or a response via [employer]?   Best Call Back Number:.[phone]

## 2025-08-07 ENCOUNTER — OFFICE VISIT (OUTPATIENT)
Dept: FAMILY MEDICINE | Facility: CLINIC | Age: 70
End: 2025-08-07
Payer: COMMERCIAL

## 2025-08-07 VITALS
WEIGHT: 134.81 LBS | DIASTOLIC BLOOD PRESSURE: 80 MMHG | HEIGHT: 61 IN | RESPIRATION RATE: 18 BRPM | OXYGEN SATURATION: 96 % | SYSTOLIC BLOOD PRESSURE: 118 MMHG | BODY MASS INDEX: 25.45 KG/M2 | TEMPERATURE: 98 F | HEART RATE: 78 BPM

## 2025-08-07 DIAGNOSIS — Z00.00 ANNUAL PHYSICAL EXAM: Primary | ICD-10-CM

## 2025-08-07 DIAGNOSIS — I10 HYPERTENSION, ESSENTIAL: ICD-10-CM

## 2025-08-07 DIAGNOSIS — E78.5 HYPERLIPIDEMIA, UNSPECIFIED HYPERLIPIDEMIA TYPE: ICD-10-CM

## 2025-08-07 LAB
ABSOLUTE EOSINOPHIL (OHS): 0.2 K/UL
ABSOLUTE MONOCYTE (OHS): 0.83 K/UL (ref 0.3–1)
ABSOLUTE NEUTROPHIL COUNT (OHS): 3.04 K/UL (ref 1.8–7.7)
ALBUMIN SERPL BCP-MCNC: 4.1 G/DL (ref 3.5–5.2)
ALP SERPL-CCNC: 62 UNIT/L (ref 40–150)
ALT SERPL W/O P-5'-P-CCNC: 21 UNIT/L (ref 0–55)
ANION GAP (OHS): 11 MMOL/L (ref 8–16)
AST SERPL-CCNC: 26 UNIT/L (ref 0–50)
BASOPHILS # BLD AUTO: 0.06 K/UL
BASOPHILS NFR BLD AUTO: 1 %
BILIRUB SERPL-MCNC: 0.4 MG/DL (ref 0.1–1)
BUN SERPL-MCNC: 13 MG/DL (ref 8–23)
CALCIUM SERPL-MCNC: 9 MG/DL (ref 8.7–10.5)
CHLORIDE SERPL-SCNC: 107 MMOL/L (ref 95–110)
CHOLEST SERPL-MCNC: 118 MG/DL (ref 120–199)
CHOLEST/HDLC SERPL: 2.7 {RATIO} (ref 2–5)
CO2 SERPL-SCNC: 21 MMOL/L (ref 23–29)
CREAT SERPL-MCNC: 0.8 MG/DL (ref 0.5–1.4)
ERYTHROCYTE [DISTWIDTH] IN BLOOD BY AUTOMATED COUNT: 14.2 % (ref 11.5–14.5)
GFR SERPLBLD CREATININE-BSD FMLA CKD-EPI: >60 ML/MIN/1.73/M2
GLUCOSE SERPL-MCNC: 85 MG/DL (ref 70–110)
HCT VFR BLD AUTO: 40.6 % (ref 37–48.5)
HDLC SERPL-MCNC: 43 MG/DL (ref 40–75)
HDLC SERPL: 36.4 % (ref 20–50)
HGB BLD-MCNC: 13.7 GM/DL (ref 12–16)
IMM GRANULOCYTES # BLD AUTO: 0.02 K/UL (ref 0–0.04)
IMM GRANULOCYTES NFR BLD AUTO: 0.3 % (ref 0–0.5)
LDLC SERPL CALC-MCNC: 60.4 MG/DL (ref 63–159)
LYMPHOCYTES # BLD AUTO: 2.03 K/UL (ref 1–4.8)
MCH RBC QN AUTO: 31 PG (ref 27–31)
MCHC RBC AUTO-ENTMCNC: 33.7 G/DL (ref 32–36)
MCV RBC AUTO: 92 FL (ref 82–98)
NONHDLC SERPL-MCNC: 75 MG/DL
NUCLEATED RBC (/100WBC) (OHS): 0 /100 WBC
PLATELET # BLD AUTO: 304 K/UL (ref 150–450)
PMV BLD AUTO: 12.1 FL (ref 9.2–12.9)
POTASSIUM SERPL-SCNC: 3.8 MMOL/L (ref 3.5–5.1)
PROT SERPL-MCNC: 7.1 GM/DL (ref 6–8.4)
RBC # BLD AUTO: 4.42 M/UL (ref 4–5.4)
RELATIVE EOSINOPHIL (OHS): 3.2 %
RELATIVE LYMPHOCYTE (OHS): 32.8 % (ref 18–48)
RELATIVE MONOCYTE (OHS): 13.4 % (ref 4–15)
RELATIVE NEUTROPHIL (OHS): 49.3 % (ref 38–73)
SODIUM SERPL-SCNC: 139 MMOL/L (ref 136–145)
TRIGL SERPL-MCNC: 73 MG/DL (ref 30–150)
TSH SERPL-ACNC: 0.97 UIU/ML (ref 0.4–4)
WBC # BLD AUTO: 6.18 K/UL (ref 3.9–12.7)

## 2025-08-07 PROCEDURE — 80053 COMPREHEN METABOLIC PANEL: CPT | Performed by: FAMILY MEDICINE

## 2025-08-07 PROCEDURE — 84443 ASSAY THYROID STIM HORMONE: CPT | Performed by: FAMILY MEDICINE

## 2025-08-07 PROCEDURE — 85025 COMPLETE CBC W/AUTO DIFF WBC: CPT | Performed by: FAMILY MEDICINE

## 2025-08-07 PROCEDURE — 80061 LIPID PANEL: CPT | Performed by: FAMILY MEDICINE

## 2025-08-07 RX ORDER — AMLODIPINE BESYLATE 10 MG/1
10 TABLET ORAL DAILY
Qty: 90 TABLET | Refills: 3 | Status: SHIPPED | OUTPATIENT
Start: 2025-08-07 | End: 2026-08-07

## 2025-08-07 RX ORDER — OMEPRAZOLE 40 MG/1
CAPSULE, DELAYED RELEASE ORAL
Qty: 90 CAPSULE | Refills: 3 | Status: SHIPPED | OUTPATIENT
Start: 2025-08-07

## 2025-08-07 RX ORDER — VALACYCLOVIR HYDROCHLORIDE 500 MG/1
500 TABLET, FILM COATED ORAL DAILY
Qty: 90 TABLET | Refills: 3 | Status: SHIPPED | OUTPATIENT
Start: 2025-08-07

## 2025-08-07 RX ORDER — LOSARTAN POTASSIUM AND HYDROCHLOROTHIAZIDE 25; 100 MG/1; MG/1
1 TABLET ORAL DAILY
Qty: 90 TABLET | Refills: 3 | Status: SHIPPED | OUTPATIENT
Start: 2025-08-07

## 2025-08-07 RX ORDER — NORETHINDRONE ACETATE AND ETHINYL ESTRADIOL 1; 5 MG/1; UG/1
1 TABLET ORAL DAILY
Qty: 84 TABLET | Refills: 3 | Status: SHIPPED | OUTPATIENT
Start: 2025-08-07

## 2025-08-07 NOTE — PROGRESS NOTES
Subjective:       Patient ID: Meka Ann is a 69 y.o. female.    Chief Complaint: Annual Exam    History of Present Illness    CHIEF COMPLAINT:  Patient presents today for her annual exam.  Overall, she is doing well.  She is exercising regularly at the gym.  She and her  are doing some traveling.  She has had her mammogram this year.  She would like to have her fasting labs done today.  Her colonoscopy is up-to-date.  Her EGD is due given her family history of gastric cancer.  She does continue with her HRT.  She occasionally tries to stop the HRT but still needs it due to persistent symptoms.  She does see her dermatologist regularly.  She would be willing to start the Shingrix series today    HYPERTENSION:  Today her blood pressure is 118/80.  She is taking Hyzaar and Norvasc consistently and tolerating these well    HYPERLIPIDEMIA:  She is due for fasting labs today.  She does continue with her Crestor which she tolerates well    GERD:  She does take her Prilosec consistently.  Again she does need her EGD.    HSV:  She does continue with her Valtrex consistently and finds this works well for her          Review of Systems   Constitutional:  Negative for appetite change, chills, diaphoresis, fatigue, fever and unexpected weight change.   HENT:  Negative for congestion, dental problem, ear pain, hearing loss, postnasal drip, rhinorrhea, sneezing, sore throat and trouble swallowing.    Eyes:  Negative for photophobia, pain, discharge and visual disturbance.   Respiratory:  Negative for cough, chest tightness, shortness of breath and wheezing.    Cardiovascular:  Negative for chest pain, palpitations and leg swelling.   Gastrointestinal:  Negative for abdominal distention, abdominal pain, blood in stool, constipation, diarrhea, nausea and vomiting.   Endocrine: Negative for cold intolerance, heat intolerance, polydipsia and polyuria.   Genitourinary:  Negative for dysuria, flank pain, frequency, genital  sores, hematuria, menstrual problem and vaginal discharge.   Musculoskeletal:  Negative for arthralgias, joint swelling and myalgias.   Skin:  Negative for rash.   Neurological:  Negative for dizziness, syncope, light-headedness and headaches.   Hematological:  Negative for adenopathy. Does not bruise/bleed easily.   Psychiatric/Behavioral:  Negative for dysphoric mood, self-injury, sleep disturbance and suicidal ideas. The patient is not nervous/anxious.          Objective:      Physical Exam  Constitutional:       General: She is not in acute distress.     Appearance: Normal appearance. She is well-developed.   HENT:      Head: Normocephalic and atraumatic.      Right Ear: Hearing, tympanic membrane, ear canal and external ear normal.      Left Ear: Hearing, tympanic membrane, ear canal and external ear normal.      Nose: Nose normal.      Mouth/Throat:      Mouth: No oral lesions.      Pharynx: No oropharyngeal exudate or posterior oropharyngeal erythema.   Eyes:      General: Lids are normal. No scleral icterus.     Extraocular Movements: Extraocular movements intact.      Conjunctiva/sclera: Conjunctivae normal.      Pupils: Pupils are equal, round, and reactive to light.   Neck:      Thyroid: No thyroid mass or thyromegaly.      Vascular: No carotid bruit.   Cardiovascular:      Rate and Rhythm: Normal rate and regular rhythm. No extrasystoles are present.     Chest Wall: PMI is not displaced.      Heart sounds: Normal heart sounds. No murmur heard.     No gallop.   Pulmonary:      Effort: Pulmonary effort is normal. No accessory muscle usage or respiratory distress.      Breath sounds: Normal breath sounds.   Abdominal:      General: Bowel sounds are normal. There is no abdominal bruit.      Palpations: Abdomen is soft.      Tenderness: There is no abdominal tenderness. There is no rebound.   Musculoskeletal:      Cervical back: Normal range of motion and neck supple.   Lymphadenopathy:      Head:      Right  "side of head: No submental or submandibular adenopathy.      Left side of head: No submental or submandibular adenopathy.      Cervical:      Right cervical: No superficial, deep or posterior cervical adenopathy.     Left cervical: No superficial, deep or posterior cervical adenopathy.      Upper Body:      Right upper body: No supraclavicular adenopathy.      Left upper body: No supraclavicular adenopathy.   Skin:     General: Skin is warm and dry.   Neurological:      Mental Status: She is alert and oriented to person, place, and time.      Cranial Nerves: No cranial nerve deficit.      Sensory: No sensory deficit.   Psychiatric:         Speech: Speech normal.         Behavior: Behavior normal.         Thought Content: Thought content normal.       Blood pressure 118/80, pulse 78, temperature 98.4 °F (36.9 °C), temperature source Temporal, resp. rate 18, height 5' 1" (1.549 m), weight 61.1 kg (134 lb 13 oz), SpO2 96%.Body mass index is 25.47 kg/m².            Assessment & Plan    1. Reviewed overall health status which is excellent  2. Assessed skin health, considering history of skin cancer and family history of melanoma.  3. Evaluated current medication regimen.      ESSENTIAL HYPERTENSION:  - Continue Amlodipine and Hyzaar for blood pressure management.  - Patient's blood pressure is excellent with normal cardiac exam.  - Medications refilled.    HYPERLIPIDEMIA:  - previously well controlled  - we will check labs today  - continue with Crestor    GERD:  - well controlled with Prilosec which we will continue    HSV:  - well controlled with Valtrex which we will continue    FAMILY HISTORY OF GASTRIC CANCER:  - she will contact the GI team to schedule her EGD    HISTORY OF SKIN CANCER AND FAMILY HISTORY OF MELANOMA:  - follow up with dermatology regularly    ANNUAL EXAM:  - health maintenance issues and anticipatory guidance issues were discussed  - we will check fasting labs today  - she will continue stay " physically active  - we will start the Shingrix series today    FOLLOW-UP:  - Follow up with gastroenterology for upper endoscopy (EGD) soon  - Schedule mammogram in 1 year and DEXA scan next year.   - follow-up with me in 1 year sooner if needed        This note was generated with the assistance of ambient listening technology. Verbal consent was obtained by the patient and accompanying visitor(s) for the recording of patient appointment to facilitate this note. I attest to having reviewed and edited the generated note for accuracy, though some syntax or spelling errors may persist. Please contact the author of this note for any clarification.

## 2025-08-09 ENCOUNTER — PATIENT MESSAGE (OUTPATIENT)
Dept: FAMILY MEDICINE | Facility: CLINIC | Age: 70
End: 2025-08-09
Payer: COMMERCIAL

## 2025-08-13 DIAGNOSIS — E78.5 HYPERLIPIDEMIA, UNSPECIFIED HYPERLIPIDEMIA TYPE: ICD-10-CM

## 2025-08-13 RX ORDER — ROSUVASTATIN CALCIUM 10 MG/1
10 TABLET, COATED ORAL
Qty: 90 TABLET | Refills: 3 | Status: SHIPPED | OUTPATIENT
Start: 2025-08-13

## 2025-08-14 ENCOUNTER — TELEPHONE (OUTPATIENT)
Dept: GASTROENTEROLOGY | Facility: CLINIC | Age: 70
End: 2025-08-14
Payer: COMMERCIAL